# Patient Record
Sex: FEMALE | Race: WHITE | NOT HISPANIC OR LATINO | URBAN - METROPOLITAN AREA
[De-identification: names, ages, dates, MRNs, and addresses within clinical notes are randomized per-mention and may not be internally consistent; named-entity substitution may affect disease eponyms.]

---

## 2017-05-27 ENCOUNTER — INPATIENT (INPATIENT)
Facility: HOSPITAL | Age: 77
LOS: 5 days | Discharge: HOME | End: 2017-06-02

## 2017-05-27 DIAGNOSIS — K55.1 CHRONIC VASCULAR DISORDERS OF INTESTINE: ICD-10-CM

## 2017-05-27 DIAGNOSIS — R55 SYNCOPE AND COLLAPSE: ICD-10-CM

## 2017-06-06 ENCOUNTER — EMERGENCY (EMERGENCY)
Facility: HOSPITAL | Age: 77
LOS: 0 days | Discharge: HOME | End: 2017-06-06
Admitting: INTERNAL MEDICINE

## 2017-06-06 DIAGNOSIS — R55 SYNCOPE AND COLLAPSE: ICD-10-CM

## 2017-06-06 DIAGNOSIS — K55.1 CHRONIC VASCULAR DISORDERS OF INTESTINE: ICD-10-CM
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
## 2017-06-28 DIAGNOSIS — J44.9 CHRONIC OBSTRUCTIVE PULMONARY DISEASE, UNSPECIFIED: ICD-10-CM

## 2017-06-28 DIAGNOSIS — S09.22XA TRAUMATIC RUPTURE OF LEFT EAR DRUM, INITIAL ENCOUNTER: ICD-10-CM

## 2017-06-28 DIAGNOSIS — H92.02 OTALGIA, LEFT EAR: ICD-10-CM

## 2017-06-28 DIAGNOSIS — Z99.81 DEPENDENCE ON SUPPLEMENTAL OXYGEN: ICD-10-CM

## 2017-06-28 DIAGNOSIS — Z90.49 ACQUIRED ABSENCE OF OTHER SPECIFIED PARTS OF DIGESTIVE TRACT: ICD-10-CM

## 2017-06-28 DIAGNOSIS — Z95.1 PRESENCE OF AORTOCORONARY BYPASS GRAFT: ICD-10-CM

## 2017-06-28 DIAGNOSIS — Z90.710 ACQUIRED ABSENCE OF BOTH CERVIX AND UTERUS: ICD-10-CM

## 2017-06-28 DIAGNOSIS — Z79.82 LONG TERM (CURRENT) USE OF ASPIRIN: ICD-10-CM
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
## 2017-06-28 DIAGNOSIS — Z79.899 OTHER LONG TERM (CURRENT) DRUG THERAPY: ICD-10-CM
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
## 2017-07-11 DIAGNOSIS — K21.9 GASTRO-ESOPHAGEAL REFLUX DISEASE WITHOUT ESOPHAGITIS: ICD-10-CM

## 2017-07-11 DIAGNOSIS — E87.5 HYPERKALEMIA: ICD-10-CM

## 2017-07-11 DIAGNOSIS — E78.5 HYPERLIPIDEMIA, UNSPECIFIED: ICD-10-CM

## 2017-07-11 DIAGNOSIS — I25.2 OLD MYOCARDIAL INFARCTION: ICD-10-CM

## 2017-07-11 DIAGNOSIS — Z95.1 PRESENCE OF AORTOCORONARY BYPASS GRAFT: ICD-10-CM

## 2017-07-11 DIAGNOSIS — Z95.5 PRESENCE OF CORONARY ANGIOPLASTY IMPLANT AND GRAFT: ICD-10-CM

## 2017-07-11 DIAGNOSIS — K59.00 CONSTIPATION, UNSPECIFIED: ICD-10-CM

## 2017-07-11 DIAGNOSIS — I34.0 NONRHEUMATIC MITRAL (VALVE) INSUFFICIENCY: ICD-10-CM

## 2017-07-11 DIAGNOSIS — I36.1 NONRHEUMATIC TRICUSPID (VALVE) INSUFFICIENCY: ICD-10-CM

## 2017-07-11 DIAGNOSIS — R06.02 SHORTNESS OF BREATH: ICD-10-CM

## 2017-07-11 DIAGNOSIS — R10.9 UNSPECIFIED ABDOMINAL PAIN: ICD-10-CM

## 2017-07-11 DIAGNOSIS — J96.21 ACUTE AND CHRONIC RESPIRATORY FAILURE WITH HYPOXIA: ICD-10-CM

## 2017-07-11 DIAGNOSIS — R07.9 CHEST PAIN, UNSPECIFIED: ICD-10-CM

## 2017-07-11 DIAGNOSIS — I25.10 ATHEROSCLEROTIC HEART DISEASE OF NATIVE CORONARY ARTERY WITHOUT ANGINA PECTORIS: ICD-10-CM

## 2017-07-11 DIAGNOSIS — E05.90 THYROTOXICOSIS, UNSPECIFIED WITHOUT THYROTOXIC CRISIS OR STORM: ICD-10-CM

## 2017-07-11 DIAGNOSIS — J18.9 PNEUMONIA, UNSPECIFIED ORGANISM: ICD-10-CM

## 2017-07-11 DIAGNOSIS — J43.9 EMPHYSEMA, UNSPECIFIED: ICD-10-CM

## 2017-07-11 DIAGNOSIS — K58.9 IRRITABLE BOWEL SYNDROME WITHOUT DIARRHEA: ICD-10-CM

## 2017-07-11 DIAGNOSIS — T82.398A OTHER MECHANICAL COMPLICATION OF OTHER VASCULAR GRAFTS, INITIAL ENCOUNTER: ICD-10-CM

## 2017-07-11 DIAGNOSIS — I25.5 ISCHEMIC CARDIOMYOPATHY: ICD-10-CM

## 2017-07-11 DIAGNOSIS — I47.1 SUPRAVENTRICULAR TACHYCARDIA: ICD-10-CM

## 2017-07-11 DIAGNOSIS — F41.1 GENERALIZED ANXIETY DISORDER: ICD-10-CM

## 2017-07-11 DIAGNOSIS — I27.2 OTHER SECONDARY PULMONARY HYPERTENSION: ICD-10-CM

## 2017-07-11 DIAGNOSIS — R64 CACHEXIA: ICD-10-CM

## 2017-07-11 DIAGNOSIS — Z82.49 FAMILY HISTORY OF ISCHEMIC HEART DISEASE AND OTHER DISEASES OF THE CIRCULATORY SYSTEM: ICD-10-CM

## 2017-07-11 DIAGNOSIS — I73.9 PERIPHERAL VASCULAR DISEASE, UNSPECIFIED: ICD-10-CM
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
## 2017-09-22 ENCOUNTER — OUTPATIENT (OUTPATIENT)
Dept: OUTPATIENT SERVICES | Facility: HOSPITAL | Age: 77
LOS: 1 days | Discharge: HOME | End: 2017-09-22

## 2017-09-22 DIAGNOSIS — K55.1 CHRONIC VASCULAR DISORDERS OF INTESTINE: ICD-10-CM

## 2017-09-22 DIAGNOSIS — R55 SYNCOPE AND COLLAPSE: ICD-10-CM

## 2017-09-22 DIAGNOSIS — Z87.891 PERSONAL HISTORY OF NICOTINE DEPENDENCE: ICD-10-CM

## 2017-10-27 ENCOUNTER — OUTPATIENT (OUTPATIENT)
Dept: OUTPATIENT SERVICES | Facility: HOSPITAL | Age: 77
LOS: 1 days | Discharge: HOME | End: 2017-10-27

## 2017-10-27 DIAGNOSIS — R55 SYNCOPE AND COLLAPSE: ICD-10-CM

## 2017-10-27 DIAGNOSIS — K55.1 CHRONIC VASCULAR DISORDERS OF INTESTINE: ICD-10-CM

## 2017-10-27 DIAGNOSIS — Z87.891 PERSONAL HISTORY OF NICOTINE DEPENDENCE: ICD-10-CM

## 2018-01-01 ENCOUNTER — OUTPATIENT (OUTPATIENT)
Dept: OUTPATIENT SERVICES | Facility: HOSPITAL | Age: 78
LOS: 1 days | Discharge: HOME | End: 2018-01-01

## 2018-01-01 DIAGNOSIS — H26.40 UNSPECIFIED SECONDARY CATARACT: Chronic | ICD-10-CM

## 2018-01-01 DIAGNOSIS — Z95.1 PRESENCE OF AORTOCORONARY BYPASS GRAFT: Chronic | ICD-10-CM

## 2018-01-01 DIAGNOSIS — Z82.49 FAMILY HISTORY OF ISCHEMIC HEART DISEASE AND OTHER DISEASES OF THE CIRCULATORY SYSTEM: ICD-10-CM

## 2018-01-01 DIAGNOSIS — Z98.890 OTHER SPECIFIED POSTPROCEDURAL STATES: Chronic | ICD-10-CM

## 2018-01-01 DIAGNOSIS — Z98.62 PERIPHERAL VASCULAR ANGIOPLASTY STATUS: Chronic | ICD-10-CM

## 2018-01-01 DIAGNOSIS — J44.9 CHRONIC OBSTRUCTIVE PULMONARY DISEASE, UNSPECIFIED: ICD-10-CM

## 2018-04-21 ENCOUNTER — INPATIENT (INPATIENT)
Facility: HOSPITAL | Age: 78
LOS: 4 days | Discharge: HOME HEALTH | End: 2018-04-26
Attending: INTERNAL MEDICINE | Admitting: INTERNAL MEDICINE

## 2018-04-21 VITALS
SYSTOLIC BLOOD PRESSURE: 207 MMHG | OXYGEN SATURATION: 99 % | RESPIRATION RATE: 22 BRPM | DIASTOLIC BLOOD PRESSURE: 60 MMHG | HEART RATE: 63 BPM | TEMPERATURE: 97 F

## 2018-04-21 DIAGNOSIS — H26.40 UNSPECIFIED SECONDARY CATARACT: Chronic | ICD-10-CM

## 2018-04-21 DIAGNOSIS — Z95.1 PRESENCE OF AORTOCORONARY BYPASS GRAFT: Chronic | ICD-10-CM

## 2018-04-21 DIAGNOSIS — Z98.890 OTHER SPECIFIED POSTPROCEDURAL STATES: Chronic | ICD-10-CM

## 2018-04-21 DIAGNOSIS — Z98.62 PERIPHERAL VASCULAR ANGIOPLASTY STATUS: Chronic | ICD-10-CM

## 2018-04-21 LAB
ALBUMIN SERPL ELPH-MCNC: 4.2 G/DL — SIGNIFICANT CHANGE UP (ref 3.5–5.2)
ALP SERPL-CCNC: 72 U/L — SIGNIFICANT CHANGE UP (ref 30–115)
ALT FLD-CCNC: 12 U/L — SIGNIFICANT CHANGE UP (ref 0–41)
ANION GAP SERPL CALC-SCNC: 13 MMOL/L — SIGNIFICANT CHANGE UP (ref 7–14)
APTT BLD: 32.4 SEC — SIGNIFICANT CHANGE UP (ref 27–39.2)
AST SERPL-CCNC: 19 U/L — SIGNIFICANT CHANGE UP (ref 0–41)
BASE EXCESS BLDV CALC-SCNC: 5.9 MMOL/L — HIGH (ref -2–2)
BASOPHILS # BLD AUTO: 0.04 K/UL — SIGNIFICANT CHANGE UP (ref 0–0.2)
BASOPHILS NFR BLD AUTO: 0.4 % — SIGNIFICANT CHANGE UP (ref 0–1)
BILIRUB SERPL-MCNC: 0.4 MG/DL — SIGNIFICANT CHANGE UP (ref 0.2–1.2)
BUN SERPL-MCNC: 9 MG/DL — LOW (ref 10–20)
CA-I SERPL-SCNC: 1.18 MMOL/L — SIGNIFICANT CHANGE UP (ref 1.12–1.3)
CALCIUM SERPL-MCNC: 9.2 MG/DL — SIGNIFICANT CHANGE UP (ref 8.5–10.1)
CHLORIDE SERPL-SCNC: 95 MMOL/L — LOW (ref 98–110)
CK SERPL-CCNC: 55 U/L — SIGNIFICANT CHANGE UP (ref 0–225)
CO2 SERPL-SCNC: 27 MMOL/L — SIGNIFICANT CHANGE UP (ref 17–32)
CREAT SERPL-MCNC: 0.5 MG/DL — LOW (ref 0.7–1.5)
EOSINOPHIL # BLD AUTO: 0.34 K/UL — SIGNIFICANT CHANGE UP (ref 0–0.7)
EOSINOPHIL NFR BLD AUTO: 3.4 % — SIGNIFICANT CHANGE UP (ref 0–8)
GAS PNL BLDV: 139 MMOL/L — SIGNIFICANT CHANGE UP (ref 136–145)
GAS PNL BLDV: SIGNIFICANT CHANGE UP
GLUCOSE SERPL-MCNC: 91 MG/DL — SIGNIFICANT CHANGE UP (ref 70–99)
HCO3 BLDV-SCNC: 32 MMOL/L — HIGH (ref 22–29)
HCT VFR BLD CALC: 38.5 % — SIGNIFICANT CHANGE UP (ref 37–47)
HCT VFR BLDA CALC: 38.8 % — SIGNIFICANT CHANGE UP (ref 34–44)
HGB BLD CALC-MCNC: 12.7 G/DL — LOW (ref 14–18)
HGB BLD-MCNC: 12.7 G/DL — SIGNIFICANT CHANGE UP (ref 12–16)
IMM GRANULOCYTES NFR BLD AUTO: 0.3 % — SIGNIFICANT CHANGE UP (ref 0.1–0.3)
INR BLD: 0.99 RATIO — SIGNIFICANT CHANGE UP (ref 0.65–1.3)
LACTATE BLDV-MCNC: 0.9 MMOL/L — SIGNIFICANT CHANGE UP (ref 0.5–1.6)
LYMPHOCYTES # BLD AUTO: 3.15 K/UL — SIGNIFICANT CHANGE UP (ref 1.2–3.4)
LYMPHOCYTES # BLD AUTO: 31.8 % — SIGNIFICANT CHANGE UP (ref 20.5–51.1)
MAGNESIUM SERPL-MCNC: 2.2 MG/DL — SIGNIFICANT CHANGE UP (ref 1.8–2.4)
MCHC RBC-ENTMCNC: 32 PG — HIGH (ref 27–31)
MCHC RBC-ENTMCNC: 33 G/DL — SIGNIFICANT CHANGE UP (ref 32–37)
MCV RBC AUTO: 97 FL — SIGNIFICANT CHANGE UP (ref 81–99)
MONOCYTES # BLD AUTO: 0.9 K/UL — HIGH (ref 0.1–0.6)
MONOCYTES NFR BLD AUTO: 9.1 % — SIGNIFICANT CHANGE UP (ref 1.7–9.3)
NEUTROPHILS # BLD AUTO: 5.44 K/UL — SIGNIFICANT CHANGE UP (ref 1.4–6.5)
NEUTROPHILS NFR BLD AUTO: 55 % — SIGNIFICANT CHANGE UP (ref 42.2–75.2)
NRBC # BLD: 0 /100 WBCS — SIGNIFICANT CHANGE UP (ref 0–0)
NT-PROBNP SERPL-SCNC: 383 PG/ML — HIGH (ref 0–300)
PCO2 BLDV: 54 MMHG — HIGH (ref 41–51)
PH BLDV: 7.38 — SIGNIFICANT CHANGE UP (ref 7.26–7.43)
PLATELET # BLD AUTO: 296 K/UL — SIGNIFICANT CHANGE UP (ref 130–400)
PO2 BLDV: 27 MMHG — SIGNIFICANT CHANGE UP (ref 20–40)
POTASSIUM BLDV-SCNC: 4.3 MMOL/L — SIGNIFICANT CHANGE UP (ref 3.3–5.6)
POTASSIUM SERPL-MCNC: 4.4 MMOL/L — SIGNIFICANT CHANGE UP (ref 3.5–5)
POTASSIUM SERPL-SCNC: 4.4 MMOL/L — SIGNIFICANT CHANGE UP (ref 3.5–5)
PROT SERPL-MCNC: 6.8 G/DL — SIGNIFICANT CHANGE UP (ref 6–8)
PROTHROM AB SERPL-ACNC: 10.7 SEC — SIGNIFICANT CHANGE UP (ref 9.95–12.87)
RBC # BLD: 3.97 M/UL — LOW (ref 4.2–5.4)
RBC # FLD: 11.8 % — SIGNIFICANT CHANGE UP (ref 11.5–14.5)
SAO2 % BLDV: 46 % — SIGNIFICANT CHANGE UP
SODIUM SERPL-SCNC: 135 MMOL/L — SIGNIFICANT CHANGE UP (ref 135–146)
TROPONIN T SERPL-MCNC: <0.01 NG/ML — SIGNIFICANT CHANGE UP
WBC # BLD: 9.9 K/UL — SIGNIFICANT CHANGE UP (ref 4.8–10.8)
WBC # FLD AUTO: 9.9 K/UL — SIGNIFICANT CHANGE UP (ref 4.8–10.8)

## 2018-04-21 RX ORDER — IPRATROPIUM/ALBUTEROL SULFATE 18-103MCG
3 AEROSOL WITH ADAPTER (GRAM) INHALATION ONCE
Qty: 0 | Refills: 0 | Status: COMPLETED | OUTPATIENT
Start: 2018-04-21 | End: 2018-04-21

## 2018-04-21 RX ORDER — IPRATROPIUM/ALBUTEROL SULFATE 18-103MCG
3 AEROSOL WITH ADAPTER (GRAM) INHALATION EVERY 6 HOURS
Qty: 0 | Refills: 0 | Status: DISCONTINUED | OUTPATIENT
Start: 2018-04-21 | End: 2018-04-24

## 2018-04-21 RX ORDER — ALPRAZOLAM 0.25 MG
1 TABLET ORAL
Qty: 0 | Refills: 0 | COMMUNITY

## 2018-04-21 RX ORDER — BUDESONIDE AND FORMOTEROL FUMARATE DIHYDRATE 160; 4.5 UG/1; UG/1
2 AEROSOL RESPIRATORY (INHALATION)
Qty: 0 | Refills: 0 | Status: DISCONTINUED | OUTPATIENT
Start: 2018-04-21 | End: 2018-04-26

## 2018-04-21 RX ORDER — ALPRAZOLAM 0.25 MG
0.5 TABLET ORAL
Qty: 0 | Refills: 0 | Status: DISCONTINUED | OUTPATIENT
Start: 2018-04-21 | End: 2018-04-24

## 2018-04-21 RX ORDER — ALBUTEROL 90 UG/1
2 AEROSOL, METERED ORAL
Qty: 0 | Refills: 0 | COMMUNITY

## 2018-04-21 RX ORDER — LEVOTHYROXINE SODIUM 125 MCG
75 TABLET ORAL DAILY
Qty: 0 | Refills: 0 | Status: DISCONTINUED | OUTPATIENT
Start: 2018-04-21 | End: 2018-04-26

## 2018-04-21 RX ORDER — DILTIAZEM HCL 120 MG
180 CAPSULE, EXT RELEASE 24 HR ORAL DAILY
Qty: 0 | Refills: 0 | Status: DISCONTINUED | OUTPATIENT
Start: 2018-04-21 | End: 2018-04-26

## 2018-04-21 RX ORDER — ALBUTEROL 90 UG/1
2 AEROSOL, METERED ORAL EVERY 6 HOURS
Qty: 0 | Refills: 0 | Status: DISCONTINUED | OUTPATIENT
Start: 2018-04-21 | End: 2018-04-26

## 2018-04-21 RX ORDER — ALPRAZOLAM 0.25 MG
0.5 TABLET ORAL ONCE
Qty: 0 | Refills: 0 | Status: DISCONTINUED | OUTPATIENT
Start: 2018-04-21 | End: 2018-04-21

## 2018-04-21 RX ADMIN — Medication 3 MILLILITER(S): at 14:53

## 2018-04-21 RX ADMIN — Medication 3 MILLILITER(S): at 14:28

## 2018-04-21 RX ADMIN — Medication 3 MILLILITER(S): at 14:13

## 2018-04-21 RX ADMIN — Medication 125 MILLIGRAM(S): at 14:16

## 2018-04-21 NOTE — H&P ADULT - NSHPLABSRESULTS_GEN_ALL_CORE
CBC Full  -  ( 21 Apr 2018 13:50 )  WBC Count : 9.90 K/uL  Hemoglobin : 12.7 g/dL  Hematocrit : 38.5 %  Platelet Count - Automated : 296 K/uL  Mean Cell Volume : 97.0 fL  Mean Cell Hemoglobin : 32.0 pg  Mean Cell Hemoglobin Concentration : 33.0 g/dL  Auto Neutrophil % : 55.0 %  Auto Lymphocyte % : 31.8 %  Auto Monocyte % : 9.1 %  Auto Eosinophil % : 3.4 %  Auto Basophil % : 0.4 %    BMP: 04-21-18 @ 13:50  135 | 95 | 9    -----------------< 91  4.4  | 27 | 0.5  eGFR(AA): 107, eGFR (non-AA): 93  Ca 9.2, Mg 2.2, P --    LFTs: 04-21-18 @ 13:50  TP  6.8  | 4.2 Alb   ---------------  TB  0.4  | --  DB   ---------------  ALT 12  | 19  AST            ^          72  ALK    PT/INR/PTT: 04-21-18 @ 13:50  10.70 | 32.4          ^        0.99    Cardiac Enzymes: 04-21-18 @ 13:50  Trop T: <0.01  CKMB: 2.0   CK: 55    Serum Pro-Brain Natriuretic Peptide (04.21.18 @ 13:50)    Serum Pro-Brain Natriuretic Peptide: 383 pg/mL    CT Chest w/ IV Cont (04.21.18 @ 17:17):  No central or lobar pulmonary embolism.    X-ray Chest 1 View-PORTABLE IMMEDIATE (04.21.18 @ 14:56):  No focal consolidations. Persistent blunting of bilateral costophrenic angles. Hyperinflated lungs. CBC Full  -  ( 21 Apr 2018 13:50 )  WBC Count : 9.90 K/uL  Hemoglobin : 12.7 g/dL  Hematocrit : 38.5 %  Platelet Count - Automated : 296 K/uL  Mean Cell Volume : 97.0 fL  Mean Cell Hemoglobin : 32.0 pg  Mean Cell Hemoglobin Concentration : 33.0 g/dL  Auto Neutrophil % : 55.0 %  Auto Lymphocyte % : 31.8 %  Auto Monocyte % : 9.1 %  Auto Eosinophil % : 3.4 %  Auto Basophil % : 0.4 %    BMP: 04-21-18 @ 13:50  135 | 95 | 9    -----------------< 91  4.4  | 27 | 0.5  eGFR(AA): 107, eGFR (non-AA): 93  Ca 9.2, Mg 2.2, P --    LFTs: 04-21-18 @ 13:50  TP  6.8  | 4.2 Alb   ---------------  TB  0.4  | --  DB   ---------------  ALT 12  | 19  AST            ^          72  ALK    PT/INR/PTT: 04-21-18 @ 13:50  10.70 | 32.4          ^        0.99    Cardiac Enzymes: 04-21-18 @ 13:50  Trop T: <0.01  CKMB: 2.0   CK: 55    Serum Pro-Brain Natriuretic Peptide (04.21.18 @ 13:50)    Serum Pro-Brain Natriuretic Peptide: 383 pg/mL    CT Chest w/ IV Cont (04.21.18 @ 17:17):  No central or lobar pulmonary embolism.    X-ray Chest 1 View-PORTABLE IMMEDIATE (04.21.18 @ 14:56):  No focal consolidations. Persistent blunting of bilateral costophrenic angles. Hyperinflated lungs.    ECG: Normal sinus rhythm with sinus arrhythmia

## 2018-04-21 NOTE — ED ADULT NURSE NOTE - PMH
Graves disease    Hyperthyroidism    MI (myocardial infarction)    Shingles Diverticulitis    Graves disease    Hyperthyroidism    MI (myocardial infarction)    Shingles

## 2018-04-21 NOTE — H&P ADULT - HISTORY OF PRESENT ILLNESS
COPD on 1.5L O2, CAD, MI (1991), CABG, hypothyroidism 2/2 radioablation due to graves, AAA, HTN, HLD, PVD, GERD, Anxiety    DM II ?    CABG  fem-pop bypass  bladder surgery  AAA repair  mesenteric artery stent 78 y/o F with PMH of COPD on 1.5L O2 PRN (usually uses at night), CAD, MI (1991) s/p CABG, hypothyroidism 2/2 radioablation for hyperthyroidism, AAA s/p repair, HTN, HLD, PVD s/p fem-pop bypass, GERD, and anxiety presented for shortness of breath.  The patient states that for the past month, she has become progressively more SOB. Over the past several days it has worsened to the point of SOB with minimal exertion. She has had to use oxygen support continuously, whereas she used to only use it at night. Additionally, she is currently using her PRN nebulizers three times per day with minimal relief.  Due to symptoms not improving, she presented to an urgent care, who advised her to go to the ED for evaluation.  She denies any recent illnesses, fever, chills, headache, chest pain, abdominal pain, diarrhea, dysuria. She denies cough or sputum production.

## 2018-04-21 NOTE — ED PROVIDER NOTE - NS ED ROS FT
ROS: No fever/chills, No headache/photophobia/eye pain/changes in vision, No ear pain/sore throat/dysphagia,, no cough/wheeze/stridor, No abdominal pain, No N/V/D/melena, no dysuria/frequency/discharge, No neck/back pain, no rash, no changes in neurological status/function.    +SOB

## 2018-04-21 NOTE — H&P ADULT - ATTENDING COMMENTS
I was assigned to see pt on 4/24/18. I am signing this H&P for completion purposes.  Please see my note on 4/24/18.

## 2018-04-21 NOTE — ED ADULT TRIAGE NOTE - CHIEF COMPLAINT QUOTE
came in from urgent care for difficulty breathing came in from urgent care for difficulty breathing, hx cardiac bypass

## 2018-04-21 NOTE — H&P ADULT - ASSESSMENT
76 y/o F with PMH of COPD on 1.5L O2 PRN (usually uses at night), CAD, MI (1991) s/p CABG, hypothyroidism 2/2 radioablation for hyperthyroidism, AAA s/p repair, HTN, HLD, PVD s/p fem-pop bypass, GERD, and anxiety presented for shortness of breath.     1.) Acute exacerbation of chronic obstructive pulmonary disease:    - s/p 125mg solumedrol in the ED with improvement.    - Continue solumedrol Q12h.    - Continue nebulizers and symbicort.    - Wean oxygen support as tolerated.    - Consider pulmonary consult if patient does not improve with current therapy    - No symptoms or signs of infection. Will hold antibiotics at this time.    2.) Anxiety:    - Continue xanax.    3.) Hypothyroidism:    - Continue levothyroxine    4.) Hypertension:    - Continue dilitazem    5.) GI / DVT PPx:    6.) Disposition:    - Full Code.

## 2018-04-21 NOTE — H&P ADULT - PMH
Abdominal aortic aneurysm (AAA) without rupture    Anxiety    Chronic obstructive pulmonary disease with acute exacerbation    Coronary artery disease involving native coronary artery of native heart without angina pectoris    Essential hypertension    Gastroesophageal reflux disease without esophagitis    Graves disease    History of myocardial infarction    MI (myocardial infarction)    Peripheral vascular disease    Postablative hypothyroidism

## 2018-04-21 NOTE — ED PROVIDER NOTE - PHYSICAL EXAMINATION
VITAL SIGNS: I have reviewed nursing notes and confirm.  CONSTITUTIONAL: Well-developed; well-nourished; in no acute distress. pt on oxygen-NC  SKIN: skin exam is warm and dry, no acute rash.   HEAD: Normocephalic; atraumatic.  EYES:  EOM intact; conjunctiva and sclera clear.  ENT: No nasal discharge; airway clear. .    NECK: Supple; non tender.  CARD: S1, S2 normal; no murmurs, gallops, or rubs. Regular rate and rhythm. posterior tibial and radial pulses 2+  RESP: mild crackles b/l cta b/l. no use of accessory muscles. no retractions  ABD: Normal bowel sounds; soft; non-distended; mild tenderness to palpation to abdomen-- pt states it is baseline for her  EXT: Normal ROM. No  cyanosis or edema.  BACK: No cva tenderness  LYMPH: No acute cervical adenopathy.  NEURO: Alert, oriented, grossly unremarkable.    PSYCH: Cooperative, appropriate.

## 2018-04-21 NOTE — H&P ADULT - NSHPPHYSICALEXAM_GEN_ALL_CORE
T(F): 98 (04-21-18 @ 18:50), Max: 98 (04-21-18 @ 16:57)  HR: 77 (04-21-18 @ 18:50) (63 - 84)  BP: 152/75 (04-21-18 @ 18:50) (152/75 - 207/60)  RR: 20 (04-21-18 @ 18:50) (20 - 22)  SpO2: 98% (04-21-18 @ 18:50) (97% - 99%)    Physical Exam:  General: Not in distress.   HEENT: Moist mucus membranes. PERRLA.  Cardio: Regular rate and rhythm, S1, S2, no murmur, rub, or gallop.  Pulm: Clear to auscultation bilaterally. No wheezing, rales, or rhonchi.  Abdomen: Soft, non-tender, non-distended. Normoactive bowel sounds.  Extremities: No cyanosis or edema bilaterally. No calf tenderness to palpation.  Neuro: A&O x3. No focal deficits. CN II - XII grossly intact. T(F): 98 (04-21-18 @ 18:50), Max: 98 (04-21-18 @ 16:57)  HR: 77 (04-21-18 @ 18:50) (63 - 84)  BP: 152/75 (04-21-18 @ 18:50) (152/75 - 207/60)  RR: 20 (04-21-18 @ 18:50) (20 - 22)  SpO2: 98% (04-21-18 @ 18:50) (97% - 99%)    Physical Exam:  General: Not in distress.   HEENT: Moist mucus membranes. PERRLA.  Cardio: Regular rate and rhythm, S1, S2, (+) systolic murmur. No rub, or gallop.  Pulm: Decreased breath sounds bilaterally. No wheezing, rales, or rhonchi.  Abdomen: Soft, non-tender, non-distended. Normoactive bowel sounds.  Extremities: No cyanosis or edema bilaterally. No calf tenderness to palpation.  Neuro: A&O x3. No focal deficits. CN II - XII grossly intact.

## 2018-04-21 NOTE — ED PROVIDER NOTE - PROGRESS NOTE DETAILS
I personally evaluated the patient. I reviewed the Resident’s note (as assigned above), and agree with the findings and plan except as documented in my note.     78 y/o F PMHx COPD on 1.5 L home O2 at night only comes in with progressive SOB. Pt notes over last week she has had worsening SOB and has been using her oxygen during the day, as well as nebs with minimal relief.  Pt also with extensive medical HX including MI, stent, CABG x3, as well as aortic stents and mesenteric artery stents.  Pt notes dry cough and subjective fevers. Denies CP, leg pain, or leg swelling.  Pt notes she was recently with her sister who had pneumonia. [facility] and was sent in for further evaluation.  PE: VS noted. Slightly tachypneic and dyspneic, speaking in full sentences. Lungs decreased breath sounds to R side, S1S2. Abdomen soft NTND. Extremities no CCE.   A/P: EKG, O2, monitor, labs, will try to upload XR from [facility], nebs, Solumedrol, reassess, and admit.

## 2018-04-21 NOTE — ED PROVIDER NOTE - OBJECTIVE STATEMENT
76y/o F w/ hx of COPD (home O2 1.5L at night, never been intubated, pulmonologist Dr. Reeves), HTN, CAD (2MIs 1991, 1992; triple by pass), femora bypass to both legs, 2 stents in the aorta, angioplasty to r. femoral, 1 stent in mesenteric artery- chronic vascular insuffiencey of intestines, IBD, diverticulitis, hypothyroidism 2/2 to [facility] tx.   is sent by urgent for increase shortness of breath in the past 2 weeks, worsens when lying down, denies swelling of legs.  Pt feels tightness to chest as well.  Pt walks around--no trauma to extremities, no hx of cancer, no travel hx.  Pt denies cough, congestion, fevers, runny nose.  pt has tried nebulizer at home with no improvement

## 2018-04-21 NOTE — H&P ADULT - PSH
After cataract, bilateral  R - 10/13  L - 8/14  H/O angioplasty  R femoral  H/O: hysterectomy  partial  History of cholecystectomy    History of femoropopliteal bypass    S/P CABG (coronary artery bypass graft)

## 2018-04-22 LAB
ANION GAP SERPL CALC-SCNC: 10 MMOL/L — SIGNIFICANT CHANGE UP (ref 7–14)
BUN SERPL-MCNC: 11 MG/DL — SIGNIFICANT CHANGE UP (ref 10–20)
CALCIUM SERPL-MCNC: 9.4 MG/DL — SIGNIFICANT CHANGE UP (ref 8.5–10.1)
CHLORIDE SERPL-SCNC: 96 MMOL/L — LOW (ref 98–110)
CO2 SERPL-SCNC: 31 MMOL/L — SIGNIFICANT CHANGE UP (ref 17–32)
CREAT SERPL-MCNC: 0.6 MG/DL — LOW (ref 0.7–1.5)
GLUCOSE SERPL-MCNC: 135 MG/DL — HIGH (ref 70–99)
HCT VFR BLD CALC: 37.3 % — SIGNIFICANT CHANGE UP (ref 37–47)
HGB BLD-MCNC: 12.2 G/DL — SIGNIFICANT CHANGE UP (ref 12–16)
MAGNESIUM SERPL-MCNC: 2.1 MG/DL — SIGNIFICANT CHANGE UP (ref 1.8–2.4)
MCHC RBC-ENTMCNC: 31.7 PG — HIGH (ref 27–31)
MCHC RBC-ENTMCNC: 32.7 G/DL — SIGNIFICANT CHANGE UP (ref 32–37)
MCV RBC AUTO: 96.9 FL — SIGNIFICANT CHANGE UP (ref 81–99)
NRBC # BLD: 0 /100 WBCS — SIGNIFICANT CHANGE UP (ref 0–0)
PLATELET # BLD AUTO: 314 K/UL — SIGNIFICANT CHANGE UP (ref 130–400)
POTASSIUM SERPL-MCNC: 4.6 MMOL/L — SIGNIFICANT CHANGE UP (ref 3.5–5)
POTASSIUM SERPL-SCNC: 4.6 MMOL/L — SIGNIFICANT CHANGE UP (ref 3.5–5)
RBC # BLD: 3.85 M/UL — LOW (ref 4.2–5.4)
RBC # FLD: 11.6 % — SIGNIFICANT CHANGE UP (ref 11.5–14.5)
SODIUM SERPL-SCNC: 137 MMOL/L — SIGNIFICANT CHANGE UP (ref 135–146)
WBC # BLD: 5.56 K/UL — SIGNIFICANT CHANGE UP (ref 4.8–10.8)
WBC # FLD AUTO: 5.56 K/UL — SIGNIFICANT CHANGE UP (ref 4.8–10.8)

## 2018-04-22 RX ORDER — ENOXAPARIN SODIUM 100 MG/ML
40 INJECTION SUBCUTANEOUS DAILY
Qty: 0 | Refills: 0 | Status: DISCONTINUED | OUTPATIENT
Start: 2018-04-22 | End: 2018-04-26

## 2018-04-22 RX ORDER — ACETAMINOPHEN 500 MG
650 TABLET ORAL ONCE
Qty: 0 | Refills: 0 | Status: COMPLETED | OUTPATIENT
Start: 2018-04-22 | End: 2018-04-22

## 2018-04-22 RX ADMIN — Medication 650 MILLIGRAM(S): at 02:36

## 2018-04-22 RX ADMIN — Medication 3 MILLILITER(S): at 20:25

## 2018-04-22 RX ADMIN — BUDESONIDE AND FORMOTEROL FUMARATE DIHYDRATE 2 PUFF(S): 160; 4.5 AEROSOL RESPIRATORY (INHALATION) at 11:07

## 2018-04-22 RX ADMIN — Medication 0.5 MILLIGRAM(S): at 00:18

## 2018-04-22 RX ADMIN — Medication 0.5 MILLIGRAM(S): at 07:07

## 2018-04-22 RX ADMIN — ALBUTEROL 2 PUFF(S): 90 AEROSOL, METERED ORAL at 16:14

## 2018-04-22 RX ADMIN — Medication 3 MILLILITER(S): at 00:04

## 2018-04-22 RX ADMIN — Medication 180 MILLIGRAM(S): at 07:07

## 2018-04-22 RX ADMIN — BUDESONIDE AND FORMOTEROL FUMARATE DIHYDRATE 2 PUFF(S): 160; 4.5 AEROSOL RESPIRATORY (INHALATION) at 22:09

## 2018-04-22 RX ADMIN — Medication 60 MILLIGRAM(S): at 07:11

## 2018-04-22 RX ADMIN — Medication 75 MICROGRAM(S): at 07:07

## 2018-04-22 RX ADMIN — ENOXAPARIN SODIUM 40 MILLIGRAM(S): 100 INJECTION SUBCUTANEOUS at 11:08

## 2018-04-22 RX ADMIN — Medication 3 MILLILITER(S): at 08:05

## 2018-04-22 RX ADMIN — Medication 0.5 MILLIGRAM(S): at 22:08

## 2018-04-22 RX ADMIN — Medication 60 MILLIGRAM(S): at 18:11

## 2018-04-23 LAB — LACTATE SERPL-SCNC: 1.5 MMOL/L — SIGNIFICANT CHANGE UP (ref 0.5–2.2)

## 2018-04-23 RX ORDER — TIOTROPIUM BROMIDE 18 UG/1
1 CAPSULE ORAL; RESPIRATORY (INHALATION) DAILY
Qty: 0 | Refills: 0 | Status: DISCONTINUED | OUTPATIENT
Start: 2018-04-23 | End: 2018-04-26

## 2018-04-23 RX ORDER — TIOTROPIUM BROMIDE 18 UG/1
1 CAPSULE ORAL; RESPIRATORY (INHALATION) DAILY
Qty: 0 | Refills: 0 | Status: DISCONTINUED | OUTPATIENT
Start: 2018-04-23 | End: 2018-04-23

## 2018-04-23 RX ORDER — ALPRAZOLAM 0.25 MG
0.5 TABLET ORAL ONCE
Qty: 0 | Refills: 0 | Status: DISCONTINUED | OUTPATIENT
Start: 2018-04-23 | End: 2018-04-23

## 2018-04-23 RX ADMIN — Medication 75 MICROGRAM(S): at 06:43

## 2018-04-23 RX ADMIN — Medication 3 MILLILITER(S): at 19:44

## 2018-04-23 RX ADMIN — Medication 3 MILLILITER(S): at 08:54

## 2018-04-23 RX ADMIN — Medication 0.5 MILLIGRAM(S): at 18:08

## 2018-04-23 RX ADMIN — Medication 0.5 MILLIGRAM(S): at 09:35

## 2018-04-23 RX ADMIN — ALBUTEROL 2 PUFF(S): 90 AEROSOL, METERED ORAL at 14:54

## 2018-04-23 RX ADMIN — Medication 180 MILLIGRAM(S): at 06:43

## 2018-04-23 RX ADMIN — Medication 0.5 MILLIGRAM(S): at 23:23

## 2018-04-23 RX ADMIN — ENOXAPARIN SODIUM 40 MILLIGRAM(S): 100 INJECTION SUBCUTANEOUS at 11:29

## 2018-04-23 RX ADMIN — BUDESONIDE AND FORMOTEROL FUMARATE DIHYDRATE 2 PUFF(S): 160; 4.5 AEROSOL RESPIRATORY (INHALATION) at 22:38

## 2018-04-23 RX ADMIN — Medication 60 MILLIGRAM(S): at 06:43

## 2018-04-23 NOTE — PROGRESS NOTE ADULT - ASSESSMENT
76 y/o F with PMH of COPD on 1.5L O2 PRN (usually uses at night), CAD, MI (1991) s/p CABG, hypothyroidism 2/2 radioablation for hyperthyroidism (no on synthroid), AAA s/p repair, HTN, HLD, PVD s/p fem-pop bypass, GERD, and anxiety presented for shortness of breath.     #Acute exacerbation of chronic obstructive pulmonary disease (emphysema) 2/2 unknown trigger:    - s/p 125mg solumedrol in the ED with improvement.    - will switch solumedrol to PO prednisone starting tomorrow    - Continue nebulizers, symbicort, and add atrovent    - Pt on baseline O2 of 1.5 to 2.    - No symptoms or signs of infection, CXR shows no effusion or focal consolidation. Will hold antibiotics at this time.    - Patient complains of exertional dyspnea for past month and waking up at night dyspneic. Given her h/o MI, CABG, and vascular disease, consulted Cardio (Dr. Hoang).    #Stomach fullness, r/o mesenteric ischemia:  - Obtain Lactate level  - Obtain CTA A/P    #Anxiety/Depression:    - Patient depressed about losing her  three years ago and still adjusting to living without him. Her daughter lives in her basement apartment and takes care of her.    - Continue xanax for now, consulted Psych    #Hypothyroidism:    - Continue levothyroxine    #Hypertension:    - Continue diltiazem    #DVT ppx with Lovenox subQ    #Code Status: Full Code  #Dispo: from home, d/c pending cardiac and GI workup

## 2018-04-24 DIAGNOSIS — I25.10 ATHEROSCLEROTIC HEART DISEASE OF NATIVE CORONARY ARTERY WITHOUT ANGINA PECTORIS: ICD-10-CM

## 2018-04-24 DIAGNOSIS — I73.9 PERIPHERAL VASCULAR DISEASE, UNSPECIFIED: ICD-10-CM

## 2018-04-24 DIAGNOSIS — K58.1 IRRITABLE BOWEL SYNDROME WITH CONSTIPATION: ICD-10-CM

## 2018-04-24 DIAGNOSIS — F41.9 ANXIETY DISORDER, UNSPECIFIED: ICD-10-CM

## 2018-04-24 DIAGNOSIS — R14.1 GAS PAIN: ICD-10-CM

## 2018-04-24 DIAGNOSIS — R11.0 NAUSEA: ICD-10-CM

## 2018-04-24 DIAGNOSIS — E03.9 HYPOTHYROIDISM, UNSPECIFIED: ICD-10-CM

## 2018-04-24 DIAGNOSIS — I10 ESSENTIAL (PRIMARY) HYPERTENSION: ICD-10-CM

## 2018-04-24 DIAGNOSIS — J44.9 CHRONIC OBSTRUCTIVE PULMONARY DISEASE, UNSPECIFIED: ICD-10-CM

## 2018-04-24 LAB
ANION GAP SERPL CALC-SCNC: 10 MMOL/L — SIGNIFICANT CHANGE UP (ref 7–14)
BASOPHILS # BLD AUTO: 0.01 K/UL — SIGNIFICANT CHANGE UP (ref 0–0.2)
BASOPHILS NFR BLD AUTO: 0.1 % — SIGNIFICANT CHANGE UP (ref 0–1)
BUN SERPL-MCNC: 18 MG/DL — SIGNIFICANT CHANGE UP (ref 10–20)
CALCIUM SERPL-MCNC: 9.1 MG/DL — SIGNIFICANT CHANGE UP (ref 8.5–10.1)
CHLORIDE SERPL-SCNC: 98 MMOL/L — SIGNIFICANT CHANGE UP (ref 98–110)
CO2 SERPL-SCNC: 28 MMOL/L — SIGNIFICANT CHANGE UP (ref 17–32)
CREAT SERPL-MCNC: 0.6 MG/DL — LOW (ref 0.7–1.5)
EOSINOPHIL # BLD AUTO: 0.01 K/UL — SIGNIFICANT CHANGE UP (ref 0–0.7)
EOSINOPHIL NFR BLD AUTO: 0.1 % — SIGNIFICANT CHANGE UP (ref 0–8)
GLUCOSE SERPL-MCNC: 107 MG/DL — HIGH (ref 70–99)
HCT VFR BLD CALC: 35 % — LOW (ref 37–47)
HGB BLD-MCNC: 11.4 G/DL — LOW (ref 12–16)
IMM GRANULOCYTES NFR BLD AUTO: 0.5 % — HIGH (ref 0.1–0.3)
LYMPHOCYTES # BLD AUTO: 27.3 % — SIGNIFICANT CHANGE UP (ref 20.5–51.1)
LYMPHOCYTES # BLD AUTO: 3.25 K/UL — SIGNIFICANT CHANGE UP (ref 1.2–3.4)
MCHC RBC-ENTMCNC: 31.8 PG — HIGH (ref 27–31)
MCHC RBC-ENTMCNC: 32.6 G/DL — SIGNIFICANT CHANGE UP (ref 32–37)
MCV RBC AUTO: 97.5 FL — SIGNIFICANT CHANGE UP (ref 81–99)
MONOCYTES # BLD AUTO: 0.9 K/UL — HIGH (ref 0.1–0.6)
MONOCYTES NFR BLD AUTO: 7.6 % — SIGNIFICANT CHANGE UP (ref 1.7–9.3)
NEUTROPHILS # BLD AUTO: 7.69 K/UL — HIGH (ref 1.4–6.5)
NEUTROPHILS NFR BLD AUTO: 64.4 % — SIGNIFICANT CHANGE UP (ref 42.2–75.2)
NRBC # BLD: 0 /100 WBCS — SIGNIFICANT CHANGE UP (ref 0–0)
PLATELET # BLD AUTO: 271 K/UL — SIGNIFICANT CHANGE UP (ref 130–400)
POTASSIUM SERPL-MCNC: 4.6 MMOL/L — SIGNIFICANT CHANGE UP (ref 3.5–5)
POTASSIUM SERPL-SCNC: 4.6 MMOL/L — SIGNIFICANT CHANGE UP (ref 3.5–5)
RBC # BLD: 3.59 M/UL — LOW (ref 4.2–5.4)
RBC # FLD: 11.9 % — SIGNIFICANT CHANGE UP (ref 11.5–14.5)
SODIUM SERPL-SCNC: 136 MMOL/L — SIGNIFICANT CHANGE UP (ref 135–146)
WBC # BLD: 11.92 K/UL — HIGH (ref 4.8–10.8)
WBC # FLD AUTO: 11.92 K/UL — HIGH (ref 4.8–10.8)

## 2018-04-24 RX ORDER — SIMETHICONE 80 MG/1
80 TABLET, CHEWABLE ORAL
Qty: 0 | Refills: 0 | Status: DISCONTINUED | OUTPATIENT
Start: 2018-04-24 | End: 2018-04-26

## 2018-04-24 RX ORDER — ASPIRIN/CALCIUM CARB/MAGNESIUM 324 MG
300 TABLET ORAL DAILY
Qty: 0 | Refills: 0 | Status: DISCONTINUED | OUTPATIENT
Start: 2018-04-24 | End: 2018-04-26

## 2018-04-24 RX ORDER — ATORVASTATIN CALCIUM 80 MG/1
80 TABLET, FILM COATED ORAL AT BEDTIME
Qty: 0 | Refills: 0 | Status: DISCONTINUED | OUTPATIENT
Start: 2018-04-24 | End: 2018-04-24

## 2018-04-24 RX ORDER — SERTRALINE 25 MG/1
25 TABLET, FILM COATED ORAL DAILY
Qty: 0 | Refills: 0 | Status: DISCONTINUED | OUTPATIENT
Start: 2018-04-24 | End: 2018-04-26

## 2018-04-24 RX ORDER — METOCLOPRAMIDE HCL 10 MG
2.5 TABLET ORAL THREE TIMES A DAY
Qty: 0 | Refills: 0 | Status: DISCONTINUED | OUTPATIENT
Start: 2018-04-24 | End: 2018-04-24

## 2018-04-24 RX ORDER — DOCUSATE SODIUM 100 MG
100 CAPSULE ORAL THREE TIMES A DAY
Qty: 0 | Refills: 0 | Status: DISCONTINUED | OUTPATIENT
Start: 2018-04-24 | End: 2018-04-26

## 2018-04-24 RX ORDER — ALPRAZOLAM 0.25 MG
0.25 TABLET ORAL
Qty: 0 | Refills: 0 | Status: DISCONTINUED | OUTPATIENT
Start: 2018-04-24 | End: 2018-04-25

## 2018-04-24 RX ORDER — FAMOTIDINE 10 MG/ML
20 INJECTION INTRAVENOUS
Qty: 0 | Refills: 0 | Status: DISCONTINUED | OUTPATIENT
Start: 2018-04-24 | End: 2018-04-26

## 2018-04-24 RX ORDER — METOCLOPRAMIDE HCL 10 MG
2.5 TABLET ORAL THREE TIMES A DAY
Qty: 0 | Refills: 0 | Status: DISCONTINUED | OUTPATIENT
Start: 2018-04-24 | End: 2018-04-26

## 2018-04-24 RX ADMIN — FAMOTIDINE 20 MILLIGRAM(S): 10 INJECTION INTRAVENOUS at 19:53

## 2018-04-24 RX ADMIN — TIOTROPIUM BROMIDE 1 CAPSULE(S): 18 CAPSULE ORAL; RESPIRATORY (INHALATION) at 09:07

## 2018-04-24 RX ADMIN — BUDESONIDE AND FORMOTEROL FUMARATE DIHYDRATE 2 PUFF(S): 160; 4.5 AEROSOL RESPIRATORY (INHALATION) at 21:22

## 2018-04-24 RX ADMIN — Medication 2.5 MILLIGRAM(S): at 21:52

## 2018-04-24 RX ADMIN — Medication 0.25 MILLIGRAM(S): at 19:54

## 2018-04-24 RX ADMIN — ENOXAPARIN SODIUM 40 MILLIGRAM(S): 100 INJECTION SUBCUTANEOUS at 12:39

## 2018-04-24 RX ADMIN — Medication 180 MILLIGRAM(S): at 06:12

## 2018-04-24 RX ADMIN — Medication 20 MILLIGRAM(S): at 15:50

## 2018-04-24 RX ADMIN — BUDESONIDE AND FORMOTEROL FUMARATE DIHYDRATE 2 PUFF(S): 160; 4.5 AEROSOL RESPIRATORY (INHALATION) at 09:38

## 2018-04-24 RX ADMIN — Medication 60 MILLIGRAM(S): at 06:13

## 2018-04-24 RX ADMIN — SERTRALINE 25 MILLIGRAM(S): 25 TABLET, FILM COATED ORAL at 19:53

## 2018-04-24 RX ADMIN — Medication 3 MILLILITER(S): at 09:11

## 2018-04-24 RX ADMIN — Medication 75 MICROGRAM(S): at 06:12

## 2018-04-24 RX ADMIN — Medication 0.5 MILLIGRAM(S): at 06:14

## 2018-04-24 RX ADMIN — SIMETHICONE 80 MILLIGRAM(S): 80 TABLET, CHEWABLE ORAL at 19:54

## 2018-04-24 RX ADMIN — Medication 100 MILLIGRAM(S): at 21:22

## 2018-04-24 NOTE — BEHAVIORAL HEALTH ASSESSMENT NOTE - RISK ASSESSMENT
patient is at elevated risk due to chronic medical conditions and recent losses, risks are mitigated by her future orientation, strong support system, good distress tolerance, lack of SI, good insight

## 2018-04-24 NOTE — PROGRESS NOTE ADULT - PROBLEM SELECTOR PLAN 2
could discuss Linzess w/ GI as outpt  bentyl and librax did not work  add colace  can cont MOM 15-30cc qhs as needed

## 2018-04-24 NOTE — DISCHARGE NOTE ADULT - CARE PROVIDER_API CALL
Naveen Ibarra), Geriatric Medicine; Internal Medicine  [address] [place] [id number]  Phone: [phone]  Fax: [phone]    Karen Dean), Internal Medicine  [address] [place] [id number]  Phone: [phone]  Fax: [phone]

## 2018-04-24 NOTE — PROGRESS NOTE ADULT - ATTENDING COMMENTS
Anticipate d/c home tomorrow.    Above changes needed to be titrated as outpt and will take some time to fully work.    Pt advised that her sister should get some other asst for herself.  The interaction btw the pt and her sister is causing anxiety and much of the pt's stomach issues.  Perhaps an aide for the sister would help.

## 2018-04-24 NOTE — CONSULT NOTE ADULT - SUBJECTIVE AND OBJECTIVE BOX
Patient is a 77y old  Female who presents with a chief complaint of Shortness of Breath (21 Apr 2018 20:38)      HPI:  76 y/o F with PMH of COPD on 1.5L O2 PRN (usually uses at night), CAD, MI (1991) s/p CABG, hypothyroidism 2/2 radioablation for hyperthyroidism, AAA s/p repair, HTN, HLD, PVD s/p fem-pop bypass, GERD, and anxiety presented for shortness of breath.  The patient states that for the past month, she has become progressively more SOB. Over the past several days it has worsened to the point of SOB with minimal exertion. She has had to use oxygen support continuously, whereas she used to only use it at night. Additionally, she is currently using her PRN nebulizers three times per day with minimal relief.  Due to symptoms not improving, she presented to an urgent care, who advised her to go to the ED for evaluation.  She denies any recent illnesses, fever, chills, headache, chest pain, abdominal pain, diarrhea, dysuria. She denies cough or sputum production. (21 Apr 2018 20:38)      PAST MEDICAL & SURGICAL HISTORY:  History of myocardial infarction  Anxiety  Gastroesophageal reflux disease without esophagitis  Essential hypertension  Abdominal aortic aneurysm (AAA) without rupture  Chronic obstructive pulmonary disease with acute exacerbation  Peripheral vascular disease  Coronary artery disease involving native coronary artery of native heart without angina pectoris  Postablative hypothyroidism  Graves disease  MI (myocardial infarction)  S/P CABG (coronary artery bypass graft)  History of femoropopliteal bypass  After cataract, bilateral: R - 10/13  L - 8/14  H/O angioplasty: R femoral  H/O: hysterectomy: partial  History of cholecystectomy      PREVIOUS DIAGNOSTIC TESTING:      ECHO  FINDINGS:    STRESS  FINDINGS:    CATHETERIZATION  FINDINGS:    MEDICATIONS  (STANDING):  ALBUTerol/ipratropium for Nebulization 3 milliLiter(s) Nebulizer every 6 hours  ALPRAZolam 0.5 milliGRAM(s) Oral two times a day  buDESOnide 160 MICROgram(s)/formoterol 4.5 MICROgram(s) Inhaler 2 Puff(s) Inhalation two times a day  diltiazem    milliGRAM(s) Oral daily  enoxaparin Injectable 40 milliGRAM(s) SubCutaneous daily  levothyroxine 75 MICROGram(s) Oral daily  predniSONE   Tablet 60 milliGRAM(s) Oral daily  tiotropium 18 MICROgram(s) Capsule 1 Capsule(s) Inhalation daily    MEDICATIONS  (PRN):  ALBUTerol    90 MICROgram(s) HFA Inhaler 2 Puff(s) Inhalation every 6 hours PRN Shortness of Breath and/or Wheezing      FAMILY HISTORY:  No pertinent family history in first degree relatives      SOCIAL HISTORY:  CIGARETTES:    ALCOHOL:    REVIEW OF SYSTEMS:  CONSTITUTIONAL: No fever, weight loss, or fatigue  NECK: No pain or stiffness  RESPIRATORY: No cough, wheezing, chills or hemoptysis; No shortness of breath  CARDIOVASCULAR: No chest pain, palpitations, dizziness, or leg swelling  GASTROINTESTINAL: No abdominal or epigastric pain. No nausea, vomiting, or hematemesis; No diarrhea or constipation. No melena or hematochezia.  GENITOURINARY: No dysuria, frequency, hematuria, or incontinence  NEUROLOGICAL: No headaches, memory loss, loss of strength, numbness, or tremors  SKIN: No itching, burning, rashes, or lesions   ENDOCRINE: No heat or cold intolerance; No hair loss  MUSCULOSKELETAL: No joint pain or swelling; No muscle, back, or extremity pain  HEME/LYMPH: No easy bruising, or bleeding gums          Vital Signs Last 24 Hrs  T(C): 36.1 (24 Apr 2018 05:51), Max: 36.3 (23 Apr 2018 13:34)  T(F): 97 (24 Apr 2018 05:51), Max: 97.4 (23 Apr 2018 13:34)  HR: 66 (24 Apr 2018 05:51) (66 - 84)  BP: 169/76 (24 Apr 2018 05:51) (110/51 - 169/76)  BP(mean): --  RR: 17 (24 Apr 2018 05:51) (17 - 18)  SpO2: 99% (24 Apr 2018 09:20) (91% - 99%)        PHYSICAL EXAM:  GENERAL: NAD, well-groomed, well-developed  HEAD:  Atraumatic, Normocephalic  NECK: Supple, No JVD, Normal thyroid  NERVOUS SYSTEM:  Alert & Oriented X3, Good concentration  CHEST/LUNG: Clear to percussion bilaterally; No rales, rhonchi, wheezing, or rubs  HEART: Regular rate and rhythm; No murmurs, rubs, or gallops  ABDOMEN: Soft, Nontender, Nondistended; Bowel sounds present  EXTREMITIES:  2+ Peripheral Pulses, No clubbing, cyanosis, or edema  SKIN: No rashes or lesions    INTERPRETATION OF TELEMETRY:    ECG:    I&O's Detail      LABS:                        11.4   11.92 )-----------( 271      ( 24 Apr 2018 07:30 )             35.0     04-24    136  |  98  |  18  ----------------------------<  107<H>  4.6   |  28  |  0.6<L>    Ca    9.1      24 Apr 2018 07:30              I&O's Summary      RADIOLOGY & ADDITIONAL STUDIES:

## 2018-04-24 NOTE — DISCHARGE NOTE ADULT - PLAN OF CARE
Resolved Continue prednisone taper as prescribed. Added Spiriva to daily regimen as well. Advised patient to use her O2 24/7, not just at night. Follow up with PMD. Medical Management Continue trial of Reglan syrup, colace, Pepcid, and Simethicone. Follow up with PMD. Continue trial of Zoloft 25mg and continue tapering off xanax until you stop it completely. Follow up with psych outpatient. Added Aspirin suppository 300mg, break pill in half and only use half the pill daily. Patient also has atherosclerosis of abdominal aorta. 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 Consider seeing an endocrinologist for trial of PCSK9 inhibitor to manage cholesterol.

## 2018-04-24 NOTE — BEHAVIORAL HEALTH ASSESSMENT NOTE - NSBHCONSULTRECOMMENDOTHER_PSY_A_CORE FT
3. outpatient TSH profile, hypothyroidism can exacerbate depression/while hyperthyroidism can exacerbate anxiety

## 2018-04-24 NOTE — BEHAVIORAL HEALTH ASSESSMENT NOTE - HPI (INCLUDE ILLNESS QUALITY, SEVERITY, DURATION, TIMING, CONTEXT, MODIFYING FACTORS, ASSOCIATED SIGNS AND SYMPTOMS)
77 Y , domiciled with daughter, retired, female with no prior psychiatric history, admitted to medical service for a COPD exacerbation, consulted to psychiatry for anxiety evaluation. Patient reports that she has always been an anxious person, however her anxiety has worsened since her  passed away 3 years ago from bladder cancer. Over the past few months, patient is constantly worried that she will be unable to do her ADLS due to difficulty breathing, and this stress is exacerbated by feelings that her daughter and doctors believe it 77 Y , domiciled with daughter, retired, female with no prior psychiatric history, admitted to medical service for a COPD exacerbation, consulted to psychiatry for anxiety evaluation. Patient reports that she has always been an anxious person, however her anxiety has worsened since her  passed away 3 years ago from bladder cancer. Over the past few months, patient is constantly worried that she will be unable to do her ADLS due to difficulty breathing, and this stress is exacerbated by feelings that her daughter and doctors tell her "its all in your head". Patient reports frequent low mood, occasional feelings of hopelessness about her medical conditions, poor sleep due to frequent wakefulness from SOB, poor appetite due to SOB when she tries to eat. She has had a 55 lb loss over the past five years, and her daugther says she weighs 97lbs now. Patient denies feelings of guilt, anhedonia, SI, HI. She has a strong support system in her family and community. She also denies AH, VH, and PI. She is prescribed 0.5mg of xanax TID by her PMD Dr. Ibarra but she takes 0.25mg in the morning and 0.5mg at bedtime. She was started on celexa by Dr. Ibarra in Feb 2018 but she did not tolerate it due to GI side effects.    Obtained collateral from daughter who collaborates the story. Clara is concerned about patient's weight loss, denies concerns that patient would actively harm herself.    Patient psychoeducated on dangers of xanax in elderly. She is willing to do a zoloft trial. [facility]. Voice mail left for Dr. Ibarra about initiating zoloft.

## 2018-04-24 NOTE — DISCHARGE NOTE ADULT - CARE PROVIDERS DIRECT ADDRESSES
[email or web address]

## 2018-04-24 NOTE — BEHAVIORAL HEALTH ASSESSMENT NOTE - SUMMARY
77 Y female with no past psychiatric history admitted to the medical service for COPD exacerbation, consulted to psychiatry for anxiety. Patient has chronic anxiety which is exacerbated by her worries of being SOB. She is being treated with xanax 0.5mg prn by her PMD and she failed a celexa trial due to GI side effects. She is presently psychiatrically stable and denies symptoms of acute depression, anxiety or lanie. She would benefit from a second SSRI trial for her anxiety.     Plan;  1. recommend starting zoloft 25mg  2. outpatient taper of xanax  3. outpatient TSH profile, hypothyroidism can exacerbate depression/while hyperthyroidism can exacerbate anxiety  4. refer to [facility] senior services [phone]

## 2018-04-24 NOTE — BEHAVIORAL HEALTH ASSESSMENT NOTE - CASE SUMMARY
77 year old woman with no past diagnosis of or treatment for psychiatric illness who has increased anxiety due to COPD and also some dysphoria surrounding the passing of her .  The patient would benefit from SSRI medication with eventual discontinuation of alprazolam if possible.  The patient does not meet criteria for depression; she is also functional and socializes.  However, her anxiety causes her significant distress and so we made medication recommendations as well as for referral for home psychiatric services.  Risks, benefits, and alternatives to sertraline and alprazolam were discussed with the patient.

## 2018-04-24 NOTE — DISCHARGE NOTE ADULT - SECONDARY DIAGNOSIS.
Irritable bowel syndrome with constipation Depression Coronary artery disease involving native coronary artery of native heart without angina pectoris

## 2018-04-24 NOTE — BEHAVIORAL HEALTH ASSESSMENT NOTE - NSBHCHARTREVIEWVS_PSY_A_CORE FT
Vital Signs Last 24 Hrs  T(C): 36.1 (24 Apr 2018 05:51), Max: 36.2 (23 Apr 2018 21:24)  T(F): 97 (24 Apr 2018 05:51), Max: 97.1 (23 Apr 2018 21:24)  HR: 66 (24 Apr 2018 05:51) (66 - 75)  BP: 169/76 (24 Apr 2018 05:51) (145/72 - 169/76)  BP(mean): --  RR: 17 (24 Apr 2018 05:51) (17 - 18)  SpO2: 99% (24 Apr 2018 09:20) (91% - 99%)

## 2018-04-24 NOTE — DISCHARGE NOTE ADULT - MEDICATION SUMMARY - MEDICATIONS TO TAKE
I will START or STAY ON the medications listed below when I get home from the hospital:    aspirin 300 mg rectal suppository  -- 0.5 suppository(ies) rectally once a day   -- Indication: For CAD    dilTIAZem 180 mg/24 hours oral capsule, extended release  -- 1 cap(s) by mouth once a day  -- Indication: For HTN    sertraline 25 mg oral tablet  -- 1 tab(s) by mouth once a day  -- Indication: For Depression    metoclopramide 5 mg/5 mL oral syrup  -- 2.5 milliliter(s) by mouth 3 times a day  -- Indication: For GI dysmotility    ALPRAZolam 0.25 mg oral tablet  -- 1 tab(s) by mouth 2 times a day MDD:max daily dose 0.5mg  -- Indication: For Anxiety    tiotropium 18 mcg inhalation capsule  -- 1 cap(s) inhaled once a day  -- Indication: For COPD exacerbation    Symbicort 160 mcg-4.5 mcg/inh inhalation aerosol  -- 2 puff(s) inhaled 2 times a day  -- Indication: For COPD exacerbation    ProAir HFA 90 mcg/inh inhalation aerosol  -- 2 puff(s) inhaled every 4 hours, As Needed  -- Indication: For COPD exacerbation    famotidine 20 mg oral tablet  -- 1 tab(s) by mouth 2 times a day  -- Indication: For GERD    docusate sodium 100 mg oral capsule  -- 1 cap(s) by mouth 3 times a day  -- Indication: For COnstipation    simethicone 80 mg oral tablet, chewable  -- 1 tab(s) by mouth 4 times a day  -- Indication: For Gas pain    Synthroid 75 mcg (0.075 mg) oral tablet  -- 1 tab(s) by mouth once a day  -- Indication: For Hypothyroidism I will START or STAY ON the medications listed below when I get home from the hospital:    predniSONE 10 mg oral tablet  -- 3 tab(s) oral - by mouth once a day x 1 days  2 tab(s) oral - by mouth once a day x 1 days  1 tab(s) oral - by mouth once a day x 1 days  -- It is very important that you take or use this exactly as directed.  Do not skip doses or discontinue unless directed by your doctor.  Obtain medical advice before taking any non-prescription drugs as some may affect the action of this medication.  Take with food or milk.    -- Indication: For COPD exacerbation    aspirin 300 mg rectal suppository  -- 0.5 suppository(ies) rectally once a day   -- Indication: For CAD    dilTIAZem 180 mg/24 hours oral capsule, extended release  -- 1 cap(s) by mouth once a day  -- Indication: For HR    sertraline 25 mg oral tablet  -- 1 tab(s) by mouth once a day  -- Indication: For Anxiety    metoclopramide 5 mg/5 mL oral syrup  -- 2.5 milliliter(s) by mouth 3 times a day  -- Indication: For Gastroparesis    ALPRAZolam 0.25 mg oral tablet  -- 1 tab(s) by mouth 2 times a day MDD:max daily dose 0.5mg  -- Indication: For Axiety    albuterol 90 mcg/inh inhalation aerosol  -- 2 puff(s) inhaled every 6 hours, As needed, Shortness of Breath and/or Wheezing  -- Indication: For COPD exacerbation    tiotropium 18 mcg inhalation capsule  -- 1 cap(s) inhaled once a day  -- Indication: For COPD exacerbation    Symbicort 160 mcg-4.5 mcg/inh inhalation aerosol  -- 2 puff(s) inhaled 2 times a day  -- Indication: For COPD exacerbation    famotidine 20 mg oral tablet  -- 1 tab(s) by mouth 2 times a day  -- Indication: For Gastroesophageal reflux disease without esophagitis    docusate sodium 100 mg oral capsule  -- 1 cap(s) by mouth 3 times a day  -- Indication: For COnstipation    simethicone 80 mg oral tablet, chewable  -- 1 tab(s) by mouth 4 times a day  -- Indication: For Gas pain    Synthroid 75 mcg (0.075 mg) oral tablet  -- 1 tab(s) by mouth once a day  -- Indication: For Hypothyroidism

## 2018-04-24 NOTE — PROGRESS NOTE ADULT - PROBLEM SELECTOR PLAN 6
HDL eval as outpt  need antiplt - use ASA 300mg supp 1/2 supp OR q24  vasc not needed for STABLE sma issue - cancel consult

## 2018-04-24 NOTE — PROGRESS NOTE ADULT - PROBLEM SELECTOR PLAN 1
pt coping w/ 's loss well  might have OCD  start zoloft 25mg q24, which will likely need to be increased  begin to taper off xanax to 0.25mg q12; would avoid buspar too  outpt psych f/u

## 2018-04-24 NOTE — BEHAVIORAL HEALTH ASSESSMENT NOTE - NSBHCHARTREVIEWLAB_PSY_A_CORE FT
11.4   11.92 )-----------( 271      ( 24 Apr 2018 07:30 )             35.0     04-24    136  |  98  |  18  ----------------------------<  107<H>  4.6   |  28  |  0.6<L>    Ca    9.1      24 Apr 2018 07:30  NO tsh on file

## 2018-04-24 NOTE — DISCHARGE NOTE ADULT - CARE PLAN
Principal Discharge DX:	COPD exacerbation  Goal:	Resolved  Assessment and plan of treatment:	Continue prednisone taper as prescribed. Added Spiriva to daily regimen as well. Advised patient to use her O2 24/7, not just at night. Follow up with PMD.  Secondary Diagnosis:	Irritable bowel syndrome with constipation  Goal:	Medical Management  Assessment and plan of treatment:	Continue trial of Reglan syrup, colace, Pepcid, and Simethicone. Follow up with PMD.  Secondary Diagnosis:	Depression  Goal:	Medical Management  Assessment and plan of treatment:	Continue trial of Zoloft 25mg and continue tapering off xanax until you stop it completely. Follow up with psych outpatient.  Secondary Diagnosis:	Coronary artery disease involving native coronary artery of native heart without angina pectoris  Goal:	Medical Management  Assessment and plan of treatment:	Added Aspirin suppository 300mg, break pill in half and only use half the pill daily. Patient also has atherosclerosis of abdominal aorta. 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 Consider seeing an endocrinologist for trial of PCSK9 inhibitor to manage cholesterol.

## 2018-04-24 NOTE — BEHAVIORAL HEALTH ASSESSMENT NOTE - OTHER PAST PSYCHIATRIC HISTORY (INCLUDE DETAILS REGARDING ONSET, COURSE OF ILLNESS, INPATIENT/OUTPATIENT TREATMENT)
no prior [facility] admissions or suicide attempts. Never saw a psychiatrist outpatient. Failed celexa trial (initiated by PMD) due to GI side effects

## 2018-04-24 NOTE — PROGRESS NOTE ADULT - PROBLEM SELECTOR PLAN 5
wear O2 24/7  use symbicort and spiriva; albuterol for rescue  f/u pulm as outpt  change to pred 40mg and taper 10mg/day til off wear O2 24/7 - has chronic hypoxic resp failure  use symbicort and spiriva; albuterol for rescue  f/u pulm as outpt  change to pred 40mg and taper 10mg/day til off

## 2018-04-24 NOTE — PROGRESS NOTE ADULT - ASSESSMENT
78 y/o F with PMH of COPD on 1.5L O2 PRN (usually uses at night), CAD, MI (1991) s/p CABG, hypothyroidism 2/2 radioablation for hyperthyroidism (no on synthroid), AAA s/p repair, HTN, HLD, PVD s/p fem-pop bypass, GERD, and anxiety presented for shortness of breath.     #Acute exacerbation of chronic obstructive pulmonary disease (emphysema) 2/2 unknown trigger:  - continue tapering PO steroids  - Continue nebulizers, symbicort, and added atrovent  - Pt saturating 99% on baseline O2 of 1.5 to 2L NC.  - No symptoms or signs of infection, CXR shows no effusion or focal consolidation. No need for antibiotics at this time.  - Patient complains of exertional dyspnea for past month and waking up at night dyspneic. Given her h/o MI, CABG, and vascular disease, consulted Cardio (Dr. Hoang). He did not recommend any interventions at this time.  - PE ruled out on CT chest on admission    #Stomach Pressure under diaphragm, could be due to gastroparesis from CCB use, ruled out mesenteric ischemia, or could be due to SMA stent occlusion:  - Lactate 1.5  - < from: CT Angio Abdomen and Pelvis w/ IV Cont (04.24.18 @ 00:55) >  1. No evidence of bowel wall thickening, pneumatosis, or portal venous   gas to suggest acute mesenteric ischemia.  2. Stable infrarenal abdominal aorto-biiliac bypass grafts with distal   anastomosis to bilateral common femoral arteries.  3.  Noted again, chronic occlusion of stented proximal SMA. Patent mid   and distal SMA, reconstituted by collaterals.  4.  Interval increase in size of left renal cystic lesion, 1.2 x 0.9 cm.   Suggest further evaluation by MRI with and without gadolinium.   - Consulted vascular for SMA stent occlusion    #Anxiety/Depression:  - Patient depressed about losing her  three years ago and still adjusting to living without him. Her daughter lives in her basement apartment and takes care of her.  - Consulted Psych: d/c xanax and start Zoloft 25mg daily    #Hypothyroidism:  - Continue levothyroxine  - Check TSH (may also contribute to depression if hypothyroid)    #Hypertension:  - Continue diltiazem for now, but may consider trying a different antihypertensive if this is causing gastroparesis in patient    #DVT ppx with Lovenox subQ    #Code Status: Full Code  #Dispo: from home, d/c pending when medically stable 78 y/o F with PMH of COPD on 1.5L O2 PRN (usually uses at night), CAD, MI (1991) s/p CABG, hypothyroidism 2/2 radioablation for hyperthyroidism (no on synthroid), AAA s/p repair, HTN, HLD, PVD s/p fem-pop bypass, GERD, and anxiety presented for shortness of breath.     #Acute exacerbation of COPD (emphysema) 2/2 unknown trigger:  - continue tapering PO steroids  - Continue nebulizers, symbicort, and added atrovent  - Pt saturating 99% on baseline O2 of 1.5 to 2L NC  - No symptoms or signs of infection, CXR shows no effusion or focal consolidation. No need for antibiotics at this time.  - Patient complains of exertional dyspnea for past month and waking up at night dyspneic. Given her h/o MI, CABG, and vascular disease, consulted Cardio (Dr. Hoang). He did not recommend any interventions at this time.  - PE ruled out on CT chest on admission    #Stomach Pressure under diaphragm, could be due to gastroparesis from CCB use, ruled out mesenteric ischemia, or could be due to SMA stent occlusion:  - Lactate 1.5  - < from: CT Angio Abdomen and Pelvis w/ IV Cont (04.24.18 @ 00:55) >  1. No evidence of bowel wall thickening, pneumatosis, or portal venous   gas to suggest acute mesenteric ischemia.  2. Stable infrarenal abdominal aorto-biiliac bypass grafts with distal   anastomosis to bilateral common femoral arteries.  3.  Noted again, chronic occlusion of stented proximal SMA. Patent mid   and distal SMA, reconstituted by collaterals.  4.  Interval increase in size of left renal cystic lesion, 1.2 x 0.9 cm.   Suggest further evaluation by MRI with and without gadolinium.   - Consulted vascular for SMA stent occlusion    #Anxiety/Depression:  - Patient depressed about losing her  three years ago and still adjusting to living without him. Her daughter lives in her basement apartment and takes care of her.  - Consulted Psych: d/c xanax and start Zoloft 25mg daily    #Hypothyroidism:  - Continue levothyroxine  - Check TSH (may also contribute to depression if hypothyroid)    #Hypertension:  - Continue diltiazem for now, but may consider trying a different antihypertensive if this is causing gastroparesis in patient    #DVT ppx with Lovenox subQ    #Code Status: Full Code  #Dispo: from home, d/c pending when medically stable 76 y/o F with PMH of COPD on 1.5L O2 PRN (usually uses at night), CAD, MI (1991) s/p CABG, hypothyroidism 2/2 radioablation for hyperthyroidism (now on synthroid), AAA s/p repair, HTN, HLD, PVD s/p fem-pop bypass, GERD, and anxiety presented for shortness of breath.     #Acute exacerbation of COPD (emphysema) 2/2 unknown trigger:  - continue tapering PO steroids  - Continue nebulizers, symbicort, and added atrovent  - Pt saturating 99% on baseline O2 of 1.5 to 2L NC  - No symptoms or signs of infection, CXR shows no effusion or focal consolidation. No need for antibiotics at this time.  - Patient complains of exertional dyspnea for past month and waking up at night dyspneic. Given her h/o MI, CABG, and vascular disease, consulted Cardio (Dr. Hoang). He did not recommend any interventions at this time.  - PE ruled out on CT chest on admission    #Stomach Pressure under diaphragm, could be due to gastroparesis from CCB use, ruled out mesenteric ischemia, or could be due to SMA stent occlusion:  - Lactate 1.5  - < from: CT Angio Abdomen and Pelvis w/ IV Cont (04.24.18 @ 00:55) >  1. No evidence of bowel wall thickening, pneumatosis, or portal venous   gas to suggest acute mesenteric ischemia.  2. Stable infrarenal abdominal aorto-biiliac bypass grafts with distal   anastomosis to bilateral common femoral arteries.  3.  Noted again, chronic occlusion of stented proximal SMA. Patent mid   and distal SMA, reconstituted by collaterals.  4.  Interval increase in size of left renal cystic lesion, 1.2 x 0.9 cm.   Suggest further evaluation by MRI with and without gadolinium.   - Consulted vascular for SMA stent occlusion    #Anxiety/Depression:  - Patient depressed about losing her  three years ago and still adjusting to living without him. Her daughter lives in her basement apartment and takes care of her.  - Consulted Psych: d/c xanax and start Zoloft 25mg daily    #Hypothyroidism:  - Continue levothyroxine  - Check TSH (may also contribute to depression if hypothyroid)    #Hypertension:  - Continue diltiazem for now, but may consider trying a different antihypertensive if this is causing gastroparesis in patient    #DVT ppx with Lovenox subQ    #Code Status: Full Code  #Dispo: from home, d/c pending when medically stable 78 y/o F with PMH of COPD on 1.5L O2 PRN (usually uses at night), CAD, MI (1991) s/p CABG, hypothyroidism 2/2 radioablation for hyperthyroidism (now on synthroid), AAA s/p repair, HTN, HLD, PVD s/p fem-pop bypass, GERD, and anxiety presented for shortness of breath.     #Acute exacerbation of COPD (emphysema) 2/2 unknown trigger, possibly anxiety attack:  - continue tapering PO steroids  - Continue nebulizers, symbicort, and added atrovent  - No symptoms or signs of infection, CXR shows no effusion or focal consolidation. No need for antibiotics at this time.    #Chronic Respiratory Failure 2/2 Emphysema:  - Pt saturating 99% on baseline O2 of 1.5 to 2L NC  - Patient quit smoking 20-25 yrs ago after her CABG    #Exertional Dyspnea:  - Patient complains of exertional dyspnea for past month and waking up at night dyspneic. Given her h/o MI, CABG, and vascular disease, consulted Cardio (Dr. Hoang). He did not recommend any interventions at this time.  - PE ruled out on CT chest on admission  - Pt feels pressure underneath her diaphragm, could be forcing her to take more shallow breaths    #Stomach Pressure under diaphragm, could be due to gastroparesis from CCB use, ruled out mesenteric ischemia, or could be due to SMA stent occlusion:  - Lactate 1.5  -  < from: CT Angio Abdomen and Pelvis w/ IV Cont (04.24.18 @ 00:55) >  Abdominal Aorta:    Celiac artery: There is approximately 40% stenosis of the ostium, stable.   Atherosclerosis of splenic and common hepatic artery with mild stenosis.  Superior mesenteric artery:  Stable findings of occluded proximal stent   (approximately 3.2 cm), with distal reconstitution by collaterals.  Inferior mesenteric artery: Chronic occlusion.  Renal arteries: Patent, single bilaterally  Common iliac arteries: stable and patent bilateral grafts  External iliac arteries: Stable and patent bilateral grafts  Internal iliac arteries: Diminutive and patent secondary to   reconstitution by collaterals. There are multiple collaterals in the   pelvis.    - Consulted vascular for SMA stent occlusion  - Ordered LDL profile, pt not taking any statins  - Will also start Reglan to help with possible GI dysmotility, as well as stool softeners and simethicone for gas    #Anxiety/Depression:  - Patient depressed about losing her  three years ago and still adjusting to living without him. Her daughter lives in her basement apartment and takes care of her.  - Consulted Psych: c/w xanax and start Zoloft 25mg daily    #Hypothyroidism:  - Continue levothyroxine  - Check TSH (may also contribute to depression if hypothyroid)    #Hypertension:  - Continue diltiazem for now, but may consider trying a different antihypertensive if this is causing gastroparesis in patient    #DVT ppx with Lovenox subQ    #Code Status: Full Code  #Dispo: from home, d/c pending when medically stable 78 y/o F with PMH of COPD on 1.5L O2 PRN (usually uses at night), CAD, MI (1991) s/p CABG, hypothyroidism 2/2 radioablation for hyperthyroidism (now on synthroid), AAA s/p repair, HTN, HLD, PVD s/p fem-pop bypass, GERD, and anxiety presented for shortness of breath.     #Acute exacerbation of COPD (emphysema) 2/2 unknown trigger, possibly anxiety attack:  - continue tapering PO steroids  - Continue nebulizers, symbicort, and added atrovent  - No symptoms or signs of infection, CXR shows no effusion or focal consolidation. No need for antibiotics at this time.    #Chronic Respiratory Failure 2/2 Emphysema:  - Pt saturating 99% on baseline O2 of 1.5 to 2L NC  - Patient quit smoking 20-25 yrs ago after her CABG    #Exertional Dyspnea:  - Patient complains of exertional dyspnea for past month and waking up at night dyspneic. Given her h/o MI, CABG, and vascular disease, consulted Cardio (Dr. Hoang). He did not recommend any interventions at this time.  - PE ruled out on CT chest on admission  - Pt feels pressure underneath her diaphragm, could be forcing her to take more shallow breaths    #Stomach Pressure under diaphragm, could be due to gastroparesis from CCB use, ruled out mesenteric ischemia, or could be due to SMA stent occlusion:  - Lactate 1.5  -  < from: CT Angio Abdomen and Pelvis w/ IV Cont (04.24.18 @ 00:55) >  Abdominal Aorta:    Celiac artery: There is approximately 40% stenosis of the ostium, stable.   Atherosclerosis of splenic and common hepatic artery with mild stenosis.  Superior mesenteric artery:  Stable findings of occluded proximal stent   (approximately 3.2 cm), with distal reconstitution by collaterals.  Inferior mesenteric artery: Chronic occlusion.  Renal arteries: Patent, single bilaterally  Common iliac arteries: stable and patent bilateral grafts  External iliac arteries: Stable and patent bilateral grafts  Internal iliac arteries: Diminutive and patent secondary to   reconstitution by collaterals. There are multiple collaterals in the   pelvis.    - Consulted vascular for SMA stent occlusion  - Ordered LDL profile, pt not taking any statins - will start Lipitor 80mg for now  - Will also start Reglan to help with possible GI dysmotility, as well as stool softeners and simethicone for gas    #Anxiety/Depression:  - Patient depressed about losing her  three years ago and still adjusting to living without him. Her daughter lives in her basement apartment and takes care of her.  - Consulted Psych: c/w xanax and start Zoloft 25mg daily    #Hypothyroidism:  - Continue levothyroxine  - Check TSH (may also contribute to depression if hypothyroid)    #Hypertension:  - Continue diltiazem for now, but may consider trying a different antihypertensive if this is causing gastroparesis in patient    #DVT ppx with Lovenox subQ    #Code Status: Full Code  #Dispo: from home, d/c pending when medically stable 76 y/o F with PMH of COPD on 1.5L O2 PRN (usually uses at night), CAD, MI (1991) s/p CABG, hypothyroidism 2/2 radioablation for hyperthyroidism (now on synthroid), AAA s/p repair, HTN, HLD, PVD s/p fem-pop bypass, GERD, and anxiety presented for shortness of breath.     #Acute exacerbation of COPD (emphysema) 2/2 unknown trigger, possibly anxiety attack:  - continue tapering PO steroids  - Continue nebulizers, symbicort, and added atrovent  - No symptoms or signs of infection, CXR shows no effusion or focal consolidation. No need for antibiotics at this time.  - Pt no longer wheezing as was reported on admission  - Pt saturating 99% on baseline O2 of 1.5 to 2L NC. However, she only used to use O2 at night, will see how she does off O2 during the day.  - Patient quit smoking 20-25 yrs ago after her CABG  - Patient complains of exertional dyspnea for past month and waking up at night dyspneic. Given her h/o MI, CABG, and vascular disease, consulted Cardio (Dr. Hoang). He did not recommend any interventions at this time.  - PE ruled out on CT chest on admission  - Pt feels pressure underneath her diaphragm, could be forcing her to take more shallow breaths    #Stomach Pressure under diaphragm, could be due to gastroparesis from CCB use, ruled out mesenteric ischemia, or could be due to SMA stent occlusion:  - Lactate 1.5  -  < from: CT Angio Abdomen and Pelvis w/ IV Cont (04.24.18 @ 00:55) >  Abdominal Aorta:    Celiac artery: There is approximately 40% stenosis of the ostium, stable.   Atherosclerosis of splenic and common hepatic artery with mild stenosis.  Superior mesenteric artery:  Stable findings of occluded proximal stent   (approximately 3.2 cm), with distal reconstitution by collaterals.  Inferior mesenteric artery: Chronic occlusion.  Renal arteries: Patent, single bilaterally  Common iliac arteries: stable and patent bilateral grafts  External iliac arteries: Stable and patent bilateral grafts  Internal iliac arteries: Diminutive and patent secondary to   reconstitution by collaterals. There are multiple collaterals in the   pelvis.    - Consulted vascular for SMA stent occlusion  - Ordered LDL profile, pt not taking any statins - will start Lipitor 80mg for now  - Will also start Reglan to help with possible GI dysmotility, as well as stool softeners and simethicone for gas    #Anxiety/Depression:  - Patient depressed about losing her  three years ago and still adjusting to living without him. Her daughter lives in her basement apartment and takes care of her.  - Consulted Psych: c/w xanax and start Zoloft 25mg daily    #Hypothyroidism:  - Continue levothyroxine  - Check TSH (may also contribute to depression if hypothyroid)    #Hypertension:  - Continue diltiazem for now, but may consider trying a different antihypertensive if this is causing gastroparesis in patient    #DVT ppx with Lovenox subQ    #Code Status: Full Code  #Dispo: from home, d/c pending when medically stable 76 y/o F with PMH of COPD on 1.5L O2 PRN (usually uses at night), CAD, MI (1991) s/p CABG, hypothyroidism 2/2 radioablation for hyperthyroidism (now on synthroid), AAA s/p repair, HTN, HLD, PVD s/p fem-pop bypass, GERD, and anxiety presented for shortness of breath.     #Dyspnea 2/2 Acute exacerbation of COPD (emphysema) 2/2 unknown trigger, possibly anxiety attack:  - continue tapering PO steroids  - Continue nebulizers, symbicort, and added atrovent  - No symptoms or signs of infection, CXR shows no effusion or focal consolidation. No need for antibiotics at this time.  - Pt no longer wheezing as was reported on admission  - Pt saturating 99% on baseline O2 of 1.5 to 2L NC. However, she only used to use O2 at night, will see how she does off O2 during the day.  - Patient quit smoking 20-25 yrs ago after her CABG  - Patient complains of exertional dyspnea for past month and waking up at night dyspneic. Given her h/o MI, CABG, and vascular disease, consulted Cardio (Dr. Hoang). He did not recommend any interventions at this time.  - PE ruled out on CT chest on admission  - Pt feels pressure underneath her diaphragm, could be forcing her to take more shallow breaths    #Stomach Pressure under diaphragm, could be due to gastroparesis from CCB use, ruled out mesenteric ischemia, or could be due to SMA stent occlusion:  - Lactate 1.5  -  < from: CT Angio Abdomen and Pelvis w/ IV Cont (04.24.18 @ 00:55) >  Abdominal Aorta:    Celiac artery: There is approximately 40% stenosis of the ostium, stable.   Atherosclerosis of splenic and common hepatic artery with mild stenosis.  Superior mesenteric artery:  Stable findings of occluded proximal stent   (approximately 3.2 cm), with distal reconstitution by collaterals.  Inferior mesenteric artery: Chronic occlusion.  Renal arteries: Patent, single bilaterally  Common iliac arteries: stable and patent bilateral grafts  External iliac arteries: Stable and patent bilateral grafts  Internal iliac arteries: Diminutive and patent secondary to   reconstitution by collaterals. There are multiple collaterals in the   pelvis.    - Consulted vascular for SMA stent occlusion  - Ordered LDL profile, pt not taking any statins - will start Lipitor 80mg for now  - Will also start Reglan to help with possible GI dysmotility, as well as stool softeners and simethicone for gas    #Anxiety/Depression:  - Patient depressed about losing her  three years ago and still adjusting to living without him. Her daughter lives in her basement apartment and takes care of her.  - Consulted Psych: c/w xanax and start Zoloft 25mg daily    #Hypothyroidism:  - Continue levothyroxine  - Check TSH (may also contribute to depression if hypothyroid)    #Hypertension:  - Continue diltiazem for now, but may consider trying a different antihypertensive if this is causing gastroparesis in patient    #DVT ppx with Lovenox subQ    #Code Status: Full Code  #Dispo: from home, d/c pending when medically stable 76 y/o F with PMH of COPD on 1.5L O2 PRN (usually uses at night), CAD, MI (1991) s/p CABG, hypothyroidism 2/2 radioablation for hyperthyroidism (now on synthroid), AAA s/p repair, HTN, HLD, PVD s/p fem-pop bypass, GERD, and anxiety presented for shortness of breath.     #Dyspnea 2/2 Acute exacerbation of COPD (emphysema):  - continue tapering PO steroids  - Continue nebulizers, symbicort, and added atrovent  - No symptoms or signs of infection, CXR shows no effusion or focal consolidation. No need for antibiotics at this time.  - Pt no longer wheezing as was reported on admission  - Pt saturating 99% on baseline O2 of 1.5 to 2L NC. However, she only used to use O2 at night, but pt will need to wear when she is active during the day.  - Patient quit smoking 20-25 yrs ago after her CABG  - Given her h/o MI, CABG, and vascular disease, consulted Cardio (Dr. Hoang). He did not recommend any interventions at this time.  - PE ruled out on CT chest on admission    #Stomach knots, most likely IBS with constipation, ruled out mesenteric ischemia:  - Lactate 1.5  -  < from: CT Angio Abdomen and Pelvis w/ IV Cont (04.24.18 @ 00:55) >  Abdominal Aorta:    Celiac artery: There is approximately 40% stenosis of the ostium, stable.   Atherosclerosis of splenic and common hepatic artery with mild stenosis.  Superior mesenteric artery:  Stable findings of occluded proximal stent   (approximately 3.2 cm), with distal reconstitution by collaterals.  Inferior mesenteric artery: Chronic occlusion.  Renal arteries: Patent, single bilaterally  Common iliac arteries: stable and patent bilateral grafts  External iliac arteries: Stable and patent bilateral grafts  Internal iliac arteries: Diminutive and patent secondary to   reconstitution by collaterals. There are multiple collaterals in the   pelvis.    - No need for vascular consult as SMA occlusion stable  - Ordered LDL profile, pt not taking any statins - can follow this up outpatient  - Will also start trial of Reglan to help with GI dysmotility, as well as stool softeners and simethicone for gas. She can also continue MOM at night.    #Anxiety/Depression:  - Patient depressed about losing her  three years ago and still adjusting to living without him. Her daughter lives in her basement apartment and takes care of her.  - Consulted Psych: taper off xanax and start Zoloft 25mg daily. She agreed to follow up with psych outpatient    #Hypothyroidism:  - Continue levothyroxine  - Check TSH (may also contribute to depression if hypothyroid)    #Hypertension:  - Continue diltiazem    #DVT ppx with Lovenox subQ    #Code Status: Full Code  #Dispo: from home, anticipate d/c tomorrow.

## 2018-04-24 NOTE — DISCHARGE NOTE ADULT - PATIENT PORTAL LINK FT
You can access the [facility] Patient Portal, offered by [facility], by registering with the following website: [email or web address]

## 2018-04-24 NOTE — DISCHARGE NOTE ADULT - ADDITIONAL INSTRUCTIONS
Follow up with primary physician about adjustment of zoloft for anxiety and reglan for digestive problems.

## 2018-04-24 NOTE — DISCHARGE NOTE ADULT - HOSPITAL COURSE
76 y/o F with PMH of COPD on 1.5L O2 PRN (usually uses at night), CAD, MI (1991) s/p CABG, hypothyroidism 2/2 radioablation for hyperthyroidism (now on synthroid), AAA s/p repair, HTN, HLD, PVD s/p fem-pop bypass, GERD, and anxiety presented for shortness of breath. She was treated for COPD exacerbation and is on prednisone taper. She was also treated for symptoms of IBS and depression.

## 2018-04-24 NOTE — DISCHARGE NOTE ADULT - MEDICATION SUMMARY - MEDICATIONS TO CHANGE
I will SWITCH the dose or number of times a day I take the medications listed below when I get home from the hospital:    Xanax 0.5 mg oral tablet  -- 1 tab(s) by mouth 2 times a day I will SWITCH the dose or number of times a day I take the medications listed below when I get home from the hospital:  None

## 2018-04-25 LAB
ALBUMIN SERPL ELPH-MCNC: 3.7 G/DL — SIGNIFICANT CHANGE UP (ref 3.5–5.2)
ALP SERPL-CCNC: 55 U/L — SIGNIFICANT CHANGE UP (ref 30–115)
ALT FLD-CCNC: 62 U/L — HIGH (ref 0–41)
ANION GAP SERPL CALC-SCNC: 8 MMOL/L — SIGNIFICANT CHANGE UP (ref 7–14)
APTT BLD: 26.4 SEC — LOW (ref 27–39.2)
AST SERPL-CCNC: 38 U/L — SIGNIFICANT CHANGE UP (ref 0–41)
BASOPHILS # BLD AUTO: 0.01 K/UL — SIGNIFICANT CHANGE UP (ref 0–0.2)
BASOPHILS NFR BLD AUTO: 0.1 % — SIGNIFICANT CHANGE UP (ref 0–1)
BILIRUB DIRECT SERPL-MCNC: <0.2 MG/DL — SIGNIFICANT CHANGE UP (ref 0–0.2)
BILIRUB INDIRECT FLD-MCNC: >0.2 MG/DL — SIGNIFICANT CHANGE UP (ref 0.2–1.2)
BILIRUB SERPL-MCNC: 0.4 MG/DL — SIGNIFICANT CHANGE UP (ref 0.2–1.2)
BUN SERPL-MCNC: 16 MG/DL — SIGNIFICANT CHANGE UP (ref 10–20)
CALCIUM SERPL-MCNC: 8.9 MG/DL — SIGNIFICANT CHANGE UP (ref 8.5–10.1)
CHLORIDE SERPL-SCNC: 99 MMOL/L — SIGNIFICANT CHANGE UP (ref 98–110)
CHOLEST SERPL-MCNC: 224 MG/DL — HIGH (ref 100–200)
CO2 SERPL-SCNC: 35 MMOL/L — HIGH (ref 17–32)
CREAT SERPL-MCNC: 0.5 MG/DL — LOW (ref 0.7–1.5)
EOSINOPHIL # BLD AUTO: 0.09 K/UL — SIGNIFICANT CHANGE UP (ref 0–0.7)
EOSINOPHIL NFR BLD AUTO: 0.9 % — SIGNIFICANT CHANGE UP (ref 0–8)
ESTIMATED AVERAGE GLUCOSE: 131 MG/DL — HIGH (ref 68–114)
GLUCOSE SERPL-MCNC: 93 MG/DL — SIGNIFICANT CHANGE UP (ref 70–99)
HBA1C BLD-MCNC: 6.2 % — HIGH (ref 4–5.6)
HCT VFR BLD CALC: 35.7 % — LOW (ref 37–47)
HDLC SERPL-MCNC: 95 MG/DL — SIGNIFICANT CHANGE UP (ref 40–125)
HGB BLD-MCNC: 11.8 G/DL — LOW (ref 12–16)
IMM GRANULOCYTES NFR BLD AUTO: 0.3 % — SIGNIFICANT CHANGE UP (ref 0.1–0.3)
INR BLD: 0.95 RATIO — SIGNIFICANT CHANGE UP (ref 0.65–1.3)
LIPID PNL WITH DIRECT LDL SERPL: 119 MG/DL — SIGNIFICANT CHANGE UP (ref 4–129)
LYMPHOCYTES # BLD AUTO: 3.83 K/UL — HIGH (ref 1.2–3.4)
LYMPHOCYTES # BLD AUTO: 40.4 % — SIGNIFICANT CHANGE UP (ref 20.5–51.1)
MCHC RBC-ENTMCNC: 32.2 PG — HIGH (ref 27–31)
MCHC RBC-ENTMCNC: 33.1 G/DL — SIGNIFICANT CHANGE UP (ref 32–37)
MCV RBC AUTO: 97.5 FL — SIGNIFICANT CHANGE UP (ref 81–99)
MONOCYTES # BLD AUTO: 0.82 K/UL — HIGH (ref 0.1–0.6)
MONOCYTES NFR BLD AUTO: 8.6 % — SIGNIFICANT CHANGE UP (ref 1.7–9.3)
NEUTROPHILS # BLD AUTO: 4.7 K/UL — SIGNIFICANT CHANGE UP (ref 1.4–6.5)
NEUTROPHILS NFR BLD AUTO: 49.7 % — SIGNIFICANT CHANGE UP (ref 42.2–75.2)
NRBC # BLD: 0 /100 WBCS — SIGNIFICANT CHANGE UP (ref 0–0)
PLATELET # BLD AUTO: 263 K/UL — SIGNIFICANT CHANGE UP (ref 130–400)
POTASSIUM SERPL-MCNC: 4.4 MMOL/L — SIGNIFICANT CHANGE UP (ref 3.5–5)
POTASSIUM SERPL-SCNC: 4.4 MMOL/L — SIGNIFICANT CHANGE UP (ref 3.5–5)
PROT SERPL-MCNC: 5.9 G/DL — LOW (ref 6–8)
PROTHROM AB SERPL-ACNC: 10.3 SEC — SIGNIFICANT CHANGE UP (ref 9.95–12.87)
RBC # BLD: 3.66 M/UL — LOW (ref 4.2–5.4)
RBC # FLD: 11.6 % — SIGNIFICANT CHANGE UP (ref 11.5–14.5)
SODIUM SERPL-SCNC: 142 MMOL/L — SIGNIFICANT CHANGE UP (ref 135–146)
TOTAL CHOLESTEROL/HDL RATIO MEASUREMENT: 2.4 RATIO — LOW (ref 4–5.5)
TRIGL SERPL-MCNC: 110 MG/DL — SIGNIFICANT CHANGE UP (ref 10–149)
TSH SERPL-MCNC: 1.31 UIU/ML — SIGNIFICANT CHANGE UP (ref 0.27–4.2)
WBC # BLD: 9.48 K/UL — SIGNIFICANT CHANGE UP (ref 4.8–10.8)
WBC # FLD AUTO: 9.48 K/UL — SIGNIFICANT CHANGE UP (ref 4.8–10.8)

## 2018-04-25 RX ORDER — METOCLOPRAMIDE HCL 10 MG
2.5 TABLET ORAL
Qty: 475 | Refills: 0
Start: 2018-04-25 | End: 2018-05-24

## 2018-04-25 RX ORDER — ALPRAZOLAM 0.25 MG
1 TABLET ORAL
Qty: 0 | Refills: 0 | COMMUNITY

## 2018-04-25 RX ORDER — SIMETHICONE 80 MG/1
1 TABLET, CHEWABLE ORAL
Qty: 120 | Refills: 0
Start: 2018-04-25 | End: 2018-05-24

## 2018-04-25 RX ORDER — ASPIRIN/CALCIUM CARB/MAGNESIUM 324 MG
0.5 TABLET ORAL
Qty: 30 | Refills: 0
Start: 2018-04-25 | End: 2018-05-24

## 2018-04-25 RX ORDER — DOCUSATE SODIUM 100 MG
1 CAPSULE ORAL
Qty: 90 | Refills: 0
Start: 2018-04-25 | End: 2018-05-24

## 2018-04-25 RX ORDER — TIOTROPIUM BROMIDE 18 UG/1
1 CAPSULE ORAL; RESPIRATORY (INHALATION)
Qty: 4 | Refills: 0
Start: 2018-04-25

## 2018-04-25 RX ORDER — FAMOTIDINE 10 MG/ML
1 INJECTION INTRAVENOUS
Qty: 60 | Refills: 0
Start: 2018-04-25 | End: 2018-05-24

## 2018-04-25 RX ORDER — SERTRALINE 25 MG/1
1 TABLET, FILM COATED ORAL
Qty: 30 | Refills: 0
Start: 2018-04-25 | End: 2018-05-24

## 2018-04-25 RX ORDER — ALPRAZOLAM 0.25 MG
0.25 TABLET ORAL
Qty: 0 | Refills: 0 | Status: DISCONTINUED | OUTPATIENT
Start: 2018-04-25 | End: 2018-04-26

## 2018-04-25 RX ORDER — ALPRAZOLAM 0.25 MG
1 TABLET ORAL
Qty: 60 | Refills: 0
Start: 2018-04-25 | End: 2018-05-24

## 2018-04-25 RX ORDER — ATORVASTATIN CALCIUM 80 MG/1
40 TABLET, FILM COATED ORAL AT BEDTIME
Qty: 0 | Refills: 0 | Status: DISCONTINUED | OUTPATIENT
Start: 2018-04-25 | End: 2018-04-25

## 2018-04-25 RX ADMIN — Medication 75 MICROGRAM(S): at 05:55

## 2018-04-25 RX ADMIN — SIMETHICONE 80 MILLIGRAM(S): 80 TABLET, CHEWABLE ORAL at 11:07

## 2018-04-25 RX ADMIN — Medication 100 MILLIGRAM(S): at 13:45

## 2018-04-25 RX ADMIN — ENOXAPARIN SODIUM 40 MILLIGRAM(S): 100 INJECTION SUBCUTANEOUS at 11:07

## 2018-04-25 RX ADMIN — BUDESONIDE AND FORMOTEROL FUMARATE DIHYDRATE 2 PUFF(S): 160; 4.5 AEROSOL RESPIRATORY (INHALATION) at 08:25

## 2018-04-25 RX ADMIN — SIMETHICONE 80 MILLIGRAM(S): 80 TABLET, CHEWABLE ORAL at 17:15

## 2018-04-25 RX ADMIN — Medication 0.25 MILLIGRAM(S): at 05:54

## 2018-04-25 RX ADMIN — Medication 2.5 MILLIGRAM(S): at 21:34

## 2018-04-25 RX ADMIN — BUDESONIDE AND FORMOTEROL FUMARATE DIHYDRATE 2 PUFF(S): 160; 4.5 AEROSOL RESPIRATORY (INHALATION) at 21:34

## 2018-04-25 RX ADMIN — Medication 40 MILLIGRAM(S): at 05:55

## 2018-04-25 RX ADMIN — Medication 2.5 MILLIGRAM(S): at 05:55

## 2018-04-25 RX ADMIN — SERTRALINE 25 MILLIGRAM(S): 25 TABLET, FILM COATED ORAL at 11:07

## 2018-04-25 RX ADMIN — Medication 180 MILLIGRAM(S): at 05:55

## 2018-04-25 RX ADMIN — Medication 0.25 MILLIGRAM(S): at 17:15

## 2018-04-25 RX ADMIN — TIOTROPIUM BROMIDE 1 CAPSULE(S): 18 CAPSULE ORAL; RESPIRATORY (INHALATION) at 08:25

## 2018-04-25 RX ADMIN — SIMETHICONE 80 MILLIGRAM(S): 80 TABLET, CHEWABLE ORAL at 06:00

## 2018-04-25 RX ADMIN — FAMOTIDINE 20 MILLIGRAM(S): 10 INJECTION INTRAVENOUS at 05:55

## 2018-04-25 RX ADMIN — SIMETHICONE 80 MILLIGRAM(S): 80 TABLET, CHEWABLE ORAL at 23:12

## 2018-04-25 RX ADMIN — Medication 2.5 MILLIGRAM(S): at 13:45

## 2018-04-25 RX ADMIN — Medication 0.25 MILLIGRAM(S): at 23:12

## 2018-04-25 RX ADMIN — Medication 100 MILLIGRAM(S): at 21:34

## 2018-04-25 RX ADMIN — Medication 100 MILLIGRAM(S): at 05:55

## 2018-04-25 RX ADMIN — FAMOTIDINE 20 MILLIGRAM(S): 10 INJECTION INTRAVENOUS at 17:15

## 2018-04-25 NOTE — PROGRESS NOTE ADULT - ASSESSMENT
76 y/o F with PMH of COPD on 1.5L O2 PRN (usually uses at night), CAD, MI (1991) s/p CABG, hypothyroidism 2/2 radioablation for hyperthyroidism (now on synthroid), AAA s/p repair, HTN, HLD, PVD s/p fem-pop bypass, GERD, and anxiety presented for shortness of breath.     #Dyspnea 2/2 Acute exacerbation of COPD (emphysema):  - continue tapering PO steroids  - Continue nebulizers, symbicort, and added atrovent  - No symptoms or signs of infection, CXR shows no effusion or focal consolidation. No need for antibiotics at this time.  - Pt no longer wheezing as was reported on admission  - Pt saturating 99% on baseline O2 of 1.5 to 2L NC. However, she only used to use O2 at night, but pt will need to wear when she is active during the day.  - Patient quit smoking 20-25 yrs ago after her CABG  - Given her h/o MI, CABG, and vascular disease, consulted Cardio (Dr. Hoang). He did not recommend any interventions at this time.  - PE ruled out on CT chest on admission    #Stomach knots, most likely IBS with constipation, ruled out mesenteric ischemia:  - Lactate 1.5  -  < from: CT Angio Abdomen and Pelvis w/ IV Cont (04.24.18 @ 00:55) >  Abdominal Aorta:    Celiac artery: There is approximately 40% stenosis of the ostium, stable.   Atherosclerosis of splenic and common hepatic artery with mild stenosis.  Superior mesenteric artery:  Stable findings of occluded proximal stent   (approximately 3.2 cm), with distal reconstitution by collaterals.  Inferior mesenteric artery: Chronic occlusion.  Renal arteries: Patent, single bilaterally  Common iliac arteries: stable and patent bilateral grafts  External iliac arteries: Stable and patent bilateral grafts  Internal iliac arteries: Diminutive and patent secondary to   reconstitution by collaterals. There are multiple collaterals in the   pelvis.    - No need for vascular consult as SMA occlusion stable  - Ordered LDL profile, pt not taking any statins - can follow this up outpatient  - Will also start trial of Reglan to help with GI dysmotility, as well as stool softeners and simethicone for gas. She can also continue MOM at night.    #Anxiety/Depression:  - Patient depressed about losing her  three years ago and still adjusting to living without him. Her daughter lives in her basement apartment and takes care of her.  - Consulted Psych: taper off xanax and start Zoloft 25mg daily. She agreed to follow up with psych outpatient    #Hypothyroidism:  - Continue levothyroxine  - Check TSH (may also contribute to depression if hypothyroid)    #Hypertension:  - Continue diltiazem    #DVT ppx with Lovenox subQ    #Code Status: Full Code  #Dispo: from home, she was medically stable today but pt would feel better going home tomorrow since she started new meds yesterday, anticipate d/c tomorrow.

## 2018-04-25 NOTE — PROGRESS NOTE ADULT - ASSESSMENT
Assessment and Plan:   Problem/Plan - 1:  ·  Problem: Anxiety.  Plan: start zoloft 25mg q24, explained again to pt that effects will not been seen until 4 weeks  xanax to 0.25mg q12;  outpt psych f/u. is needed    Problem/Plan - 2:  ·  Problem: Irritable bowel syndrome with constipation.  colace and reglan added  can cont MOM 15-30cc qhs as needed.     Problem/Plan - 3:  ·  Problem: Gas pain.  Plan: simethicone 80 q6 - not prn.     Problem/Plan - 4:  ·  Problem: Nausea.  Plan: trial of reglan 2.5 mg qac, might need to increase  use pepcid 20mg q12 (zantac at home).     Problem/Plan - 5:  ·  Problem: Chronic obstructive pulmonary disease, unspecified COPD type.  Plan: wear O2 24/7 - has chronic hypoxic resp failure  use symbicort and spiriva; albuterol for rescue  f/u pulm as outpt  change to pred 40mg and taper 10mg/day til off.    Problem/Plan - 6:  Problem: Peripheral vascular disease. Plan: HDL eval as outpt  need antiplt - use ASA 300mg supp 1/2 supp SC q24  vasc not needed for STABLE sma issue - cancel consult.    Problem/Plan - 7:  ·  Problem: Coronary artery disease involving native coronary artery of native heart without angina pectoris.  Plan: need ASA as above  no further w/u  outpt f/u w/ cardio  cont cardizem.     Problem/Plan - 8:  ·  Problem: Essential hypertension.  Plan: cardizem as ordered  BP OK.     Problem/Plan - 9:  ·  Problem: Acquired hypothyroidism.  Plan: cont synth  check TSH w/ PMD. Assessment and Plan:   Problem/Plan - 1:  ·  Problem: Anxiety.  Plan: start zoloft 25mg q24, explained again to pt that effects will not been seen until 4 weeks  xanax to 0.25mg q12;  outpt psych f/u. is needed    Problem/Plan - 2:  ·  Problem: Irritable bowel syndrome with constipation.  colace and reglan added  can cont MOM 15-30cc qhs as needed.     Problem/Plan - 3:  ·  Problem: Gas pain.  Plan: simethicone 80 q6 - not prn.     Problem/Plan - 4:  ·  Problem: Nausea.  Plan: trial of reglan 2.5 mg qac, might need to increase  use pepcid 20mg q12 (zantac at home).     Problem/Plan - 5:  ·  Problem: Chronic obstructive pulmonary disease, unspecified COPD type.  Plan: wear O2 24/7 - has chronic hypoxic resp failure  use symbicort and spiriva; albuterol for rescue  f/u pulm as outpt  change to pred 40mg and taper 10mg/day til off.    Problem/Plan - 6:  Problem: Peripheral vascular disease. Plan: HDL eval as outpt  need antiplt - use ASA 300mg supp 1/2 supp NV q24  vasc not needed for STABLE sma issue - cancel consult.    Problem/Plan - 7:  ·  Problem: Coronary artery disease involving native coronary artery of native heart without angina pectoris.  Plan: need ASA as above  no further w/u  outpt f/u w/ cardio  cont cardizem.     Problem/Plan - 8:  ·  Problem: Essential hypertension.  Plan: cardizem as ordered  BP OK.     Problem/Plan - 9:  ·  Problem: Acquired hypothyroidism.  Plan: cont synth  check TSH w/ PMD.       pt is medically stable for dc home with O2 and prednisone taper - to follow up with pulm, pmd,pysch  however pt refusing to leave today - discussed with HS to anticipate dc tomorrow   pt counselled - time spent 35 mins

## 2018-04-26 VITALS
HEART RATE: 69 BPM | RESPIRATION RATE: 19 BRPM | DIASTOLIC BLOOD PRESSURE: 67 MMHG | TEMPERATURE: 98 F | SYSTOLIC BLOOD PRESSURE: 160 MMHG

## 2018-04-26 LAB
ANION GAP SERPL CALC-SCNC: 8 MMOL/L — SIGNIFICANT CHANGE UP (ref 7–14)
BASOPHILS # BLD AUTO: 0.01 K/UL — SIGNIFICANT CHANGE UP (ref 0–0.2)
BASOPHILS NFR BLD AUTO: 0.1 % — SIGNIFICANT CHANGE UP (ref 0–1)
BUN SERPL-MCNC: 20 MG/DL — SIGNIFICANT CHANGE UP (ref 10–20)
CALCIUM SERPL-MCNC: 8.6 MG/DL — SIGNIFICANT CHANGE UP (ref 8.5–10.1)
CHLORIDE SERPL-SCNC: 98 MMOL/L — SIGNIFICANT CHANGE UP (ref 98–110)
CO2 SERPL-SCNC: 35 MMOL/L — HIGH (ref 17–32)
CREAT SERPL-MCNC: 0.5 MG/DL — LOW (ref 0.7–1.5)
EOSINOPHIL # BLD AUTO: 0.18 K/UL — SIGNIFICANT CHANGE UP (ref 0–0.7)
EOSINOPHIL NFR BLD AUTO: 2 % — SIGNIFICANT CHANGE UP (ref 0–8)
GLUCOSE SERPL-MCNC: 94 MG/DL — SIGNIFICANT CHANGE UP (ref 70–99)
HCT VFR BLD CALC: 35.7 % — LOW (ref 37–47)
HGB BLD-MCNC: 11.5 G/DL — LOW (ref 12–16)
IMM GRANULOCYTES NFR BLD AUTO: 0.3 % — SIGNIFICANT CHANGE UP (ref 0.1–0.3)
LYMPHOCYTES # BLD AUTO: 4.05 K/UL — HIGH (ref 1.2–3.4)
LYMPHOCYTES # BLD AUTO: 44.7 % — SIGNIFICANT CHANGE UP (ref 20.5–51.1)
MCHC RBC-ENTMCNC: 31.3 PG — HIGH (ref 27–31)
MCHC RBC-ENTMCNC: 32.2 G/DL — SIGNIFICANT CHANGE UP (ref 32–37)
MCV RBC AUTO: 97.3 FL — SIGNIFICANT CHANGE UP (ref 81–99)
MONOCYTES # BLD AUTO: 0.84 K/UL — HIGH (ref 0.1–0.6)
MONOCYTES NFR BLD AUTO: 9.3 % — SIGNIFICANT CHANGE UP (ref 1.7–9.3)
NEUTROPHILS # BLD AUTO: 3.96 K/UL — SIGNIFICANT CHANGE UP (ref 1.4–6.5)
NEUTROPHILS NFR BLD AUTO: 43.6 % — SIGNIFICANT CHANGE UP (ref 42.2–75.2)
NRBC # BLD: 0 /100 WBCS — SIGNIFICANT CHANGE UP (ref 0–0)
PLATELET # BLD AUTO: 236 K/UL — SIGNIFICANT CHANGE UP (ref 130–400)
POTASSIUM SERPL-MCNC: 4.5 MMOL/L — SIGNIFICANT CHANGE UP (ref 3.5–5)
POTASSIUM SERPL-SCNC: 4.5 MMOL/L — SIGNIFICANT CHANGE UP (ref 3.5–5)
RBC # BLD: 3.67 M/UL — LOW (ref 4.2–5.4)
RBC # FLD: 11.6 % — SIGNIFICANT CHANGE UP (ref 11.5–14.5)
SODIUM SERPL-SCNC: 141 MMOL/L — SIGNIFICANT CHANGE UP (ref 135–146)
WBC # BLD: 9.07 K/UL — SIGNIFICANT CHANGE UP (ref 4.8–10.8)
WBC # FLD AUTO: 9.07 K/UL — SIGNIFICANT CHANGE UP (ref 4.8–10.8)

## 2018-04-26 RX ORDER — ALBUTEROL 90 UG/1
2 AEROSOL, METERED ORAL
Qty: 0 | Refills: 0 | DISCHARGE
Start: 2018-04-26

## 2018-04-26 RX ORDER — ALBUTEROL 90 UG/1
2 AEROSOL, METERED ORAL
Qty: 0 | Refills: 0 | COMMUNITY

## 2018-04-26 RX ADMIN — Medication 0.25 MILLIGRAM(S): at 05:30

## 2018-04-26 RX ADMIN — SERTRALINE 25 MILLIGRAM(S): 25 TABLET, FILM COATED ORAL at 12:48

## 2018-04-26 RX ADMIN — SIMETHICONE 80 MILLIGRAM(S): 80 TABLET, CHEWABLE ORAL at 12:47

## 2018-04-26 RX ADMIN — Medication 2.5 MILLIGRAM(S): at 16:57

## 2018-04-26 RX ADMIN — Medication 180 MILLIGRAM(S): at 05:30

## 2018-04-26 RX ADMIN — FAMOTIDINE 20 MILLIGRAM(S): 10 INJECTION INTRAVENOUS at 16:57

## 2018-04-26 RX ADMIN — BUDESONIDE AND FORMOTEROL FUMARATE DIHYDRATE 2 PUFF(S): 160; 4.5 AEROSOL RESPIRATORY (INHALATION) at 07:42

## 2018-04-26 RX ADMIN — Medication 300 MILLIGRAM(S): at 16:57

## 2018-04-26 RX ADMIN — FAMOTIDINE 20 MILLIGRAM(S): 10 INJECTION INTRAVENOUS at 05:30

## 2018-04-26 RX ADMIN — Medication 2.5 MILLIGRAM(S): at 09:05

## 2018-04-26 RX ADMIN — SIMETHICONE 80 MILLIGRAM(S): 80 TABLET, CHEWABLE ORAL at 16:58

## 2018-04-26 RX ADMIN — ENOXAPARIN SODIUM 40 MILLIGRAM(S): 100 INJECTION SUBCUTANEOUS at 12:47

## 2018-04-26 RX ADMIN — Medication 40 MILLIGRAM(S): at 05:30

## 2018-04-26 RX ADMIN — Medication 75 MICROGRAM(S): at 05:30

## 2018-04-26 RX ADMIN — SIMETHICONE 80 MILLIGRAM(S): 80 TABLET, CHEWABLE ORAL at 05:30

## 2018-04-26 RX ADMIN — BUDESONIDE AND FORMOTEROL FUMARATE DIHYDRATE 2 PUFF(S): 160; 4.5 AEROSOL RESPIRATORY (INHALATION) at 09:07

## 2018-04-26 RX ADMIN — Medication 0.25 MILLIGRAM(S): at 16:57

## 2018-04-26 RX ADMIN — TIOTROPIUM BROMIDE 1 CAPSULE(S): 18 CAPSULE ORAL; RESPIRATORY (INHALATION) at 09:09

## 2018-04-26 RX ADMIN — Medication 100 MILLIGRAM(S): at 05:30

## 2018-04-26 RX ADMIN — Medication 100 MILLIGRAM(S): at 16:57

## 2018-04-26 NOTE — PROGRESS NOTE ADULT - PROBLEM SELECTOR PLAN 5
wear O2 24/7 - has chronic hypoxic resp failure  use symbicort 1 puff q12 and spiriva 1 puff q24; albuterol for rescue  f/u pulm as outpt  change to pred 40mg and taper 10mg/day til off - does not seem steroid dependent at this point

## 2018-04-26 NOTE — PROGRESS NOTE ADULT - PROBLEM SELECTOR PLAN 6
HDL eval as outpt  need antiplt - use ASA 300mg supp 1/2 supp OK q24  vasc not needed for STABLE sma issue - cancel consult

## 2018-04-26 NOTE — PROGRESS NOTE ADULT - ASSESSMENT
78 y/o F with PMH of COPD on 1.5L O2 PRN (usually uses at night), CAD, MI (1991) s/p CABG, hypothyroidism 2/2 radioablation for hyperthyroidism (now on synthroid), AAA s/p repair, HTN, HLD, PVD s/p fem-pop bypass, GERD, and anxiety presented for shortness of breath.     ASSESSMENT:    #Dyspnea 2/2 Acute exacerbation of COPD (emphysema). No signs of infection, CXR shows no effusion or focal consolidation. No need for antibiotics at this time.  #Stomach knots, most likely IBS with constipation, unlikely mesenteric ischemia SMA occlusion stable  #Anxiety/Depression  #Hypothyroidism:  #Hypertension:    PLAN:    - continue tapering PO steroids  - Continue nebulizers, symbicort, and added atrovent  - Ordered LDL profile, pt not taking any statins - can follow this up outpatient  - Will also start trial of Reglan to help with GI dysmotility, as well as stool softeners and simethicone for gas. She can also continue MOM at night.  - Consulted Psych: taper off xanax and start Zoloft 25mg daily. She agreed to follow up with psych outpatient  - Continue levothyroxine  - Check TSH (may also contribute to depression if hypothyroid)  - Continue diltiazem    #DVT ppx with Lovenox subQ  #Code Status: Full Code  #Dispo: from home, she was medically stable today but pt would feel better going home tomorrow since she started new meds yesterday, anticipate d/c tomorrow.

## 2018-04-26 NOTE — PROGRESS NOTE ADULT - PROBLEM SELECTOR PLAN 4
trial of reglan 2.5 mg qac, might need to increase to 5mg in a week  use pepcid 20mg q12 (zantac at home) - might need to increase zantac to 150mg q12

## 2018-04-26 NOTE — PROGRESS NOTE ADULT - SUBJECTIVE AND OBJECTIVE BOX
HOSPITALIST ATTENDING NOTE    KISHOR, JOAN  77y Female  [id number]    INTERVAL HPI/OVERNIGHT EVENTS:anxious -and does not want to go home balwinder    T(C): 36.1 (04-25-18 @ 05:16), Max: 36.7 (04-24-18 @ 21:07)  HR: 84 (04-25-18 @ 05:16) (75 - 84)  BP: 152/67 (04-25-18 @ 05:16) (124/58 - 152/67)  RR: 18 (04-25-18 @ 05:16) (18 - 18)  SpO2: 96% (04-24-18 @ 19:47) (96% - 96%)  Wt(kg): --      PHYSICAL EXAM:  GENERAL: NAD  HEAD:  Atraumatic, Normocephalic  EYES: EOMI, PERRLA, conjunctiva and sclera clear  ENMT: No tonsillar erythema, exudates, or enlargement  NECK: Supple, No JVD, Normal thyroid  NERVOUS SYSTEM:  Alert & Oriented X3, Good concentration; Non-focal- Motor Strength 5/5 B/L upper and lower extremities;  CHEST/LUNG: Clear to ascultation bilaterally; No rales, rhonchi, wheezing,   HEART: Regular rate and rhythm; No murmurs, rubs, or gallops  ABDOMEN: Soft, Nontender, Nondistended; Bowel sounds present  EXTREMITIES: No clubbing, cyanosis, or edema  LYMPH: No lymphadenopathy noted  SKIN: No rashes or lesions  PSYCH: No suicidal/homicial ideation/hallucinations    Consultant(s) Notes Reviewed:  [x ] YES  [ ] NO  Care Discussed with Consultants/Other Providers/ Housestaff [ x] YES  [ ] NO    LABS:                        11.8   9.48  )-----------( 263      ( 25 Apr 2018 07:30 )             35.7     04-25    142  |  99  |  16  ----------------------------<  93  4.4   |  35<H>  |  0.5<L>    Ca    8.9      25 Apr 2018 07:30    TPro  5.9<L>  /  Alb  3.7  /  TBili  0.4  /  DBili  <0.2  /  AST  38  /  ALT  62<H>  /  AlkPhos  55  04-25          RADIOLOGY & ADDITIONAL TESTS:    Imaging or report Personally Reviewed:  [ ] YES  [ ] NO    Case discussed with resident    Care discussed with pt/family      HEALTH ISSUES - PROBLEM Dx:  Acquired hypothyroidism: Acquired hypothyroidism  Essential hypertension: Essential hypertension  Coronary artery disease involving native coronary artery of native heart without angina pectoris: Coronary artery disease involving native coronary artery of native heart without angina pectoris  Peripheral vascular disease: Peripheral vascular disease  Chronic obstructive pulmonary disease, unspecified COPD type: Chronic obstructive pulmonary disease, unspecified COPD type  Nausea: Nausea  Gas pain: Gas pain  Irritable bowel syndrome with constipation: Irritable bowel syndrome with constipation  Anxiety: Anxiety
Patient is a 77y old  Female who presents with a chief complaint of Shortness of Breath (24 Apr 2018 09:54)   Patient seen and examined at bedside. She still complains of abdominal discomfort, especially after eating.    PAST MEDICAL & SURGICAL HISTORY:  History of myocardial infarction  Anxiety  Gastroesophageal reflux disease without esophagitis  Essential hypertension  Abdominal aortic aneurysm (AAA) without rupture  Chronic obstructive pulmonary disease with acute exacerbation  Peripheral vascular disease  Coronary artery disease involving native coronary artery of native heart without angina pectoris  Postablative hypothyroidism  Graves disease  MI (myocardial infarction)  S/P CABG (coronary artery bypass graft)  History of femoropopliteal bypass  After cataract, bilateral: R - 10/13  L - 8/14  H/O angioplasty: R femoral  H/O: hysterectomy: partial  History of cholecystectomy      MEDICATIONS  (STANDING):  ALBUTerol/ipratropium for Nebulization 3 milliLiter(s) Nebulizer every 6 hours  ALPRAZolam 0.5 milliGRAM(s) Oral two times a day  buDESOnide 160 MICROgram(s)/formoterol 4.5 MICROgram(s) Inhaler 2 Puff(s) Inhalation two times a day  dicyclomine 20 milliGRAM(s) Oral four times a day before meals  diltiazem    milliGRAM(s) Oral daily  enoxaparin Injectable 40 milliGRAM(s) SubCutaneous daily  levothyroxine 75 MICROGram(s) Oral daily  predniSONE   Tablet 60 milliGRAM(s) Oral daily  sertraline 25 milliGRAM(s) Oral daily  tiotropium 18 MICROgram(s) Capsule 1 Capsule(s) Inhalation daily    MEDICATIONS  (PRN):  ALBUTerol    90 MICROgram(s) HFA Inhaler 2 Puff(s) Inhalation every 6 hours PRN Shortness of Breath and/or Wheezing      Overnight events:    Vital Signs Last 24 Hrs  T(C): 36.6 (24 Apr 2018 14:22), Max: 36.6 (24 Apr 2018 14:22)  T(F): 97.9 (24 Apr 2018 14:22), Max: 97.9 (24 Apr 2018 14:22)  HR: 76 (24 Apr 2018 14:22) (66 - 76)  BP: 124/58 (24 Apr 2018 14:22) (124/58 - 169/76)  BP(mean): --  RR: 18 (24 Apr 2018 14:22) (17 - 18)  SpO2: 99% (24 Apr 2018 09:20) (96% - 99%)  CAPILLARY BLOOD GLUCOSE        I&O's Summary      Physical Exam:    GENERAL APPEARANCE:  alert and cooperative, and appears to be in no acute distress.  HEENT: Head Normocephalic/Atraumatic; PERRL  NECK: Neck supple, non-tender without lymphadenopathy  CARDIAC: RRR, Normal S1 and S2. No S3, S4 or murmurs  LUNGS: Clear to auscultation without rales, rhonchi or wheezing.  ABDOMEN: Positive bowel sounds. Soft, nondistended, nontender. No guarding or rebound. No HSM.  MUSCULOSKELETAL: No joint erythema or tenderness. Normal gait.  EXTREMITIES: No edema. Peripheral pulses intact.  NEUROLOGICAL: CN II-XII intact. Strength and sensation symmetric and intact throughout.      Labs:                        11.4   11.92 )-----------( 271      ( 24 Apr 2018 07:30 )             35.0             04-24    136  |  98  |  18  ----------------------------<  107<H>  4.6   |  28  |  0.6<L>    Ca    9.1      24 Apr 2018 07:30
CALROS IVERSON  77y  Female  ***My note supercedes ALL resident notes that I sign.  My corrections for their notes are in my note.***    PMD: Dr Ibarra  Note: This pt was admitted , but I was not assigned to see pt by my office until , thus no attending physician has seen pt until today.  Pt was incorrectly assigned to Dr. Ibarra at the [facility] by the ED.  This has now been corrected.  Nonetheless, the pt suffered no ill consequence from the above.    INTERVAL EVENTS: The pt has 4 issues:  1) Biggest problem by far is this knot-like feeling across her upper abdomen.  It never really goes away.  Stress makes it worse, particularly caring for her sister.  Other family stressors make it worse.  It is made better by relaxing.  Xanax also improves it as does cold food, like ice cream.  Pt has had this issue for over 10 years.  She has lost almost 60 lbs, but over the course of 5 years or more.  He   3 years ago and although she misses him, she seems to be coping well with this issue.  The pt has seen numerous doctors, including GI.  No particular dx made.  A problem that is fairly constant for 10 years w/out an organic dx after w/u is highly likely to be pyschological.  Furthermore, the wearing off time of the xanax also seems to drive the knot-like feeling (i.e., w/drawal symptom).  Pt agrees that she likely has a generalized anxiety disorder.  She also probably has OCD.  She does not appear to abnl bereavement or major depression.  She would like some help w/ anxiety and agrees to come off xanax.  Food tends to bother her, but she can eat an entire meal while she is out, but might feel abd cramps 20 or more minutes after eating.  Pt has had stenting of her SMA and she has good collateral flow.  I suspect her IBS is the cause along w/ anxiety. She is willing to f/u w/ psych as outpt.    2) IBS: pt has had this dx for many decades.  She is usually constipated and needs MOM every night to help her.  She has paul scoped.  Bentyl and librax do not help. She is willing to try reglan, because she also reports some nausea and the reglan might stimulate he intestines to move her bowels. Linzess is another possibility, but she can d/w GI.  She should be on colace to soften her stool. she also has a lot of gas and will take simethicone.    3) COPD - pt not using oxygen 24 hrs a day, which does not make sense.  As she drives her sister around she is not using a mobile unit of O2.  This is probably when she needs it most.  Nonetheless, she can speak quite well and in very full sentences at rest.  She does get winded when walking, but this is expected w/ an O2-dependent COPD pt w/ cardiac dz    4) CAD - CABG was in , last stenting was over 5 years ago, so revasc are old.  Last stress test about 1 year ago and recent echo were favorable.  Cardio leif says to cont same therapy.  My only concern is that asa or plavix PO cause her stomach upset.  Additionally, she has a b/l aorto/fem bypass and an SMA stent.  She needs some antiplatelet tx and is agreeable to aspirin supp.  Will cut the 300mg supp in half and use that amount.    Pt agrees that she does not really need the hospital for the above plan.  If stable, she'll likely go home tomorrow.    T(F): 97.9 (18 @ 14:22), Max: 97.9 (18 @ 14:22)  HR: 76 (-18 @ 14:22) (66 - 76)  BP: 124/58 (24-18 @ 14:22) (124/58 - 169/76)  RR: 18 (24-18 @ 14:22) (17 - 18)  SpO2: 99% (18 @ 09:20) (96% - 99%)    Physical Exam:  GENERAL APPEARANCE:  alert and cooperative, and appears to be in no acute distress.  HEENT:  PERRL; EOMI, throat clear  NECK: Neck supple, non-tender without lymphadenopathy; thyroid nl  CARDIAC: RRR, Normal S1 and S2. No murmurs  LUNGS: Clear to auscultation just decreased BS, no wheeze  ABDOMEN: Positive bowel sounds. Soft, nondistended, + tender in epigastric area - mild; No guarding or rebound. No HSM.  EXTREMITIES: No edema. no c/c  NEUROLOGICAL: CN II-XII intact. Strength and sensation symmetric and intact throughout.    LABS:                        11.4    (    97.5   11.92 )-----------( ---------      271      ( 2018 07:30 )             35.0    (    11.9   on steroids    136   (   98   (   107      18 @ 07:30  ----------------------               4.6   (   28   (   18                             -----                        0.6  Ca  9.1   Mg  --    P   --     LFT  6.8  (  0.4  (  19       18 @ 13:50  -------------------------  4.2  (  72  (  12    Lactate, Blood (18 @ 17:06)    Lactate, Blood: 1.5 mmol/L    Magnesium, Serum in AM (18 @ 07:22)    Magnesium, Serum: 2.1 mg/dL    CE neg    RADIOLOGY & ADDITIONAL TESTS:  < from: CT Angio Abdomen and Pelvis w/ IV Cont (18 @ 00:55) >  IMPRESSION:     1. No evidence of bowel wall thickening, pneumatosis, or portal venous   gas to suggest acute mesenteric ischemia.    2. Stable infrarenal abdominal aorto-biiliac bypass grafts with distal   anastomosis to bilateral common femoral arteries.    3.  Noted again, chronic occlusion of stented proximal SMA. Patent mid   and distal SMA, reconstituted by collaterals.    4.  Interval increase in size of left renal cystic lesion, 1.2 x 0.9 cm.   Suggest further evaluation by MRI with and without gadolinium.     < end of copied text >      < from: CT Chest w/ IV Cont (18 @ 17:17) >  IMPRESSION:     No central or lobar pulmonary embolism.    < end of copied text >      ALBUTerol    90 MICROgram(s) HFA Inhaler 2 Puff(s) Inhalation every 6 hours PRN  ALPRAZolam 0.25 milliGRAM(s) Oral two times a day  aspirin Suppository 300 milliGRAM(s) Rectal daily  buDESOnide 160 MICROgram(s)/formoterol 4.5 MICROgram(s) Inhaler 2 Puff(s) Inhalation two times a day  diltiazem    milliGRAM(s) Oral daily  enoxaparin Injectable 40 milliGRAM(s) SubCutaneous daily  famotidine    Tablet 20 milliGRAM(s) Oral two times a day  levothyroxine 75 MICROGram(s) Oral daily  metoclopramide 2.5 milliGRAM(s) Oral three times a day  predniSONE   Tablet 40 milliGRAM(s) Oral daily  sertraline 25 milliGRAM(s) Oral daily  tiotropium 18 MICROgram(s) Capsule 1 Capsule(s) Inhalation daily
KISHOR, JOAN  77y  Female  ***My note supercedes ALL resident notes that I sign.  My corrections for their notes are in my note.***    INTERVAL EVENTS: Pt doing about the same, perhaps better w/ breathing.  Pt walks fine. She is using O2. She feels more relaxed. Pt is able to eat. Pt feels well enough to go home and wants to go home.    T(F): 98.2 (04-26-18 @ 14:09), Max: 98.2 (04-26-18 @ 14:09)  HR: 69 (04-26-18 @ 14:09) (60 - 71)  BP: 160/67 (04-26-18 @ 14:09) (160/67 - 181/68)  RR: 19 (04-26-18 @ 14:09) (18 - 19)  SpO2: 96% (04-25-18 @ 19:55) (96% - 96%)    Physical Exam:  GENERAL APPEARANCE:  alert and cooperative, and appears to be in no acute distress.  HEENT:  PERRL; EOMI, throat clear  CARDIAC: RRR, Normal S1 and S2. No murmurs  LUNGS: Clear to auscultation just decreased BS, no wheeze  ABDOMEN: Positive bowel sounds. Soft, nondistended, + tender in epigastric area - mild; No guarding or rebound. No HSM.  EXTREMITIES: No edema. no c/c  NEUROLOGICAL: CN II-XII intact. Strength and sensation symmetric and intact throughout.    LABS:                        11.5    (    97.3   9.07  )-----------( ---------      236      ( 26 Apr 2018 06:44 )             35.7    (    11.6     141   (   98   (   94      04-26-18 @ 06:44  ----------------------               4.5   (   35   (   20                             -----                        0.5  Ca  8.6   Mg  --    P   --       PT/INR - ( 25 Apr 2018 07:30 )   PT: 10.30 sec;   INR: 0.95 ratio    PTT - ( 25 Apr 2018 07:30 )  PTT:26.4 sec    RADIOLOGY & ADDITIONAL TESTS:      ALBUTerol    90 MICROgram(s) HFA Inhaler 2 Puff(s) Inhalation every 6 hours PRN  ALPRAZolam 0.25 milliGRAM(s) Oral two times a day  aspirin Suppository 300 milliGRAM(s) Rectal daily  buDESOnide 160 MICROgram(s)/formoterol 4.5 MICROgram(s) Inhaler 2 Puff(s) Inhalation two times a day  diltiazem    milliGRAM(s) Oral daily  docusate sodium 100 milliGRAM(s) Oral three times a day  enoxaparin Injectable 40 milliGRAM(s) SubCutaneous daily  famotidine    Tablet 20 milliGRAM(s) Oral two times a day  levothyroxine 75 MICROGram(s) Oral daily  metoclopramide   Syrup 2.5 milliGRAM(s) Oral three times a day  predniSONE   Tablet 40 milliGRAM(s) Oral daily  sertraline 25 milliGRAM(s) Oral daily  simethicone 80 milliGRAM(s) Chew four times a day  tiotropium 18 MICROgram(s) Capsule 1 Capsule(s) Inhalation daily
Patient is a 77y old  Female who presents with a chief complaint of Shortness of Breath (21 Apr 2018 20:38)   Patient seen and examined at bedside. She complains of some anxiety/depression and dyspnea on exrtion.    PAST MEDICAL & SURGICAL HISTORY:  History of myocardial infarction  Anxiety  Gastroesophageal reflux disease without esophagitis  Essential hypertension  Abdominal aortic aneurysm (AAA) without rupture  Chronic obstructive pulmonary disease with acute exacerbation  Peripheral vascular disease  Coronary artery disease involving native coronary artery of native heart without angina pectoris  Postablative hypothyroidism  Graves disease  MI (myocardial infarction)  S/P CABG (coronary artery bypass graft)  History of femoropopliteal bypass  After cataract, bilateral: R - 10/13  L - 8/14  H/O angioplasty: R femoral  H/O: hysterectomy: partial  History of cholecystectomy      MEDICATIONS  (STANDING):  ALBUTerol/ipratropium for Nebulization 3 milliLiter(s) Nebulizer every 6 hours  ALPRAZolam 0.5 milliGRAM(s) Oral two times a day  buDESOnide 160 MICROgram(s)/formoterol 4.5 MICROgram(s) Inhaler 2 Puff(s) Inhalation two times a day  diltiazem    milliGRAM(s) Oral daily  enoxaparin Injectable 40 milliGRAM(s) SubCutaneous daily  levothyroxine 75 MICROGram(s) Oral daily  methylPREDNISolone sodium succinate Injectable 60 milliGRAM(s) IV Push every 12 hours  tiotropium 18 MICROgram(s) Capsule 1 Capsule(s) Inhalation daily    MEDICATIONS  (PRN):  ALBUTerol    90 MICROgram(s) HFA Inhaler 2 Puff(s) Inhalation every 6 hours PRN Shortness of Breath and/or Wheezing      Overnight events:    Vital Signs Last 24 Hrs  T(C): 36.3 (23 Apr 2018 13:34), Max: 36.3 (22 Apr 2018 21:28)  T(F): 97.4 (23 Apr 2018 13:34), Max: 97.4 (22 Apr 2018 21:28)  HR: 84 (23 Apr 2018 13:34) (68 - 84)  BP: 110/51 (23 Apr 2018 13:34) (110/51 - 165/72)  BP(mean): --  RR: 18 (23 Apr 2018 13:34) (18 - 20)  SpO2: 96% (23 Apr 2018 09:41) (96% - 96%)  CAPILLARY BLOOD GLUCOSE        I&O's Summary      Physical Exam:    GENERAL APPEARANCE:  alert and cooperative, and appears to be in no acute distress.  HEENT: Head Normocephalic/Atraumatic; PERRL  NECK: Neck supple, non-tender without lymphadenopathy, masses or thyromegaly.  CARDIAC: RRR, Normal S1 and S2. No S3, S4 or murmurs  LUNGS: Clear to auscultation without rales, rhonchi or wheezing.  ABDOMEN: Positive bowel sounds. Soft, nondistended, nontender. No guarding or rebound. No HSM.  MUSCULOSKELETAL: No joint erythema or tenderness. Normal gait.  EXTREMITIES: No edema. Peripheral pulses intact.   NEUROLOGICAL: CN II-XII intact. Strength and sensation symmetric and intact throughout.       Labs:                        12.2   5.56  )-----------( 314      ( 22 Apr 2018 07:22 )             37.3             04-22    137  |  96<L>  |  11  ----------------------------<  135<H>  4.6   |  31  |  0.6<L>    Ca    9.4      22 Apr 2018 07:22  Mg     2.1     04-22
Patient is a 77y old  Female who presents with a chief complaint of Shortness of Breath (24 Apr 2018 09:54)   Patient seen and examined at bedside. She has no acute complaints.    PAST MEDICAL & SURGICAL HISTORY:  History of myocardial infarction  Anxiety  Gastroesophageal reflux disease without esophagitis  Essential hypertension  Abdominal aortic aneurysm (AAA) without rupture  Chronic obstructive pulmonary disease with acute exacerbation  Peripheral vascular disease  Coronary artery disease involving native coronary artery of native heart without angina pectoris  Postablative hypothyroidism  Graves disease  MI (myocardial infarction)  S/P CABG (coronary artery bypass graft)  History of femoropopliteal bypass  After cataract, bilateral: R - 10/13  L - 8/14  H/O angioplasty: R femoral  H/O: hysterectomy: partial  History of cholecystectomy      MEDICATIONS  (STANDING):  ALPRAZolam 0.25 milliGRAM(s) Oral two times a day  aspirin Suppository 300 milliGRAM(s) Rectal daily  buDESOnide 160 MICROgram(s)/formoterol 4.5 MICROgram(s) Inhaler 2 Puff(s) Inhalation two times a day  diltiazem    milliGRAM(s) Oral daily  docusate sodium 100 milliGRAM(s) Oral three times a day  enoxaparin Injectable 40 milliGRAM(s) SubCutaneous daily  famotidine    Tablet 20 milliGRAM(s) Oral two times a day  levothyroxine 75 MICROGram(s) Oral daily  metoclopramide   Syrup 2.5 milliGRAM(s) Oral three times a day  predniSONE   Tablet 40 milliGRAM(s) Oral daily  sertraline 25 milliGRAM(s) Oral daily  simethicone 80 milliGRAM(s) Chew four times a day  tiotropium 18 MICROgram(s) Capsule 1 Capsule(s) Inhalation daily    MEDICATIONS  (PRN):  ALBUTerol    90 MICROgram(s) HFA Inhaler 2 Puff(s) Inhalation every 6 hours PRN Shortness of Breath and/or Wheezing      Overnight events:    Vital Signs Last 24 Hrs  T(C): 36.1 (25 Apr 2018 05:16), Max: 36.7 (24 Apr 2018 21:07)  T(F): 97 (25 Apr 2018 05:16), Max: 98.1 (24 Apr 2018 21:07)  HR: 84 (25 Apr 2018 05:16) (75 - 84)  BP: 152/67 (25 Apr 2018 05:16) (124/58 - 152/67)  BP(mean): --  RR: 18 (25 Apr 2018 05:16) (18 - 18)  SpO2: 96% (24 Apr 2018 19:47) (96% - 96%)  CAPILLARY BLOOD GLUCOSE        I&O's Summary      Physical Exam:    GENERAL APPEARANCE:  alert and cooperative, and appears to be in no acute distress.  HEENT: Head Normocephalic/Atraumatic; PERRL  NECK: Neck supple, non-tender without lymphadenopathy, masses or thyromegaly.  CARDIAC: RRR, Normal S1 and S2. No S3, S4 or murmurs  LUNGS: Clear to auscultation without rales, rhonchi or wheezing.  ABDOMEN: Positive bowel sounds. Soft, nondistended, nontender. No guarding or rebound. No HSM.  MUSCULOSKELETAL: No joint erythema or tenderness. Normal gait.  EXTREMITIES: No edema. Peripheral pulses intact.   NEUROLOGICAL: CN II-XII intact. Strength and sensation symmetric and intact throughout.    Labs:                        11.8   9.48  )-----------( 263      ( 25 Apr 2018 07:30 )             35.7             04-25    142  |  99  |  16  ----------------------------<  93  4.4   |  35<H>  |  0.5<L>    Ca    8.9      25 Apr 2018 07:30    TPro  5.9<L>  /  Alb  3.7  /  TBili  0.4  /  DBili  <0.2  /  AST  38  /  ALT  62<H>  /  AlkPhos  55  04-25    LIVER FUNCTIONS - ( 25 Apr 2018 07:30 )  Alb: 3.7 g/dL / Pro: 5.9 g/dL / ALK PHOS: 55 U/L / ALT: 62 U/L / AST: 38 U/L / GGT: x                 PT/INR - ( 25 Apr 2018 07:30 )   PT: 10.30 sec;   INR: 0.95 ratio         PTT - ( 25 Apr 2018 07:30 )  PTT:26.4 sec
SUBJECTIVE:    Patient is a 77y old Female who presents with a chief complaint of Shortness of Breath (24 Apr 2018 09:54)    Currently admitted to medicine with the primary diagnosis of COPD exacerbation.     Today is hospital day 5d. This morning she is resting comfortably in bed and reports no new issues or overnight events.     PAST MEDICAL & SURGICAL HISTORY  History of myocardial infarction  Anxiety  Gastroesophageal reflux disease without esophagitis  Essential hypertension  Abdominal aortic aneurysm (AAA) without rupture  Chronic obstructive pulmonary disease with acute exacerbation  Peripheral vascular disease  Coronary artery disease involving native coronary artery of native heart without angina pectoris  Postablative hypothyroidism  Graves disease  MI (myocardial infarction)  S/P CABG (coronary artery bypass graft)  History of femoropopliteal bypass  After cataract, bilateral: R - 10/13  L - 8/14  H/O angioplasty: R femoral  H/O: hysterectomy: partial  History of cholecystectomy    SOCIAL HISTORY:  Negative for smoking/alcohol/drug use.     ALLERGIES:  No Known Allergies    MEDICATIONS:  STANDING MEDICATIONS  ALPRAZolam 0.25 milliGRAM(s) Oral two times a day  aspirin Suppository 300 milliGRAM(s) Rectal daily  buDESOnide 160 MICROgram(s)/formoterol 4.5 MICROgram(s) Inhaler 2 Puff(s) Inhalation two times a day  diltiazem    milliGRAM(s) Oral daily  docusate sodium 100 milliGRAM(s) Oral three times a day  enoxaparin Injectable 40 milliGRAM(s) SubCutaneous daily  famotidine    Tablet 20 milliGRAM(s) Oral two times a day  levothyroxine 75 MICROGram(s) Oral daily  metoclopramide   Syrup 2.5 milliGRAM(s) Oral three times a day  predniSONE   Tablet 40 milliGRAM(s) Oral daily  sertraline 25 milliGRAM(s) Oral daily  simethicone 80 milliGRAM(s) Chew four times a day  tiotropium 18 MICROgram(s) Capsule 1 Capsule(s) Inhalation daily    PRN MEDICATIONS  ALBUTerol    90 MICROgram(s) HFA Inhaler 2 Puff(s) Inhalation every 6 hours PRN    VITALS:   T(F): 97.1  HR: 60  BP: 181/68  RR: 19  SpO2: 96%    LABS:                        11.5   9.07  )-----------( 236      ( 26 Apr 2018 06:44 )             35.7     04-26    141  |  98  |  20  ----------------------------<  94  4.5   |  35<H>  |  0.5<L>    Ca    8.6      26 Apr 2018 06:44    TPro  5.9<L>  /  Alb  3.7  /  TBili  0.4  /  DBili  <0.2  /  AST  38  /  ALT  62<H>  /  AlkPhos  55  04-25    PT/INR - ( 25 Apr 2018 07:30 )   PT: 10.30 sec;   INR: 0.95 ratio      PTT - ( 25 Apr 2018 07:30 )  PTT:26.4 sec    PHYSICAL EXAM:  GEN: No acute distress  LUNGS: Clear to auscultation bilaterally   HEART: S1/S2 present. RRR.   ABD: Soft, non-tender, non-distended. Bowel sounds present  EXT: NC/NC/NE/2+PP/GILL  NEURO: [id number]

## 2018-04-26 NOTE — PROGRESS NOTE ADULT - PROVIDER SPECIALTY LIST ADULT
Internal Medicine
Hospitalist
Internal Medicine

## 2018-04-26 NOTE — PROGRESS NOTE ADULT - PROBLEM SELECTOR PLAN 1
pt coping w/ 's loss well  might have OCD  start zoloft 25mg q24, which will likely need to be increased in 1 wk  begin to taper off xanax to 0.25mg q12; would avoid buspar too  outpt psych f/u

## 2018-05-08 DIAGNOSIS — Z90.49 ACQUIRED ABSENCE OF OTHER SPECIFIED PARTS OF DIGESTIVE TRACT: ICD-10-CM

## 2018-05-08 DIAGNOSIS — I10 ESSENTIAL (PRIMARY) HYPERTENSION: ICD-10-CM

## 2018-05-08 DIAGNOSIS — E78.5 HYPERLIPIDEMIA, UNSPECIFIED: ICD-10-CM

## 2018-05-08 DIAGNOSIS — F42.9 OBSESSIVE-COMPULSIVE DISORDER, UNSPECIFIED: ICD-10-CM

## 2018-05-08 DIAGNOSIS — K58.1 IRRITABLE BOWEL SYNDROME WITH CONSTIPATION: ICD-10-CM

## 2018-05-08 DIAGNOSIS — Z96.1 PRESENCE OF INTRAOCULAR LENS: ICD-10-CM

## 2018-05-08 DIAGNOSIS — J44.1 CHRONIC OBSTRUCTIVE PULMONARY DISEASE WITH (ACUTE) EXACERBATION: ICD-10-CM

## 2018-05-08 DIAGNOSIS — I73.9 PERIPHERAL VASCULAR DISEASE, UNSPECIFIED: ICD-10-CM

## 2018-05-08 DIAGNOSIS — E89.0 POSTPROCEDURAL HYPOTHYROIDISM: ICD-10-CM

## 2018-05-08 DIAGNOSIS — F41.1 GENERALIZED ANXIETY DISORDER: ICD-10-CM

## 2018-05-08 DIAGNOSIS — I25.2 OLD MYOCARDIAL INFARCTION: ICD-10-CM

## 2018-05-08 DIAGNOSIS — J96.11 CHRONIC RESPIRATORY FAILURE WITH HYPOXIA: ICD-10-CM

## 2018-05-08 DIAGNOSIS — Z79.52 LONG TERM (CURRENT) USE OF SYSTEMIC STEROIDS: ICD-10-CM

## 2018-05-08 DIAGNOSIS — Z87.891 PERSONAL HISTORY OF NICOTINE DEPENDENCE: ICD-10-CM

## 2018-05-08 DIAGNOSIS — I25.10 ATHEROSCLEROTIC HEART DISEASE OF NATIVE CORONARY ARTERY WITHOUT ANGINA PECTORIS: ICD-10-CM

## 2018-05-08 DIAGNOSIS — Z98.41 CATARACT EXTRACTION STATUS, RIGHT EYE: ICD-10-CM

## 2018-05-08 DIAGNOSIS — Z98.42 CATARACT EXTRACTION STATUS, LEFT EYE: ICD-10-CM

## 2018-05-08 DIAGNOSIS — Z95.1 PRESENCE OF AORTOCORONARY BYPASS GRAFT: ICD-10-CM

## 2018-05-08 DIAGNOSIS — Z90.710 ACQUIRED ABSENCE OF BOTH CERVIX AND UTERUS: ICD-10-CM

## 2018-05-08 DIAGNOSIS — K21.9 GASTRO-ESOPHAGEAL REFLUX DISEASE WITHOUT ESOPHAGITIS: ICD-10-CM

## 2018-05-08 DIAGNOSIS — F32.9 MAJOR DEPRESSIVE DISORDER, SINGLE EPISODE, UNSPECIFIED: ICD-10-CM

## 2018-08-13 PROBLEM — I71.4 ABDOMINAL AORTIC ANEURYSM, WITHOUT RUPTURE: Chronic | Status: ACTIVE | Noted: 2018-04-21

## 2018-08-13 PROBLEM — K21.9 GASTRO-ESOPHAGEAL REFLUX DISEASE WITHOUT ESOPHAGITIS: Chronic | Status: ACTIVE | Noted: 2018-04-21

## 2018-08-13 PROBLEM — E89.0 POSTPROCEDURAL HYPOTHYROIDISM: Chronic | Status: ACTIVE | Noted: 2018-04-21

## 2018-08-13 PROBLEM — J44.1 CHRONIC OBSTRUCTIVE PULMONARY DISEASE WITH (ACUTE) EXACERBATION: Chronic | Status: ACTIVE | Noted: 2018-04-21

## 2018-08-13 PROBLEM — I21.9 ACUTE MYOCARDIAL INFARCTION, UNSPECIFIED: Chronic | Status: ACTIVE | Noted: 2018-04-21

## 2018-08-13 PROBLEM — E05.00 THYROTOXICOSIS WITH DIFFUSE GOITER WITHOUT THYROTOXIC CRISIS OR STORM: Chronic | Status: ACTIVE | Noted: 2018-04-21

## 2018-08-13 PROBLEM — I25.10 ATHEROSCLEROTIC HEART DISEASE OF NATIVE CORONARY ARTERY WITHOUT ANGINA PECTORIS: Chronic | Status: ACTIVE | Noted: 2018-04-21

## 2018-08-13 PROBLEM — I10 ESSENTIAL (PRIMARY) HYPERTENSION: Chronic | Status: ACTIVE | Noted: 2018-04-21

## 2018-08-13 PROBLEM — I73.9 PERIPHERAL VASCULAR DISEASE, UNSPECIFIED: Chronic | Status: ACTIVE | Noted: 2018-04-21

## 2018-08-13 PROBLEM — I25.2 OLD MYOCARDIAL INFARCTION: Chronic | Status: ACTIVE | Noted: 2018-04-21

## 2018-08-13 PROBLEM — F41.9 ANXIETY DISORDER, UNSPECIFIED: Chronic | Status: ACTIVE | Noted: 2018-04-21

## 2019-01-01 ENCOUNTER — OUTPATIENT (OUTPATIENT)
Dept: OUTPATIENT SERVICES | Facility: HOSPITAL | Age: 79
LOS: 1 days | Discharge: HOME | End: 2019-01-01

## 2019-01-01 ENCOUNTER — OUTPATIENT (OUTPATIENT)
Dept: OUTPATIENT SERVICES | Facility: HOSPITAL | Age: 79
LOS: 1 days | Discharge: HOME | End: 2019-01-01
Payer: MEDICARE

## 2019-01-01 ENCOUNTER — INPATIENT (INPATIENT)
Facility: HOSPITAL | Age: 79
LOS: 1 days | End: 2019-09-21
Attending: INTERNAL MEDICINE | Admitting: INTERNAL MEDICINE
Payer: MEDICARE

## 2019-01-01 ENCOUNTER — INPATIENT (INPATIENT)
Facility: HOSPITAL | Age: 79
LOS: 7 days | Discharge: HOME | End: 2019-09-06
Attending: INTERNAL MEDICINE | Admitting: INTERNAL MEDICINE
Payer: MEDICARE

## 2019-01-01 VITALS
RESPIRATION RATE: 22 BRPM | OXYGEN SATURATION: 98 % | HEART RATE: 97 BPM | DIASTOLIC BLOOD PRESSURE: 68 MMHG | SYSTOLIC BLOOD PRESSURE: 140 MMHG

## 2019-01-01 VITALS — HEART RATE: 74 BPM | SYSTOLIC BLOOD PRESSURE: 140 MMHG | RESPIRATION RATE: 18 BRPM | DIASTOLIC BLOOD PRESSURE: 60 MMHG

## 2019-01-01 VITALS — HEART RATE: 96 BPM | OXYGEN SATURATION: 95 %

## 2019-01-01 VITALS — OXYGEN SATURATION: 99 %

## 2019-01-01 DIAGNOSIS — Z95.1 PRESENCE OF AORTOCORONARY BYPASS GRAFT: Chronic | ICD-10-CM

## 2019-01-01 DIAGNOSIS — Z95.5 PRESENCE OF CORONARY ANGIOPLASTY IMPLANT AND GRAFT: ICD-10-CM

## 2019-01-01 DIAGNOSIS — Z98.890 OTHER SPECIFIED POSTPROCEDURAL STATES: Chronic | ICD-10-CM

## 2019-01-01 DIAGNOSIS — H26.40 UNSPECIFIED SECONDARY CATARACT: Chronic | ICD-10-CM

## 2019-01-01 DIAGNOSIS — I10 ESSENTIAL (PRIMARY) HYPERTENSION: ICD-10-CM

## 2019-01-01 DIAGNOSIS — R09.89 OTHER SPECIFIED SYMPTOMS AND SIGNS INVOLVING THE CIRCULATORY AND RESPIRATORY SYSTEMS: ICD-10-CM

## 2019-01-01 DIAGNOSIS — E78.5 HYPERLIPIDEMIA, UNSPECIFIED: ICD-10-CM

## 2019-01-01 DIAGNOSIS — K71.9 TOXIC LIVER DISEASE, UNSPECIFIED: ICD-10-CM

## 2019-01-01 DIAGNOSIS — Z95.810 PRESENCE OF AUTOMATIC (IMPLANTABLE) CARDIAC DEFIBRILLATOR: ICD-10-CM

## 2019-01-01 DIAGNOSIS — E55.9 VITAMIN D DEFICIENCY, UNSPECIFIED: ICD-10-CM

## 2019-01-01 DIAGNOSIS — I47.1 SUPRAVENTRICULAR TACHYCARDIA: ICD-10-CM

## 2019-01-01 DIAGNOSIS — Z95.820 PERIPHERAL VASCULAR ANGIOPLASTY STATUS WITH IMPLANTS AND GRAFTS: ICD-10-CM

## 2019-01-01 DIAGNOSIS — Z90.49 ACQUIRED ABSENCE OF OTHER SPECIFIED PARTS OF DIGESTIVE TRACT: ICD-10-CM

## 2019-01-01 DIAGNOSIS — I50.9 HEART FAILURE, UNSPECIFIED: ICD-10-CM

## 2019-01-01 DIAGNOSIS — E87.5 HYPERKALEMIA: ICD-10-CM

## 2019-01-01 DIAGNOSIS — Z98.62 PERIPHERAL VASCULAR ANGIOPLASTY STATUS: Chronic | ICD-10-CM

## 2019-01-01 DIAGNOSIS — Z99.81 DEPENDENCE ON SUPPLEMENTAL OXYGEN: ICD-10-CM

## 2019-01-01 DIAGNOSIS — I49.9 CARDIAC ARRHYTHMIA, UNSPECIFIED: ICD-10-CM

## 2019-01-01 DIAGNOSIS — Z87.891 PERSONAL HISTORY OF NICOTINE DEPENDENCE: ICD-10-CM

## 2019-01-01 DIAGNOSIS — R06.00 DYSPNEA, UNSPECIFIED: ICD-10-CM

## 2019-01-01 DIAGNOSIS — R91.1 SOLITARY PULMONARY NODULE: ICD-10-CM

## 2019-01-01 DIAGNOSIS — K55.1 CHRONIC VASCULAR DISORDERS OF INTESTINE: ICD-10-CM

## 2019-01-01 DIAGNOSIS — Z90.711 ACQUIRED ABSENCE OF UTERUS WITH REMAINING CERVICAL STUMP: ICD-10-CM

## 2019-01-01 DIAGNOSIS — K21.9 GASTRO-ESOPHAGEAL REFLUX DISEASE WITHOUT ESOPHAGITIS: ICD-10-CM

## 2019-01-01 DIAGNOSIS — J96.22 ACUTE AND CHRONIC RESPIRATORY FAILURE WITH HYPERCAPNIA: ICD-10-CM

## 2019-01-01 DIAGNOSIS — D75.89 OTHER SPECIFIED DISEASES OF BLOOD AND BLOOD-FORMING ORGANS: ICD-10-CM

## 2019-01-01 DIAGNOSIS — J44.1 CHRONIC OBSTRUCTIVE PULMONARY DISEASE WITH (ACUTE) EXACERBATION: ICD-10-CM

## 2019-01-01 DIAGNOSIS — E03.9 HYPOTHYROIDISM, UNSPECIFIED: ICD-10-CM

## 2019-01-01 DIAGNOSIS — I73.9 PERIPHERAL VASCULAR DISEASE, UNSPECIFIED: ICD-10-CM

## 2019-01-01 DIAGNOSIS — K59.00 CONSTIPATION, UNSPECIFIED: ICD-10-CM

## 2019-01-01 DIAGNOSIS — R74.0 NONSPECIFIC ELEVATION OF LEVELS OF TRANSAMINASE AND LACTIC ACID DEHYDROGENASE [LDH]: ICD-10-CM

## 2019-01-01 DIAGNOSIS — I21.4 NON-ST ELEVATION (NSTEMI) MYOCARDIAL INFARCTION: ICD-10-CM

## 2019-01-01 DIAGNOSIS — F41.9 ANXIETY DISORDER, UNSPECIFIED: ICD-10-CM

## 2019-01-01 DIAGNOSIS — I25.10 ATHEROSCLEROTIC HEART DISEASE OF NATIVE CORONARY ARTERY WITHOUT ANGINA PECTORIS: ICD-10-CM

## 2019-01-01 DIAGNOSIS — I11.0 HYPERTENSIVE HEART DISEASE WITH HEART FAILURE: ICD-10-CM

## 2019-01-01 LAB
ALBUMIN SERPL ELPH-MCNC: 3.3 G/DL — LOW (ref 3.5–5.2)
ALBUMIN SERPL ELPH-MCNC: 3.4 G/DL — LOW (ref 3.5–5.2)
ALBUMIN SERPL ELPH-MCNC: 3.6 G/DL — SIGNIFICANT CHANGE UP (ref 3.5–5.2)
ALBUMIN SERPL ELPH-MCNC: 3.9 G/DL — SIGNIFICANT CHANGE UP (ref 3.5–5.2)
ALBUMIN SERPL ELPH-MCNC: 4.2 G/DL — SIGNIFICANT CHANGE UP (ref 3.5–5.2)
ALBUMIN SERPL ELPH-MCNC: 4.5 G/DL — SIGNIFICANT CHANGE UP (ref 3.5–5.2)
ALBUMIN SERPL ELPH-MCNC: 4.8 G/DL — SIGNIFICANT CHANGE UP (ref 3.5–5.2)
ALBUMIN SERPL ELPH-MCNC: 4.9 G/DL — SIGNIFICANT CHANGE UP (ref 3.5–5.2)
ALBUMIN SERPL ELPH-MCNC: 5.1 G/DL — SIGNIFICANT CHANGE UP (ref 3.5–5.2)
ALBUMIN SERPL ELPH-MCNC: 5.2 G/DL — SIGNIFICANT CHANGE UP (ref 3.5–5.2)
ALP SERPL-CCNC: 48 U/L — SIGNIFICANT CHANGE UP (ref 30–115)
ALP SERPL-CCNC: 51 U/L — SIGNIFICANT CHANGE UP (ref 30–115)
ALP SERPL-CCNC: 52 U/L — SIGNIFICANT CHANGE UP (ref 30–115)
ALP SERPL-CCNC: 53 U/L — SIGNIFICANT CHANGE UP (ref 30–115)
ALP SERPL-CCNC: 55 U/L — SIGNIFICANT CHANGE UP (ref 30–115)
ALP SERPL-CCNC: 58 U/L — SIGNIFICANT CHANGE UP (ref 30–115)
ALP SERPL-CCNC: 63 U/L — SIGNIFICANT CHANGE UP (ref 30–115)
ALP SERPL-CCNC: 64 U/L — SIGNIFICANT CHANGE UP (ref 30–115)
ALP SERPL-CCNC: 70 U/L — SIGNIFICANT CHANGE UP (ref 30–115)
ALP SERPL-CCNC: 81 U/L — SIGNIFICANT CHANGE UP (ref 30–115)
ALP SERPL-CCNC: 88 U/L — SIGNIFICANT CHANGE UP (ref 30–115)
ALP SERPL-CCNC: 96 U/L — SIGNIFICANT CHANGE UP (ref 30–115)
ALT FLD-CCNC: 115 U/L — HIGH (ref 0–41)
ALT FLD-CCNC: 13 U/L — SIGNIFICANT CHANGE UP (ref 0–41)
ALT FLD-CCNC: 140 U/L — HIGH (ref 0–41)
ALT FLD-CCNC: 159 U/L — HIGH (ref 0–41)
ALT FLD-CCNC: 20 U/L — SIGNIFICANT CHANGE UP (ref 0–41)
ALT FLD-CCNC: 21 U/L — SIGNIFICANT CHANGE UP (ref 0–41)
ALT FLD-CCNC: 46 U/L — HIGH (ref 0–41)
ALT FLD-CCNC: 60 U/L — HIGH (ref 0–41)
ALT FLD-CCNC: 78 U/L — HIGH (ref 0–41)
ALT FLD-CCNC: 83 U/L — HIGH (ref 0–41)
ALT FLD-CCNC: 85 U/L — HIGH (ref 0–41)
ALT FLD-CCNC: 94 U/L — HIGH (ref 0–41)
ANION GAP SERPL CALC-SCNC: 10 MMOL/L — SIGNIFICANT CHANGE UP (ref 7–14)
ANION GAP SERPL CALC-SCNC: 10 MMOL/L — SIGNIFICANT CHANGE UP (ref 7–14)
ANION GAP SERPL CALC-SCNC: 12 MMOL/L — SIGNIFICANT CHANGE UP (ref 7–14)
ANION GAP SERPL CALC-SCNC: 13 MMOL/L — SIGNIFICANT CHANGE UP (ref 7–14)
ANION GAP SERPL CALC-SCNC: 15 MMOL/L — HIGH (ref 7–14)
ANION GAP SERPL CALC-SCNC: 16 MMOL/L — HIGH (ref 7–14)
ANION GAP SERPL CALC-SCNC: 16 MMOL/L — HIGH (ref 7–14)
ANION GAP SERPL CALC-SCNC: 8 MMOL/L — SIGNIFICANT CHANGE UP (ref 7–14)
ANION GAP SERPL CALC-SCNC: 9 MMOL/L — SIGNIFICANT CHANGE UP (ref 7–14)
APPEARANCE UR: CLEAR — SIGNIFICANT CHANGE UP
APTT BLD: 37.9 SEC — SIGNIFICANT CHANGE UP (ref 27–39.2)
AST SERPL-CCNC: 185 U/L — HIGH (ref 0–41)
AST SERPL-CCNC: 20 U/L — SIGNIFICANT CHANGE UP (ref 0–41)
AST SERPL-CCNC: 28 U/L — SIGNIFICANT CHANGE UP (ref 0–41)
AST SERPL-CCNC: 31 U/L — SIGNIFICANT CHANGE UP (ref 0–41)
AST SERPL-CCNC: 40 U/L — SIGNIFICANT CHANGE UP (ref 0–41)
AST SERPL-CCNC: 42 U/L — HIGH (ref 0–41)
AST SERPL-CCNC: 43 U/L — HIGH (ref 0–41)
AST SERPL-CCNC: 46 U/L — HIGH (ref 0–41)
AST SERPL-CCNC: 66 U/L — HIGH (ref 0–41)
AST SERPL-CCNC: 75 U/L — HIGH (ref 0–41)
AST SERPL-CCNC: 82 U/L — HIGH (ref 0–41)
AST SERPL-CCNC: 88 U/L — HIGH (ref 0–41)
BASE EXCESS BLDA CALC-SCNC: 3.7 MMOL/L — HIGH (ref -2–2)
BASE EXCESS BLDA CALC-SCNC: 8.3 MMOL/L — HIGH (ref -2–2)
BASE EXCESS BLDV CALC-SCNC: -0.8 MMOL/L — SIGNIFICANT CHANGE UP (ref -2–2)
BASE EXCESS BLDV CALC-SCNC: 7.6 MMOL/L — HIGH (ref -2–2)
BASOPHILS # BLD AUTO: 0.01 K/UL — SIGNIFICANT CHANGE UP (ref 0–0.2)
BASOPHILS # BLD AUTO: 0.01 K/UL — SIGNIFICANT CHANGE UP (ref 0–0.2)
BASOPHILS # BLD AUTO: 0.02 K/UL — SIGNIFICANT CHANGE UP (ref 0–0.2)
BASOPHILS # BLD AUTO: 0.02 K/UL — SIGNIFICANT CHANGE UP (ref 0–0.2)
BASOPHILS # BLD AUTO: 0.03 K/UL — SIGNIFICANT CHANGE UP (ref 0–0.2)
BASOPHILS # BLD AUTO: 0.04 K/UL — SIGNIFICANT CHANGE UP (ref 0–0.2)
BASOPHILS # BLD AUTO: 0.05 K/UL — SIGNIFICANT CHANGE UP (ref 0–0.2)
BASOPHILS # BLD AUTO: 0.09 K/UL — SIGNIFICANT CHANGE UP (ref 0–0.2)
BASOPHILS NFR BLD AUTO: 0.1 % — SIGNIFICANT CHANGE UP (ref 0–1)
BASOPHILS NFR BLD AUTO: 0.2 % — SIGNIFICANT CHANGE UP (ref 0–1)
BASOPHILS NFR BLD AUTO: 0.5 % — SIGNIFICANT CHANGE UP (ref 0–1)
BILIRUB SERPL-MCNC: 0.4 MG/DL — SIGNIFICANT CHANGE UP (ref 0.2–1.2)
BILIRUB SERPL-MCNC: 0.5 MG/DL — SIGNIFICANT CHANGE UP (ref 0.2–1.2)
BILIRUB SERPL-MCNC: 0.6 MG/DL — SIGNIFICANT CHANGE UP (ref 0.2–1.2)
BILIRUB SERPL-MCNC: 0.7 MG/DL — SIGNIFICANT CHANGE UP (ref 0.2–1.2)
BILIRUB SERPL-MCNC: 0.8 MG/DL — SIGNIFICANT CHANGE UP (ref 0.2–1.2)
BILIRUB SERPL-MCNC: 0.8 MG/DL — SIGNIFICANT CHANGE UP (ref 0.2–1.2)
BILIRUB SERPL-MCNC: 0.9 MG/DL — SIGNIFICANT CHANGE UP (ref 0.2–1.2)
BILIRUB UR-MCNC: NEGATIVE — SIGNIFICANT CHANGE UP
BUN SERPL-MCNC: 15 MG/DL — SIGNIFICANT CHANGE UP (ref 10–20)
BUN SERPL-MCNC: 15 MG/DL — SIGNIFICANT CHANGE UP (ref 10–20)
BUN SERPL-MCNC: 16 MG/DL — SIGNIFICANT CHANGE UP (ref 10–20)
BUN SERPL-MCNC: 17 MG/DL — SIGNIFICANT CHANGE UP (ref 10–20)
BUN SERPL-MCNC: 20 MG/DL — SIGNIFICANT CHANGE UP (ref 10–20)
BUN SERPL-MCNC: 23 MG/DL — HIGH (ref 10–20)
BUN SERPL-MCNC: 24 MG/DL — HIGH (ref 10–20)
BUN SERPL-MCNC: 25 MG/DL — HIGH (ref 10–20)
BUN SERPL-MCNC: 25 MG/DL — HIGH (ref 10–20)
BUN SERPL-MCNC: 27 MG/DL — HIGH (ref 10–20)
BUN SERPL-MCNC: 29 MG/DL — HIGH (ref 10–20)
BUN SERPL-MCNC: 30 MG/DL — HIGH (ref 10–20)
BUN SERPL-MCNC: 34 MG/DL — HIGH (ref 10–20)
BURR CELLS BLD QL SMEAR: SIGNIFICANT CHANGE UP
CA-I SERPL-SCNC: 1.24 MMOL/L — SIGNIFICANT CHANGE UP (ref 1.12–1.3)
CA-I SERPL-SCNC: 1.32 MMOL/L — HIGH (ref 1.12–1.3)
CALCIUM SERPL-MCNC: 10 MG/DL — SIGNIFICANT CHANGE UP (ref 8.5–10.1)
CALCIUM SERPL-MCNC: 10.4 MG/DL — HIGH (ref 8.5–10.1)
CALCIUM SERPL-MCNC: 8.1 MG/DL — LOW (ref 8.5–10.1)
CALCIUM SERPL-MCNC: 8.2 MG/DL — LOW (ref 8.5–10.1)
CALCIUM SERPL-MCNC: 8.2 MG/DL — LOW (ref 8.5–10.1)
CALCIUM SERPL-MCNC: 8.6 MG/DL — SIGNIFICANT CHANGE UP (ref 8.5–10.1)
CALCIUM SERPL-MCNC: 9.1 MG/DL — SIGNIFICANT CHANGE UP (ref 8.5–10.1)
CALCIUM SERPL-MCNC: 9.2 MG/DL — SIGNIFICANT CHANGE UP (ref 8.5–10.1)
CALCIUM SERPL-MCNC: 9.3 MG/DL — SIGNIFICANT CHANGE UP (ref 8.5–10.1)
CALCIUM SERPL-MCNC: 9.4 MG/DL — SIGNIFICANT CHANGE UP (ref 8.5–10.1)
CALCIUM SERPL-MCNC: 9.6 MG/DL — SIGNIFICANT CHANGE UP (ref 8.5–10.1)
CALCIUM SERPL-MCNC: 9.6 MG/DL — SIGNIFICANT CHANGE UP (ref 8.5–10.1)
CALCIUM SERPL-MCNC: 9.7 MG/DL — SIGNIFICANT CHANGE UP (ref 8.5–10.1)
CALCIUM SERPL-MCNC: 9.8 MG/DL — SIGNIFICANT CHANGE UP (ref 8.5–10.1)
CALCIUM SERPL-MCNC: 9.9 MG/DL — SIGNIFICANT CHANGE UP (ref 8.5–10.1)
CHLORIDE SERPL-SCNC: 102 MMOL/L — SIGNIFICANT CHANGE UP (ref 98–110)
CHLORIDE SERPL-SCNC: 103 MMOL/L — SIGNIFICANT CHANGE UP (ref 98–110)
CHLORIDE SERPL-SCNC: 88 MMOL/L — LOW (ref 98–110)
CHLORIDE SERPL-SCNC: 90 MMOL/L — LOW (ref 98–110)
CHLORIDE SERPL-SCNC: 92 MMOL/L — LOW (ref 98–110)
CHLORIDE SERPL-SCNC: 93 MMOL/L — LOW (ref 98–110)
CHLORIDE SERPL-SCNC: 96 MMOL/L — LOW (ref 98–110)
CHLORIDE SERPL-SCNC: 96 MMOL/L — LOW (ref 98–110)
CHLORIDE SERPL-SCNC: 97 MMOL/L — LOW (ref 98–110)
CHLORIDE SERPL-SCNC: 98 MMOL/L — SIGNIFICANT CHANGE UP (ref 98–110)
CHLORIDE SERPL-SCNC: 99 MMOL/L — SIGNIFICANT CHANGE UP (ref 98–110)
CHLORIDE SERPL-SCNC: 99 MMOL/L — SIGNIFICANT CHANGE UP (ref 98–110)
CK MB CFR SERPL CALC: 11.1 NG/ML — HIGH (ref 0.6–6.3)
CK MB CFR SERPL CALC: 15.9 NG/ML — HIGH (ref 0.6–6.3)
CK MB CFR SERPL CALC: 18.8 NG/ML — HIGH (ref 0.6–6.3)
CK MB CFR SERPL CALC: 21.4 NG/ML — HIGH (ref 0.6–6.3)
CK SERPL-CCNC: 145 U/L — SIGNIFICANT CHANGE UP (ref 0–225)
CK SERPL-CCNC: 160 U/L — SIGNIFICANT CHANGE UP (ref 0–225)
CK SERPL-CCNC: 184 U/L — SIGNIFICANT CHANGE UP (ref 0–225)
CK SERPL-CCNC: 430 U/L — HIGH (ref 0–225)
CO2 SERPL-SCNC: 23 MMOL/L — SIGNIFICANT CHANGE UP (ref 17–32)
CO2 SERPL-SCNC: 23 MMOL/L — SIGNIFICANT CHANGE UP (ref 17–32)
CO2 SERPL-SCNC: 24 MMOL/L — SIGNIFICANT CHANGE UP (ref 17–32)
CO2 SERPL-SCNC: 25 MMOL/L — SIGNIFICANT CHANGE UP (ref 17–32)
CO2 SERPL-SCNC: 25 MMOL/L — SIGNIFICANT CHANGE UP (ref 17–32)
CO2 SERPL-SCNC: 28 MMOL/L — SIGNIFICANT CHANGE UP (ref 17–32)
CO2 SERPL-SCNC: 31 MMOL/L — SIGNIFICANT CHANGE UP (ref 17–32)
CO2 SERPL-SCNC: 32 MMOL/L — SIGNIFICANT CHANGE UP (ref 17–32)
CO2 SERPL-SCNC: 33 MMOL/L — HIGH (ref 17–32)
CO2 SERPL-SCNC: 35 MMOL/L — HIGH (ref 17–32)
CO2 SERPL-SCNC: 36 MMOL/L — HIGH (ref 17–32)
CO2 SERPL-SCNC: 38 MMOL/L — HIGH (ref 17–32)
CO2 SERPL-SCNC: 39 MMOL/L — HIGH (ref 17–32)
COLOR SPEC: YELLOW — SIGNIFICANT CHANGE UP
CREAT SERPL-MCNC: 0.6 MG/DL — LOW (ref 0.7–1.5)
CREAT SERPL-MCNC: 0.7 MG/DL — SIGNIFICANT CHANGE UP (ref 0.7–1.5)
CREAT SERPL-MCNC: 0.8 MG/DL — SIGNIFICANT CHANGE UP (ref 0.7–1.5)
CREAT SERPL-MCNC: 0.8 MG/DL — SIGNIFICANT CHANGE UP (ref 0.7–1.5)
CREAT SERPL-MCNC: 0.9 MG/DL — SIGNIFICANT CHANGE UP (ref 0.7–1.5)
CULTURE RESULTS: NO GROWTH — SIGNIFICANT CHANGE UP
CULTURE RESULTS: SIGNIFICANT CHANGE UP
CULTURE RESULTS: SIGNIFICANT CHANGE UP
D DIMER BLD IA.RAPID-MCNC: 833 NG/ML DDU — HIGH (ref 0–230)
DIFF PNL FLD: NEGATIVE — SIGNIFICANT CHANGE UP
EOSINOPHIL # BLD AUTO: 0 K/UL — SIGNIFICANT CHANGE UP (ref 0–0.7)
EOSINOPHIL # BLD AUTO: 0.01 K/UL — SIGNIFICANT CHANGE UP (ref 0–0.7)
EOSINOPHIL # BLD AUTO: 0.02 K/UL — SIGNIFICANT CHANGE UP (ref 0–0.7)
EOSINOPHIL # BLD AUTO: 0.15 K/UL — SIGNIFICANT CHANGE UP (ref 0–0.7)
EOSINOPHIL # BLD AUTO: 0.43 K/UL — SIGNIFICANT CHANGE UP (ref 0–0.7)
EOSINOPHIL NFR BLD AUTO: 0 % — SIGNIFICANT CHANGE UP (ref 0–8)
EOSINOPHIL NFR BLD AUTO: 0.1 % — SIGNIFICANT CHANGE UP (ref 0–8)
EOSINOPHIL NFR BLD AUTO: 1 % — SIGNIFICANT CHANGE UP (ref 0–8)
EOSINOPHIL NFR BLD AUTO: 2.3 % — SIGNIFICANT CHANGE UP (ref 0–8)
ESTIMATED AVERAGE GLUCOSE: 108 MG/DL — SIGNIFICANT CHANGE UP (ref 68–114)
GAS PNL BLDV: 133 MMOL/L — LOW (ref 136–145)
GAS PNL BLDV: 143 MMOL/L — SIGNIFICANT CHANGE UP (ref 136–145)
GAS PNL BLDV: SIGNIFICANT CHANGE UP
GAS PNL BLDV: SIGNIFICANT CHANGE UP
GLUCOSE BLDC GLUCOMTR-MCNC: 137 MG/DL — HIGH (ref 70–99)
GLUCOSE BLDC GLUCOMTR-MCNC: 180 MG/DL — HIGH (ref 70–99)
GLUCOSE BLDC GLUCOMTR-MCNC: 202 MG/DL — HIGH (ref 70–99)
GLUCOSE SERPL-MCNC: 102 MG/DL — HIGH (ref 70–99)
GLUCOSE SERPL-MCNC: 103 MG/DL — HIGH (ref 70–99)
GLUCOSE SERPL-MCNC: 103 MG/DL — HIGH (ref 70–99)
GLUCOSE SERPL-MCNC: 105 MG/DL — HIGH (ref 70–99)
GLUCOSE SERPL-MCNC: 116 MG/DL — HIGH (ref 70–99)
GLUCOSE SERPL-MCNC: 117 MG/DL — HIGH (ref 70–99)
GLUCOSE SERPL-MCNC: 139 MG/DL — HIGH (ref 70–99)
GLUCOSE SERPL-MCNC: 151 MG/DL — HIGH (ref 70–99)
GLUCOSE SERPL-MCNC: 161 MG/DL — HIGH (ref 70–99)
GLUCOSE SERPL-MCNC: 163 MG/DL — HIGH (ref 70–99)
GLUCOSE SERPL-MCNC: 167 MG/DL — HIGH (ref 70–99)
GLUCOSE SERPL-MCNC: 202 MG/DL — HIGH (ref 70–99)
GLUCOSE SERPL-MCNC: 205 MG/DL — HIGH (ref 70–99)
GLUCOSE SERPL-MCNC: 225 MG/DL — HIGH (ref 70–99)
GLUCOSE SERPL-MCNC: 304 MG/DL — HIGH (ref 70–99)
GLUCOSE UR QL: 100 MG/DL
HBA1C BLD-MCNC: 5.4 % — SIGNIFICANT CHANGE UP (ref 4–5.6)
HCO3 BLDA-SCNC: 33 MMOL/L — HIGH (ref 23–27)
HCO3 BLDA-SCNC: 35 MMOL/L — HIGH (ref 23–27)
HCO3 BLDV-SCNC: 32 MMOL/L — HIGH (ref 22–29)
HCO3 BLDV-SCNC: 37 MMOL/L — HIGH (ref 22–29)
HCT VFR BLD CALC: 30.1 % — LOW (ref 37–47)
HCT VFR BLD CALC: 31.9 % — LOW (ref 37–47)
HCT VFR BLD CALC: 32.7 % — LOW (ref 37–47)
HCT VFR BLD CALC: 35.6 % — LOW (ref 37–47)
HCT VFR BLD CALC: 36.1 % — LOW (ref 37–47)
HCT VFR BLD CALC: 36.4 % — LOW (ref 37–47)
HCT VFR BLD CALC: 36.9 % — LOW (ref 37–47)
HCT VFR BLD CALC: 37.5 % — SIGNIFICANT CHANGE UP (ref 37–47)
HCT VFR BLD CALC: 37.6 % — SIGNIFICANT CHANGE UP (ref 37–47)
HCT VFR BLD CALC: 37.9 % — SIGNIFICANT CHANGE UP (ref 37–47)
HCT VFR BLD CALC: 38 % — SIGNIFICANT CHANGE UP (ref 37–47)
HCT VFR BLD CALC: 38.4 % — SIGNIFICANT CHANGE UP (ref 37–47)
HCT VFR BLD CALC: 38.4 % — SIGNIFICANT CHANGE UP (ref 37–47)
HCT VFR BLD CALC: 39.1 % — SIGNIFICANT CHANGE UP (ref 37–47)
HCT VFR BLD CALC: 39.5 % — SIGNIFICANT CHANGE UP (ref 37–47)
HCT VFR BLD CALC: 40.3 % — SIGNIFICANT CHANGE UP (ref 37–47)
HCT VFR BLD CALC: 42.6 % — SIGNIFICANT CHANGE UP (ref 37–47)
HCT VFR BLDA CALC: 38.9 % — SIGNIFICANT CHANGE UP (ref 34–44)
HCT VFR BLDA CALC: 41.6 % — SIGNIFICANT CHANGE UP (ref 34–44)
HGB BLD CALC-MCNC: 12.7 G/DL — LOW (ref 14–18)
HGB BLD CALC-MCNC: 13.6 G/DL — LOW (ref 14–18)
HGB BLD-MCNC: 10.4 G/DL — LOW (ref 12–16)
HGB BLD-MCNC: 10.5 G/DL — LOW (ref 12–16)
HGB BLD-MCNC: 11.3 G/DL — LOW (ref 12–16)
HGB BLD-MCNC: 11.4 G/DL — LOW (ref 12–16)
HGB BLD-MCNC: 11.6 G/DL — LOW (ref 12–16)
HGB BLD-MCNC: 11.7 G/DL — LOW (ref 12–16)
HGB BLD-MCNC: 11.8 G/DL — LOW (ref 12–16)
HGB BLD-MCNC: 11.9 G/DL — LOW (ref 12–16)
HGB BLD-MCNC: 12 G/DL — SIGNIFICANT CHANGE UP (ref 12–16)
HGB BLD-MCNC: 12.3 G/DL — SIGNIFICANT CHANGE UP (ref 12–16)
HGB BLD-MCNC: 12.4 G/DL — SIGNIFICANT CHANGE UP (ref 12–16)
HGB BLD-MCNC: 12.4 G/DL — SIGNIFICANT CHANGE UP (ref 12–16)
HGB BLD-MCNC: 12.5 G/DL — SIGNIFICANT CHANGE UP (ref 12–16)
HGB BLD-MCNC: 12.6 G/DL — SIGNIFICANT CHANGE UP (ref 12–16)
HGB BLD-MCNC: 12.7 G/DL — SIGNIFICANT CHANGE UP (ref 12–16)
HGB BLD-MCNC: 13.6 G/DL — SIGNIFICANT CHANGE UP (ref 12–16)
HGB BLD-MCNC: 9.8 G/DL — LOW (ref 12–16)
HOROWITZ INDEX BLDA+IHG-RTO: 60 — SIGNIFICANT CHANGE UP
HYPOCHROMIA BLD QL: SLIGHT — SIGNIFICANT CHANGE UP
IMM GRANULOCYTES NFR BLD AUTO: 0.3 % — SIGNIFICANT CHANGE UP (ref 0.1–0.3)
IMM GRANULOCYTES NFR BLD AUTO: 0.4 % — HIGH (ref 0.1–0.3)
IMM GRANULOCYTES NFR BLD AUTO: 0.4 % — HIGH (ref 0.1–0.3)
IMM GRANULOCYTES NFR BLD AUTO: 0.5 % — HIGH (ref 0.1–0.3)
IMM GRANULOCYTES NFR BLD AUTO: 0.7 % — HIGH (ref 0.1–0.3)
IMM GRANULOCYTES NFR BLD AUTO: 0.8 % — HIGH (ref 0.1–0.3)
INR BLD: 0.97 RATIO — SIGNIFICANT CHANGE UP (ref 0.65–1.3)
KETONES UR-MCNC: NEGATIVE — SIGNIFICANT CHANGE UP
LACTATE BLDV-MCNC: 1.6 MMOL/L — SIGNIFICANT CHANGE UP (ref 0.5–1.6)
LACTATE BLDV-MCNC: 3.9 MMOL/L — HIGH (ref 0.5–1.6)
LEUKOCYTE ESTERASE UR-ACNC: NEGATIVE — SIGNIFICANT CHANGE UP
LYMPHOCYTES # BLD AUTO: 10.33 K/UL — HIGH (ref 1.2–3.4)
LYMPHOCYTES # BLD AUTO: 17.9 % — LOW (ref 20.5–51.1)
LYMPHOCYTES # BLD AUTO: 24.1 % — SIGNIFICANT CHANGE UP (ref 20.5–51.1)
LYMPHOCYTES # BLD AUTO: 27 % — SIGNIFICANT CHANGE UP (ref 20.5–51.1)
LYMPHOCYTES # BLD AUTO: 27.4 % — SIGNIFICANT CHANGE UP (ref 20.5–51.1)
LYMPHOCYTES # BLD AUTO: 27.6 % — SIGNIFICANT CHANGE UP (ref 20.5–51.1)
LYMPHOCYTES # BLD AUTO: 3.12 K/UL — SIGNIFICANT CHANGE UP (ref 1.2–3.4)
LYMPHOCYTES # BLD AUTO: 3.2 K/UL — SIGNIFICANT CHANGE UP (ref 1.2–3.4)
LYMPHOCYTES # BLD AUTO: 3.64 K/UL — HIGH (ref 1.2–3.4)
LYMPHOCYTES # BLD AUTO: 30.4 % — SIGNIFICANT CHANGE UP (ref 20.5–51.1)
LYMPHOCYTES # BLD AUTO: 30.7 % — SIGNIFICANT CHANGE UP (ref 20.5–51.1)
LYMPHOCYTES # BLD AUTO: 34.3 % — SIGNIFICANT CHANGE UP (ref 20.5–51.1)
LYMPHOCYTES # BLD AUTO: 37.2 % — SIGNIFICANT CHANGE UP (ref 20.5–51.1)
LYMPHOCYTES # BLD AUTO: 4.09 K/UL — HIGH (ref 1.2–3.4)
LYMPHOCYTES # BLD AUTO: 45.3 % — SIGNIFICANT CHANGE UP (ref 20.5–51.1)
LYMPHOCYTES # BLD AUTO: 5.14 K/UL — HIGH (ref 1.2–3.4)
LYMPHOCYTES # BLD AUTO: 5.22 K/UL — HIGH (ref 1.2–3.4)
LYMPHOCYTES # BLD AUTO: 5.33 K/UL — HIGH (ref 1.2–3.4)
LYMPHOCYTES # BLD AUTO: 5.49 K/UL — HIGH (ref 1.2–3.4)
LYMPHOCYTES # BLD AUTO: 50.2 % — SIGNIFICANT CHANGE UP (ref 20.5–51.1)
LYMPHOCYTES # BLD AUTO: 54.3 % — HIGH (ref 20.5–51.1)
LYMPHOCYTES # BLD AUTO: 6.62 K/UL — HIGH (ref 1.2–3.4)
LYMPHOCYTES # BLD AUTO: 6.74 K/UL — HIGH (ref 1.2–3.4)
LYMPHOCYTES # BLD AUTO: 9.19 K/UL — HIGH (ref 1.2–3.4)
MACROCYTES BLD QL: SIGNIFICANT CHANGE UP
MAGNESIUM SERPL-MCNC: 2.1 MG/DL — SIGNIFICANT CHANGE UP (ref 1.8–2.4)
MAGNESIUM SERPL-MCNC: 2.2 MG/DL — SIGNIFICANT CHANGE UP (ref 1.8–2.4)
MAGNESIUM SERPL-MCNC: 2.2 MG/DL — SIGNIFICANT CHANGE UP (ref 1.8–2.4)
MAGNESIUM SERPL-MCNC: 2.3 MG/DL — SIGNIFICANT CHANGE UP (ref 1.8–2.4)
MAGNESIUM SERPL-MCNC: 2.5 MG/DL — HIGH (ref 1.8–2.4)
MAGNESIUM SERPL-MCNC: 2.5 MG/DL — HIGH (ref 1.8–2.4)
MAGNESIUM SERPL-MCNC: 2.7 MG/DL — HIGH (ref 1.8–2.4)
MCHC RBC-ENTMCNC: 30.7 G/DL — LOW (ref 32–37)
MCHC RBC-ENTMCNC: 31.5 G/DL — LOW (ref 32–37)
MCHC RBC-ENTMCNC: 31.6 G/DL — LOW (ref 32–37)
MCHC RBC-ENTMCNC: 31.6 PG — HIGH (ref 27–31)
MCHC RBC-ENTMCNC: 31.7 G/DL — LOW (ref 32–37)
MCHC RBC-ENTMCNC: 31.7 PG — HIGH (ref 27–31)
MCHC RBC-ENTMCNC: 31.9 G/DL — LOW (ref 32–37)
MCHC RBC-ENTMCNC: 31.9 PG — HIGH (ref 27–31)
MCHC RBC-ENTMCNC: 31.9 PG — HIGH (ref 27–31)
MCHC RBC-ENTMCNC: 32 G/DL — SIGNIFICANT CHANGE UP (ref 32–37)
MCHC RBC-ENTMCNC: 32.1 G/DL — SIGNIFICANT CHANGE UP (ref 32–37)
MCHC RBC-ENTMCNC: 32.1 PG — HIGH (ref 27–31)
MCHC RBC-ENTMCNC: 32.1 PG — HIGH (ref 27–31)
MCHC RBC-ENTMCNC: 32.2 PG — HIGH (ref 27–31)
MCHC RBC-ENTMCNC: 32.2 PG — HIGH (ref 27–31)
MCHC RBC-ENTMCNC: 32.3 PG — HIGH (ref 27–31)
MCHC RBC-ENTMCNC: 32.5 PG — HIGH (ref 27–31)
MCHC RBC-ENTMCNC: 32.6 G/DL — SIGNIFICANT CHANGE UP (ref 32–37)
MCHC RBC-ENTMCNC: 32.6 G/DL — SIGNIFICANT CHANGE UP (ref 32–37)
MCHC RBC-ENTMCNC: 32.6 PG — HIGH (ref 27–31)
MCHC RBC-ENTMCNC: 32.7 G/DL — SIGNIFICANT CHANGE UP (ref 32–37)
MCHC RBC-ENTMCNC: 32.7 PG — HIGH (ref 27–31)
MCHC RBC-ENTMCNC: 32.8 PG — HIGH (ref 27–31)
MCHC RBC-ENTMCNC: 32.9 G/DL — SIGNIFICANT CHANGE UP (ref 32–37)
MCV RBC AUTO: 100 FL — HIGH (ref 81–99)
MCV RBC AUTO: 100.5 FL — HIGH (ref 81–99)
MCV RBC AUTO: 100.5 FL — HIGH (ref 81–99)
MCV RBC AUTO: 100.6 FL — HIGH (ref 81–99)
MCV RBC AUTO: 101.4 FL — HIGH (ref 81–99)
MCV RBC AUTO: 101.6 FL — HIGH (ref 81–99)
MCV RBC AUTO: 101.7 FL — HIGH (ref 81–99)
MCV RBC AUTO: 101.8 FL — HIGH (ref 81–99)
MCV RBC AUTO: 101.8 FL — HIGH (ref 81–99)
MCV RBC AUTO: 102 FL — HIGH (ref 81–99)
MCV RBC AUTO: 102.5 FL — HIGH (ref 81–99)
MCV RBC AUTO: 105.2 FL — HIGH (ref 81–99)
MCV RBC AUTO: 98.7 FL — SIGNIFICANT CHANGE UP (ref 81–99)
MCV RBC AUTO: 99 FL — SIGNIFICANT CHANGE UP (ref 81–99)
MCV RBC AUTO: 99.5 FL — HIGH (ref 81–99)
MCV RBC AUTO: 99.5 FL — HIGH (ref 81–99)
MCV RBC AUTO: 99.7 FL — HIGH (ref 81–99)
MONOCYTES # BLD AUTO: 0.17 K/UL — SIGNIFICANT CHANGE UP (ref 0.1–0.6)
MONOCYTES # BLD AUTO: 0.49 K/UL — SIGNIFICANT CHANGE UP (ref 0.1–0.6)
MONOCYTES # BLD AUTO: 0.71 K/UL — HIGH (ref 0.1–0.6)
MONOCYTES # BLD AUTO: 0.84 K/UL — HIGH (ref 0.1–0.6)
MONOCYTES # BLD AUTO: 0.92 K/UL — HIGH (ref 0.1–0.6)
MONOCYTES # BLD AUTO: 1.01 K/UL — HIGH (ref 0.1–0.6)
MONOCYTES # BLD AUTO: 1.08 K/UL — HIGH (ref 0.1–0.6)
MONOCYTES # BLD AUTO: 1.12 K/UL — HIGH (ref 0.1–0.6)
MONOCYTES # BLD AUTO: 1.15 K/UL — HIGH (ref 0.1–0.6)
MONOCYTES # BLD AUTO: 1.3 K/UL — HIGH (ref 0.1–0.6)
MONOCYTES # BLD AUTO: 1.34 K/UL — HIGH (ref 0.1–0.6)
MONOCYTES # BLD AUTO: 1.42 K/UL — HIGH (ref 0.1–0.6)
MONOCYTES NFR BLD AUTO: 2.7 % — SIGNIFICANT CHANGE UP (ref 1.7–9.3)
MONOCYTES NFR BLD AUTO: 3.3 % — SIGNIFICANT CHANGE UP (ref 1.7–9.3)
MONOCYTES NFR BLD AUTO: 4.4 % — SIGNIFICANT CHANGE UP (ref 1.7–9.3)
MONOCYTES NFR BLD AUTO: 4.6 % — SIGNIFICANT CHANGE UP (ref 1.7–9.3)
MONOCYTES NFR BLD AUTO: 4.7 % — SIGNIFICANT CHANGE UP (ref 1.7–9.3)
MONOCYTES NFR BLD AUTO: 5 % — SIGNIFICANT CHANGE UP (ref 1.7–9.3)
MONOCYTES NFR BLD AUTO: 5.3 % — SIGNIFICANT CHANGE UP (ref 1.7–9.3)
MONOCYTES NFR BLD AUTO: 6.1 % — SIGNIFICANT CHANGE UP (ref 1.7–9.3)
MONOCYTES NFR BLD AUTO: 6.2 % — SIGNIFICANT CHANGE UP (ref 1.7–9.3)
MONOCYTES NFR BLD AUTO: 7.2 % — SIGNIFICANT CHANGE UP (ref 1.7–9.3)
MONOCYTES NFR BLD AUTO: 7.5 % — SIGNIFICANT CHANGE UP (ref 1.7–9.3)
MONOCYTES NFR BLD AUTO: 8.7 % — SIGNIFICANT CHANGE UP (ref 1.7–9.3)
MRSA PCR RESULT.: NEGATIVE — SIGNIFICANT CHANGE UP
NEUTROPHILS # BLD AUTO: 12.1 K/UL — HIGH (ref 1.4–6.5)
NEUTROPHILS # BLD AUTO: 13.51 K/UL — HIGH (ref 1.4–6.5)
NEUTROPHILS # BLD AUTO: 15.38 K/UL — HIGH (ref 1.4–6.5)
NEUTROPHILS # BLD AUTO: 17.42 K/UL — HIGH (ref 1.4–6.5)
NEUTROPHILS # BLD AUTO: 19.41 K/UL — HIGH (ref 1.4–6.5)
NEUTROPHILS # BLD AUTO: 2.88 K/UL — SIGNIFICANT CHANGE UP (ref 1.4–6.5)
NEUTROPHILS # BLD AUTO: 6.59 K/UL — HIGH (ref 1.4–6.5)
NEUTROPHILS # BLD AUTO: 6.71 K/UL — HIGH (ref 1.4–6.5)
NEUTROPHILS # BLD AUTO: 7.07 K/UL — HIGH (ref 1.4–6.5)
NEUTROPHILS # BLD AUTO: 8.91 K/UL — HIGH (ref 1.4–6.5)
NEUTROPHILS # BLD AUTO: 9.02 K/UL — HIGH (ref 1.4–6.5)
NEUTROPHILS # BLD AUTO: 9.98 K/UL — HIGH (ref 1.4–6.5)
NEUTROPHILS NFR BLD AUTO: 37.1 % — LOW (ref 42.2–75.2)
NEUTROPHILS NFR BLD AUTO: 44.3 % — SIGNIFICANT CHANGE UP (ref 42.2–75.2)
NEUTROPHILS NFR BLD AUTO: 46.4 % — SIGNIFICANT CHANGE UP (ref 42.2–75.2)
NEUTROPHILS NFR BLD AUTO: 56.1 % — SIGNIFICANT CHANGE UP (ref 42.2–75.2)
NEUTROPHILS NFR BLD AUTO: 58 % — SIGNIFICANT CHANGE UP (ref 42.2–75.2)
NEUTROPHILS NFR BLD AUTO: 64.1 % — SIGNIFICANT CHANGE UP (ref 42.2–75.2)
NEUTROPHILS NFR BLD AUTO: 64.2 % — SIGNIFICANT CHANGE UP (ref 42.2–75.2)
NEUTROPHILS NFR BLD AUTO: 64.2 % — SIGNIFICANT CHANGE UP (ref 42.2–75.2)
NEUTROPHILS NFR BLD AUTO: 66.2 % — SIGNIFICANT CHANGE UP (ref 42.2–75.2)
NEUTROPHILS NFR BLD AUTO: 67.4 % — SIGNIFICANT CHANGE UP (ref 42.2–75.2)
NEUTROPHILS NFR BLD AUTO: 72 % — SIGNIFICANT CHANGE UP (ref 42.2–75.2)
NEUTROPHILS NFR BLD AUTO: 76.2 % — HIGH (ref 42.2–75.2)
NITRITE UR-MCNC: NEGATIVE — SIGNIFICANT CHANGE UP
NRBC # BLD: 0 /100 WBCS — SIGNIFICANT CHANGE UP (ref 0–0)
NRBC # BLD: 0 /100 — SIGNIFICANT CHANGE UP (ref 0–0)
NT-PROBNP SERPL-SCNC: 2241 PG/ML — HIGH (ref 0–300)
NT-PROBNP SERPL-SCNC: 4849 PG/ML — HIGH (ref 0–300)
OVALOCYTES BLD QL SMEAR: SIGNIFICANT CHANGE UP
PCO2 BLDA: 56 MMHG — HIGH (ref 38–42)
PCO2 BLDA: 72 MMHG — CRITICAL HIGH (ref 38–42)
PCO2 BLDV: 103 MMHG — HIGH (ref 41–51)
PCO2 BLDV: 77 MMHG — HIGH (ref 41–51)
PH BLDA: 7.27 — LOW (ref 7.38–7.42)
PH BLDA: 7.4 — SIGNIFICANT CHANGE UP (ref 7.38–7.42)
PH BLDV: 7.09 — LOW (ref 7.26–7.43)
PH BLDV: 7.29 — SIGNIFICANT CHANGE UP (ref 7.26–7.43)
PH UR: 5.5 — SIGNIFICANT CHANGE UP (ref 5–8)
PHOSPHATE SERPL-MCNC: 3.4 MG/DL — SIGNIFICANT CHANGE UP (ref 2.1–4.9)
PHOSPHATE SERPL-MCNC: 4 MG/DL — SIGNIFICANT CHANGE UP (ref 2.1–4.9)
PLAT MORPH BLD: NORMAL — SIGNIFICANT CHANGE UP
PLATELET # BLD AUTO: 188 K/UL — SIGNIFICANT CHANGE UP (ref 130–400)
PLATELET # BLD AUTO: 213 K/UL — SIGNIFICANT CHANGE UP (ref 130–400)
PLATELET # BLD AUTO: 222 K/UL — SIGNIFICANT CHANGE UP (ref 130–400)
PLATELET # BLD AUTO: 225 K/UL — SIGNIFICANT CHANGE UP (ref 130–400)
PLATELET # BLD AUTO: 230 K/UL — SIGNIFICANT CHANGE UP (ref 130–400)
PLATELET # BLD AUTO: 243 K/UL — SIGNIFICANT CHANGE UP (ref 130–400)
PLATELET # BLD AUTO: 244 K/UL — SIGNIFICANT CHANGE UP (ref 130–400)
PLATELET # BLD AUTO: 247 K/UL — SIGNIFICANT CHANGE UP (ref 130–400)
PLATELET # BLD AUTO: 250 K/UL — SIGNIFICANT CHANGE UP (ref 130–400)
PLATELET # BLD AUTO: 252 K/UL — SIGNIFICANT CHANGE UP (ref 130–400)
PLATELET # BLD AUTO: 263 K/UL — SIGNIFICANT CHANGE UP (ref 130–400)
PLATELET # BLD AUTO: 263 K/UL — SIGNIFICANT CHANGE UP (ref 130–400)
PLATELET # BLD AUTO: 266 K/UL — SIGNIFICANT CHANGE UP (ref 130–400)
PLATELET # BLD AUTO: 268 K/UL — SIGNIFICANT CHANGE UP (ref 130–400)
PLATELET # BLD AUTO: 268 K/UL — SIGNIFICANT CHANGE UP (ref 130–400)
PLATELET # BLD AUTO: 283 K/UL — SIGNIFICANT CHANGE UP (ref 130–400)
PLATELET # BLD AUTO: 311 K/UL — SIGNIFICANT CHANGE UP (ref 130–400)
PO2 BLDA: 128 MMHG — HIGH (ref 78–95)
PO2 BLDA: 78 MMHG — SIGNIFICANT CHANGE UP (ref 78–95)
PO2 BLDV: 27 MMHG — SIGNIFICANT CHANGE UP (ref 20–40)
PO2 BLDV: 59 MMHG — HIGH (ref 20–40)
POTASSIUM BLDV-SCNC: 4 MMOL/L — SIGNIFICANT CHANGE UP (ref 3.3–5.6)
POTASSIUM BLDV-SCNC: 4.4 MMOL/L — SIGNIFICANT CHANGE UP (ref 3.3–5.6)
POTASSIUM SERPL-MCNC: 3.4 MMOL/L — LOW (ref 3.5–5)
POTASSIUM SERPL-MCNC: 3.7 MMOL/L — SIGNIFICANT CHANGE UP (ref 3.5–5)
POTASSIUM SERPL-MCNC: 3.7 MMOL/L — SIGNIFICANT CHANGE UP (ref 3.5–5)
POTASSIUM SERPL-MCNC: 4 MMOL/L — SIGNIFICANT CHANGE UP (ref 3.5–5)
POTASSIUM SERPL-MCNC: 4.3 MMOL/L — SIGNIFICANT CHANGE UP (ref 3.5–5)
POTASSIUM SERPL-MCNC: 4.3 MMOL/L — SIGNIFICANT CHANGE UP (ref 3.5–5)
POTASSIUM SERPL-MCNC: 4.4 MMOL/L — SIGNIFICANT CHANGE UP (ref 3.5–5)
POTASSIUM SERPL-MCNC: 4.4 MMOL/L — SIGNIFICANT CHANGE UP (ref 3.5–5)
POTASSIUM SERPL-MCNC: 4.7 MMOL/L — SIGNIFICANT CHANGE UP (ref 3.5–5)
POTASSIUM SERPL-MCNC: 4.8 MMOL/L — SIGNIFICANT CHANGE UP (ref 3.5–5)
POTASSIUM SERPL-MCNC: 4.8 MMOL/L — SIGNIFICANT CHANGE UP (ref 3.5–5)
POTASSIUM SERPL-MCNC: 4.9 MMOL/L — SIGNIFICANT CHANGE UP (ref 3.5–5)
POTASSIUM SERPL-MCNC: 4.9 MMOL/L — SIGNIFICANT CHANGE UP (ref 3.5–5)
POTASSIUM SERPL-MCNC: 5.3 MMOL/L — HIGH (ref 3.5–5)
POTASSIUM SERPL-MCNC: 5.4 MMOL/L — HIGH (ref 3.5–5)
POTASSIUM SERPL-SCNC: 3.4 MMOL/L — LOW (ref 3.5–5)
POTASSIUM SERPL-SCNC: 3.7 MMOL/L — SIGNIFICANT CHANGE UP (ref 3.5–5)
POTASSIUM SERPL-SCNC: 3.7 MMOL/L — SIGNIFICANT CHANGE UP (ref 3.5–5)
POTASSIUM SERPL-SCNC: 4 MMOL/L — SIGNIFICANT CHANGE UP (ref 3.5–5)
POTASSIUM SERPL-SCNC: 4.3 MMOL/L — SIGNIFICANT CHANGE UP (ref 3.5–5)
POTASSIUM SERPL-SCNC: 4.3 MMOL/L — SIGNIFICANT CHANGE UP (ref 3.5–5)
POTASSIUM SERPL-SCNC: 4.4 MMOL/L — SIGNIFICANT CHANGE UP (ref 3.5–5)
POTASSIUM SERPL-SCNC: 4.4 MMOL/L — SIGNIFICANT CHANGE UP (ref 3.5–5)
POTASSIUM SERPL-SCNC: 4.7 MMOL/L — SIGNIFICANT CHANGE UP (ref 3.5–5)
POTASSIUM SERPL-SCNC: 4.8 MMOL/L — SIGNIFICANT CHANGE UP (ref 3.5–5)
POTASSIUM SERPL-SCNC: 4.8 MMOL/L — SIGNIFICANT CHANGE UP (ref 3.5–5)
POTASSIUM SERPL-SCNC: 4.9 MMOL/L — SIGNIFICANT CHANGE UP (ref 3.5–5)
POTASSIUM SERPL-SCNC: 4.9 MMOL/L — SIGNIFICANT CHANGE UP (ref 3.5–5)
POTASSIUM SERPL-SCNC: 5.3 MMOL/L — HIGH (ref 3.5–5)
POTASSIUM SERPL-SCNC: 5.4 MMOL/L — HIGH (ref 3.5–5)
PROCALCITONIN SERPL-MCNC: 3.2 NG/ML — HIGH (ref 0.02–0.1)
PROT SERPL-MCNC: 5 G/DL — LOW (ref 6–8)
PROT SERPL-MCNC: 5.3 G/DL — LOW (ref 6–8)
PROT SERPL-MCNC: 5.4 G/DL — LOW (ref 6–8)
PROT SERPL-MCNC: 5.9 G/DL — LOW (ref 6–8)
PROT SERPL-MCNC: 6.5 G/DL — SIGNIFICANT CHANGE UP (ref 6–8)
PROT SERPL-MCNC: 6.6 G/DL — SIGNIFICANT CHANGE UP (ref 6–8)
PROT SERPL-MCNC: 6.7 G/DL — SIGNIFICANT CHANGE UP (ref 6–8)
PROT SERPL-MCNC: 6.8 G/DL — SIGNIFICANT CHANGE UP (ref 6–8)
PROT SERPL-MCNC: 6.9 G/DL — SIGNIFICANT CHANGE UP (ref 6–8)
PROT SERPL-MCNC: 7.4 G/DL — SIGNIFICANT CHANGE UP (ref 6–8)
PROT SERPL-MCNC: 7.5 G/DL — SIGNIFICANT CHANGE UP (ref 6–8)
PROT SERPL-MCNC: 7.7 G/DL — SIGNIFICANT CHANGE UP (ref 6–8)
PROT UR-MCNC: NEGATIVE MG/DL — SIGNIFICANT CHANGE UP
PROTHROM AB SERPL-ACNC: 11.2 SEC — SIGNIFICANT CHANGE UP (ref 9.95–12.87)
RBC # BLD: 3.04 M/UL — LOW (ref 4.2–5.4)
RBC # BLD: 3.17 M/UL — LOW (ref 4.2–5.4)
RBC # BLD: 3.22 M/UL — LOW (ref 4.2–5.4)
RBC # BLD: 3.54 M/UL — LOW (ref 4.2–5.4)
RBC # BLD: 3.56 M/UL — LOW (ref 4.2–5.4)
RBC # BLD: 3.59 M/UL — LOW (ref 4.2–5.4)
RBC # BLD: 3.6 M/UL — LOW (ref 4.2–5.4)
RBC # BLD: 3.65 M/UL — LOW (ref 4.2–5.4)
RBC # BLD: 3.74 M/UL — LOW (ref 4.2–5.4)
RBC # BLD: 3.76 M/UL — LOW (ref 4.2–5.4)
RBC # BLD: 3.82 M/UL — LOW (ref 4.2–5.4)
RBC # BLD: 3.84 M/UL — LOW (ref 4.2–5.4)
RBC # BLD: 3.86 M/UL — LOW (ref 4.2–5.4)
RBC # BLD: 3.88 M/UL — LOW (ref 4.2–5.4)
RBC # BLD: 3.89 M/UL — LOW (ref 4.2–5.4)
RBC # BLD: 3.96 M/UL — LOW (ref 4.2–5.4)
RBC # BLD: 4.19 M/UL — LOW (ref 4.2–5.4)
RBC # FLD: 12.1 % — SIGNIFICANT CHANGE UP (ref 11.5–14.5)
RBC # FLD: 12.3 % — SIGNIFICANT CHANGE UP (ref 11.5–14.5)
RBC # FLD: 12.4 % — SIGNIFICANT CHANGE UP (ref 11.5–14.5)
RBC # FLD: 12.4 % — SIGNIFICANT CHANGE UP (ref 11.5–14.5)
RBC # FLD: 12.5 % — SIGNIFICANT CHANGE UP (ref 11.5–14.5)
RBC # FLD: 12.6 % — SIGNIFICANT CHANGE UP (ref 11.5–14.5)
RBC # FLD: 12.7 % — SIGNIFICANT CHANGE UP (ref 11.5–14.5)
RBC # FLD: 12.9 % — SIGNIFICANT CHANGE UP (ref 11.5–14.5)
RBC # FLD: 12.9 % — SIGNIFICANT CHANGE UP (ref 11.5–14.5)
RBC # FLD: 13.1 % — SIGNIFICANT CHANGE UP (ref 11.5–14.5)
RBC BLD AUTO: NORMAL — SIGNIFICANT CHANGE UP
SAO2 % BLDA: 92 % — LOW (ref 94–98)
SAO2 % BLDA: 98 % — SIGNIFICANT CHANGE UP (ref 94–98)
SAO2 % BLDV: 36 % — SIGNIFICANT CHANGE UP
SAO2 % BLDV: 76 % — SIGNIFICANT CHANGE UP
SODIUM SERPL-SCNC: 130 MMOL/L — LOW (ref 135–146)
SODIUM SERPL-SCNC: 132 MMOL/L — LOW (ref 135–146)
SODIUM SERPL-SCNC: 134 MMOL/L — LOW (ref 135–146)
SODIUM SERPL-SCNC: 135 MMOL/L — SIGNIFICANT CHANGE UP (ref 135–146)
SODIUM SERPL-SCNC: 138 MMOL/L — SIGNIFICANT CHANGE UP (ref 135–146)
SODIUM SERPL-SCNC: 139 MMOL/L — SIGNIFICANT CHANGE UP (ref 135–146)
SODIUM SERPL-SCNC: 141 MMOL/L — SIGNIFICANT CHANGE UP (ref 135–146)
SODIUM SERPL-SCNC: 141 MMOL/L — SIGNIFICANT CHANGE UP (ref 135–146)
SODIUM SERPL-SCNC: 142 MMOL/L — SIGNIFICANT CHANGE UP (ref 135–146)
SODIUM SERPL-SCNC: 142 MMOL/L — SIGNIFICANT CHANGE UP (ref 135–146)
SODIUM SERPL-SCNC: 143 MMOL/L — SIGNIFICANT CHANGE UP (ref 135–146)
SODIUM SERPL-SCNC: 144 MMOL/L — SIGNIFICANT CHANGE UP (ref 135–146)
SP GR SPEC: >=1.03 (ref 1.01–1.03)
SPECIMEN SOURCE: SIGNIFICANT CHANGE UP
TROPONIN T SERPL-MCNC: 0.03 NG/ML — CRITICAL HIGH
TROPONIN T SERPL-MCNC: 0.17 NG/ML — CRITICAL HIGH
TROPONIN T SERPL-MCNC: 0.19 NG/ML — CRITICAL HIGH
TROPONIN T SERPL-MCNC: 0.23 NG/ML — CRITICAL HIGH
TROPONIN T SERPL-MCNC: 0.25 NG/ML — CRITICAL HIGH
TROPONIN T SERPL-MCNC: 0.26 NG/ML — CRITICAL HIGH
TROPONIN T SERPL-MCNC: 0.29 NG/ML — CRITICAL HIGH
TROPONIN T SERPL-MCNC: 0.39 NG/ML — CRITICAL HIGH
TROPONIN T SERPL-MCNC: 0.84 NG/ML — CRITICAL HIGH
TROPONIN T SERPL-MCNC: 1.49 NG/ML — CRITICAL HIGH
TROPONIN T SERPL-MCNC: <0.01 NG/ML — SIGNIFICANT CHANGE UP
TSH SERPL-MCNC: 0.74 UIU/ML — SIGNIFICANT CHANGE UP (ref 0.27–4.2)
TSH SERPL-MCNC: 31.2 UIU/ML — HIGH (ref 0.27–4.2)
UROBILINOGEN FLD QL: 0.2 MG/DL — SIGNIFICANT CHANGE UP (ref 0.2–0.2)
WBC # BLD: 10.44 K/UL — SIGNIFICANT CHANGE UP (ref 4.8–10.8)
WBC # BLD: 11.52 K/UL — HIGH (ref 4.8–10.8)
WBC # BLD: 13.39 K/UL — HIGH (ref 4.8–10.8)
WBC # BLD: 13.48 K/UL — HIGH (ref 4.8–10.8)
WBC # BLD: 14.89 K/UL — HIGH (ref 4.8–10.8)
WBC # BLD: 15.55 K/UL — HIGH (ref 4.8–10.8)
WBC # BLD: 15.89 K/UL — HIGH (ref 4.8–10.8)
WBC # BLD: 17.78 K/UL — HIGH (ref 4.8–10.8)
WBC # BLD: 18.45 K/UL — HIGH (ref 4.8–10.8)
WBC # BLD: 18.88 K/UL — HIGH (ref 4.8–10.8)
WBC # BLD: 19.02 K/UL — HIGH (ref 4.8–10.8)
WBC # BLD: 20.06 K/UL — HIGH (ref 4.8–10.8)
WBC # BLD: 21.37 K/UL — HIGH (ref 4.8–10.8)
WBC # BLD: 21.43 K/UL — HIGH (ref 4.8–10.8)
WBC # BLD: 22.86 K/UL — HIGH (ref 4.8–10.8)
WBC # BLD: 30.24 K/UL — HIGH (ref 4.8–10.8)
WBC # BLD: 6.21 K/UL — SIGNIFICANT CHANGE UP (ref 4.8–10.8)
WBC # FLD AUTO: 10.44 K/UL — SIGNIFICANT CHANGE UP (ref 4.8–10.8)
WBC # FLD AUTO: 11.52 K/UL — HIGH (ref 4.8–10.8)
WBC # FLD AUTO: 13.39 K/UL — HIGH (ref 4.8–10.8)
WBC # FLD AUTO: 13.48 K/UL — HIGH (ref 4.8–10.8)
WBC # FLD AUTO: 14.89 K/UL — HIGH (ref 4.8–10.8)
WBC # FLD AUTO: 15.55 K/UL — HIGH (ref 4.8–10.8)
WBC # FLD AUTO: 15.89 K/UL — HIGH (ref 4.8–10.8)
WBC # FLD AUTO: 17.78 K/UL — HIGH (ref 4.8–10.8)
WBC # FLD AUTO: 18.45 K/UL — HIGH (ref 4.8–10.8)
WBC # FLD AUTO: 18.88 K/UL — HIGH (ref 4.8–10.8)
WBC # FLD AUTO: 19.02 K/UL — HIGH (ref 4.8–10.8)
WBC # FLD AUTO: 20.06 K/UL — HIGH (ref 4.8–10.8)
WBC # FLD AUTO: 21.37 K/UL — HIGH (ref 4.8–10.8)
WBC # FLD AUTO: 21.43 K/UL — HIGH (ref 4.8–10.8)
WBC # FLD AUTO: 22.86 K/UL — HIGH (ref 4.8–10.8)
WBC # FLD AUTO: 30.24 K/UL — HIGH (ref 4.8–10.8)
WBC # FLD AUTO: 6.21 K/UL — SIGNIFICANT CHANGE UP (ref 4.8–10.8)

## 2019-01-01 PROCEDURE — 99233 SBSQ HOSP IP/OBS HIGH 50: CPT

## 2019-01-01 PROCEDURE — 99291 CRITICAL CARE FIRST HOUR: CPT

## 2019-01-01 PROCEDURE — 71045 X-RAY EXAM CHEST 1 VIEW: CPT | Mod: 26

## 2019-01-01 PROCEDURE — 93010 ELECTROCARDIOGRAM REPORT: CPT

## 2019-01-01 PROCEDURE — 93306 TTE W/DOPPLER COMPLETE: CPT | Mod: 26

## 2019-01-01 PROCEDURE — 74018 RADEX ABDOMEN 1 VIEW: CPT | Mod: 26

## 2019-01-01 PROCEDURE — 71250 CT THORAX DX C-: CPT | Mod: 26

## 2019-01-01 PROCEDURE — 93970 EXTREMITY STUDY: CPT | Mod: 26

## 2019-01-01 PROCEDURE — 99239 HOSP IP/OBS DSCHRG MGMT >30: CPT

## 2019-01-01 PROCEDURE — 93010 ELECTROCARDIOGRAM REPORT: CPT | Mod: 77

## 2019-01-01 PROCEDURE — 71275 CT ANGIOGRAPHY CHEST: CPT | Mod: 26

## 2019-01-01 PROCEDURE — 99223 1ST HOSP IP/OBS HIGH 75: CPT | Mod: AI

## 2019-01-01 PROCEDURE — 99291 CRITICAL CARE FIRST HOUR: CPT | Mod: 25

## 2019-01-01 PROCEDURE — 93010 ELECTROCARDIOGRAM REPORT: CPT | Mod: 77,76

## 2019-01-01 PROCEDURE — 31500 INSERT EMERGENCY AIRWAY: CPT

## 2019-01-01 RX ORDER — INSULIN HUMAN 100 [IU]/ML
10 INJECTION, SOLUTION SUBCUTANEOUS ONCE
Refills: 0 | Status: DISCONTINUED | OUTPATIENT
Start: 2019-01-01 | End: 2019-01-01

## 2019-01-01 RX ORDER — FUROSEMIDE 40 MG
40 TABLET ORAL ONCE
Refills: 0 | Status: COMPLETED | OUTPATIENT
Start: 2019-01-01 | End: 2019-01-01

## 2019-01-01 RX ORDER — CEFEPIME 1 G/1
1000 INJECTION, POWDER, FOR SOLUTION INTRAMUSCULAR; INTRAVENOUS ONCE
Refills: 0 | Status: COMPLETED | OUTPATIENT
Start: 2019-01-01 | End: 2019-01-01

## 2019-01-01 RX ORDER — MIDAZOLAM HYDROCHLORIDE 1 MG/ML
0.02 INJECTION, SOLUTION INTRAMUSCULAR; INTRAVENOUS
Qty: 100 | Refills: 0 | Status: DISCONTINUED | OUTPATIENT
Start: 2019-01-01 | End: 2019-01-01

## 2019-01-01 RX ORDER — SODIUM CHLORIDE 9 MG/ML
1000 INJECTION INTRAMUSCULAR; INTRAVENOUS; SUBCUTANEOUS
Refills: 0 | Status: DISCONTINUED | OUTPATIENT
Start: 2019-01-01 | End: 2019-01-01

## 2019-01-01 RX ORDER — LANOLIN ALCOHOL/MO/W.PET/CERES
3 CREAM (GRAM) TOPICAL AT BEDTIME
Refills: 0 | Status: DISCONTINUED | OUTPATIENT
Start: 2019-01-01 | End: 2019-01-01

## 2019-01-01 RX ORDER — ACETAMINOPHEN 500 MG
650 TABLET ORAL EVERY 6 HOURS
Refills: 0 | Status: DISCONTINUED | OUTPATIENT
Start: 2019-01-01 | End: 2019-01-01

## 2019-01-01 RX ORDER — MIDAZOLAM HYDROCHLORIDE 1 MG/ML
2 INJECTION, SOLUTION INTRAMUSCULAR; INTRAVENOUS ONCE
Refills: 0 | Status: DISCONTINUED | OUTPATIENT
Start: 2019-01-01 | End: 2019-01-01

## 2019-01-01 RX ORDER — ATORVASTATIN CALCIUM 80 MG/1
1 TABLET, FILM COATED ORAL
Qty: 30 | Refills: 0
Start: 2019-01-01 | End: 2019-10-05

## 2019-01-01 RX ORDER — PROPOFOL 10 MG/ML
6 INJECTION, EMULSION INTRAVENOUS
Qty: 500 | Refills: 0 | Status: DISCONTINUED | OUTPATIENT
Start: 2019-01-01 | End: 2019-01-01

## 2019-01-01 RX ORDER — ASPIRIN/CALCIUM CARB/MAGNESIUM 324 MG
81 TABLET ORAL DAILY
Refills: 0 | Status: DISCONTINUED | OUTPATIENT
Start: 2019-01-01 | End: 2019-01-01

## 2019-01-01 RX ORDER — CLOPIDOGREL BISULFATE 75 MG/1
75 TABLET, FILM COATED ORAL DAILY
Refills: 0 | Status: DISCONTINUED | OUTPATIENT
Start: 2019-01-01 | End: 2019-01-01

## 2019-01-01 RX ORDER — CLOPIDOGREL BISULFATE 75 MG/1
1 TABLET, FILM COATED ORAL
Qty: 0 | Refills: 0 | DISCHARGE

## 2019-01-01 RX ORDER — PANTOPRAZOLE SODIUM 20 MG/1
40 TABLET, DELAYED RELEASE ORAL
Refills: 0 | Status: DISCONTINUED | OUTPATIENT
Start: 2019-01-01 | End: 2019-01-01

## 2019-01-01 RX ORDER — LACTULOSE 10 G/15ML
20 SOLUTION ORAL ONCE
Refills: 0 | Status: COMPLETED | OUTPATIENT
Start: 2019-01-01 | End: 2019-01-01

## 2019-01-01 RX ORDER — VANCOMYCIN HCL 1 G
1000 VIAL (EA) INTRAVENOUS EVERY 12 HOURS
Refills: 0 | Status: DISCONTINUED | OUTPATIENT
Start: 2019-01-01 | End: 2019-01-01

## 2019-01-01 RX ORDER — AZITHROMYCIN 500 MG/1
250 TABLET, FILM COATED ORAL EVERY 24 HOURS
Refills: 0 | Status: DISCONTINUED | OUTPATIENT
Start: 2019-01-01 | End: 2019-01-01

## 2019-01-01 RX ORDER — FLUTICASONE FUROATE, UMECLIDINIUM BROMIDE AND VILANTEROL TRIFENATATE 200; 62.5; 25 UG/1; UG/1; UG/1
1 POWDER RESPIRATORY (INHALATION)
Qty: 0 | Refills: 0 | DISCHARGE

## 2019-01-01 RX ORDER — METOPROLOL TARTRATE 50 MG
12.5 TABLET ORAL
Refills: 0 | Status: DISCONTINUED | OUTPATIENT
Start: 2019-01-01 | End: 2019-01-01

## 2019-01-01 RX ORDER — METOPROLOL TARTRATE 50 MG
1 TABLET ORAL
Qty: 30 | Refills: 3
Start: 2019-01-01 | End: 2020-01-03

## 2019-01-01 RX ORDER — IPRATROPIUM/ALBUTEROL SULFATE 18-103MCG
3 AEROSOL WITH ADAPTER (GRAM) INHALATION EVERY 6 HOURS
Refills: 0 | Status: DISCONTINUED | OUTPATIENT
Start: 2019-01-01 | End: 2019-01-01

## 2019-01-01 RX ORDER — ENOXAPARIN SODIUM 100 MG/ML
40 INJECTION SUBCUTANEOUS DAILY
Refills: 0 | Status: DISCONTINUED | OUTPATIENT
Start: 2019-01-01 | End: 2019-01-01

## 2019-01-01 RX ORDER — ESCITALOPRAM OXALATE 10 MG/1
1 TABLET, FILM COATED ORAL
Qty: 30 | Refills: 0
Start: 2019-01-01 | End: 2019-10-05

## 2019-01-01 RX ORDER — ALBUTEROL 90 UG/1
2.5 AEROSOL, METERED ORAL
Refills: 0 | Status: COMPLETED | OUTPATIENT
Start: 2019-01-01 | End: 2019-01-01

## 2019-01-01 RX ORDER — FUROSEMIDE 40 MG
40 TABLET ORAL
Refills: 0 | Status: DISCONTINUED | OUTPATIENT
Start: 2019-01-01 | End: 2019-01-01

## 2019-01-01 RX ORDER — ATORVASTATIN CALCIUM 80 MG/1
1 TABLET, FILM COATED ORAL
Qty: 30 | Refills: 3
Start: 2019-01-01 | End: 2020-01-03

## 2019-01-01 RX ORDER — ONDANSETRON 8 MG/1
4 TABLET, FILM COATED ORAL EVERY 6 HOURS
Refills: 0 | Status: DISCONTINUED | OUTPATIENT
Start: 2019-01-01 | End: 2019-01-01

## 2019-01-01 RX ORDER — ALBUTEROL 90 UG/1
2 AEROSOL, METERED ORAL EVERY 4 HOURS
Refills: 0 | Status: DISCONTINUED | OUTPATIENT
Start: 2019-01-01 | End: 2019-01-01

## 2019-01-01 RX ORDER — POLYETHYLENE GLYCOL 3350 17 G/17G
17 POWDER, FOR SOLUTION ORAL
Refills: 0 | Status: DISCONTINUED | OUTPATIENT
Start: 2019-01-01 | End: 2019-01-01

## 2019-01-01 RX ORDER — CEFEPIME 1 G/1
1000 INJECTION, POWDER, FOR SOLUTION INTRAMUSCULAR; INTRAVENOUS EVERY 12 HOURS
Refills: 0 | Status: DISCONTINUED | OUTPATIENT
Start: 2019-01-01 | End: 2019-01-01

## 2019-01-01 RX ORDER — INSULIN LISPRO 100/ML
10 VIAL (ML) SUBCUTANEOUS ONCE
Refills: 0 | Status: DISCONTINUED | OUTPATIENT
Start: 2019-01-01 | End: 2019-01-01

## 2019-01-01 RX ORDER — FUROSEMIDE 40 MG
1 TABLET ORAL
Qty: 30 | Refills: 0
Start: 2019-01-01 | End: 2019-10-05

## 2019-01-01 RX ORDER — DOCUSATE SODIUM 100 MG
100 CAPSULE ORAL THREE TIMES A DAY
Refills: 0 | Status: DISCONTINUED | OUTPATIENT
Start: 2019-01-01 | End: 2019-01-01

## 2019-01-01 RX ORDER — SENNA PLUS 8.6 MG/1
2 TABLET ORAL AT BEDTIME
Refills: 0 | Status: DISCONTINUED | OUTPATIENT
Start: 2019-01-01 | End: 2019-01-01

## 2019-01-01 RX ORDER — LEVOTHYROXINE SODIUM 125 MCG
75 TABLET ORAL DAILY
Refills: 0 | Status: DISCONTINUED | OUTPATIENT
Start: 2019-01-01 | End: 2019-01-01

## 2019-01-01 RX ORDER — ENOXAPARIN SODIUM 100 MG/ML
30 INJECTION SUBCUTANEOUS DAILY
Refills: 0 | Status: DISCONTINUED | OUTPATIENT
Start: 2019-01-01 | End: 2019-01-01

## 2019-01-01 RX ORDER — DILTIAZEM HCL 120 MG
180 CAPSULE, EXT RELEASE 24 HR ORAL DAILY
Refills: 0 | Status: DISCONTINUED | OUTPATIENT
Start: 2019-01-01 | End: 2019-01-01

## 2019-01-01 RX ORDER — SODIUM CHLORIDE 9 MG/ML
500 INJECTION INTRAMUSCULAR; INTRAVENOUS; SUBCUTANEOUS ONCE
Refills: 0 | Status: COMPLETED | OUTPATIENT
Start: 2019-01-01 | End: 2019-01-01

## 2019-01-01 RX ORDER — SODIUM CHLORIDE 9 MG/ML
250 INJECTION INTRAMUSCULAR; INTRAVENOUS; SUBCUTANEOUS ONCE
Refills: 0 | Status: COMPLETED | OUTPATIENT
Start: 2019-01-01 | End: 2019-01-01

## 2019-01-01 RX ORDER — FENTANYL CITRATE 50 UG/ML
0.5 INJECTION INTRAVENOUS
Qty: 2500 | Refills: 0 | Status: DISCONTINUED | OUTPATIENT
Start: 2019-01-01 | End: 2019-01-01

## 2019-01-01 RX ORDER — DEXTROSE 50 % IN WATER 50 %
50 SYRINGE (ML) INTRAVENOUS ONCE
Refills: 0 | Status: DISCONTINUED | OUTPATIENT
Start: 2019-01-01 | End: 2019-01-01

## 2019-01-01 RX ORDER — PIPERACILLIN AND TAZOBACTAM 4; .5 G/20ML; G/20ML
3.38 INJECTION, POWDER, LYOPHILIZED, FOR SOLUTION INTRAVENOUS EVERY 8 HOURS
Refills: 0 | Status: DISCONTINUED | OUTPATIENT
Start: 2019-01-01 | End: 2019-01-01

## 2019-01-01 RX ORDER — DILTIAZEM HCL 120 MG
60 CAPSULE, EXT RELEASE 24 HR ORAL EVERY 8 HOURS
Refills: 0 | Status: DISCONTINUED | OUTPATIENT
Start: 2019-01-01 | End: 2019-01-01

## 2019-01-01 RX ORDER — LEVOTHYROXINE SODIUM 125 MCG
37.5 TABLET ORAL AT BEDTIME
Refills: 0 | Status: DISCONTINUED | OUTPATIENT
Start: 2019-01-01 | End: 2019-01-01

## 2019-01-01 RX ORDER — CHLORHEXIDINE GLUCONATE 213 G/1000ML
15 SOLUTION TOPICAL EVERY 12 HOURS
Refills: 0 | Status: DISCONTINUED | OUTPATIENT
Start: 2019-01-01 | End: 2019-01-01

## 2019-01-01 RX ORDER — SUCCINYLCHOLINE CHLORIDE 100 MG/5ML
70 SYRINGE (ML) INTRAVENOUS ONCE
Refills: 0 | Status: COMPLETED | OUTPATIENT
Start: 2019-01-01 | End: 2019-01-01

## 2019-01-01 RX ORDER — MORPHINE SULFATE 50 MG/1
2 CAPSULE, EXTENDED RELEASE ORAL
Refills: 0 | Status: DISCONTINUED | OUTPATIENT
Start: 2019-01-01 | End: 2019-01-01

## 2019-01-01 RX ORDER — ATORVASTATIN CALCIUM 80 MG/1
80 TABLET, FILM COATED ORAL AT BEDTIME
Refills: 0 | Status: DISCONTINUED | OUTPATIENT
Start: 2019-01-01 | End: 2019-01-01

## 2019-01-01 RX ORDER — AZITHROMYCIN 500 MG/1
500 TABLET, FILM COATED ORAL EVERY 24 HOURS
Refills: 0 | Status: DISCONTINUED | OUTPATIENT
Start: 2019-01-01 | End: 2019-01-01

## 2019-01-01 RX ORDER — ALPRAZOLAM 0.25 MG
0.25 TABLET ORAL EVERY 12 HOURS
Refills: 0 | Status: DISCONTINUED | OUTPATIENT
Start: 2019-01-01 | End: 2019-01-01

## 2019-01-01 RX ORDER — MORPHINE SULFATE 50 MG/1
2 CAPSULE, EXTENDED RELEASE ORAL ONCE
Refills: 0 | Status: DISCONTINUED | OUTPATIENT
Start: 2019-01-01 | End: 2019-01-01

## 2019-01-01 RX ORDER — IPRATROPIUM/ALBUTEROL SULFATE 18-103MCG
3 AEROSOL WITH ADAPTER (GRAM) INHALATION
Refills: 0 | Status: DISCONTINUED | OUTPATIENT
Start: 2019-01-01 | End: 2019-01-01

## 2019-01-01 RX ORDER — ASPIRIN/CALCIUM CARB/MAGNESIUM 324 MG
1 TABLET ORAL
Qty: 0 | Refills: 0 | DISCHARGE

## 2019-01-01 RX ORDER — ETOMIDATE 2 MG/ML
10 INJECTION INTRAVENOUS ONCE
Refills: 0 | Status: COMPLETED | OUTPATIENT
Start: 2019-01-01 | End: 2019-01-01

## 2019-01-01 RX ORDER — DILTIAZEM HCL 120 MG
1 CAPSULE, EXT RELEASE 24 HR ORAL
Qty: 0 | Refills: 0 | DISCHARGE

## 2019-01-01 RX ORDER — ALPRAZOLAM 0.25 MG
0.5 TABLET ORAL DAILY
Refills: 0 | Status: DISCONTINUED | OUTPATIENT
Start: 2019-01-01 | End: 2019-01-01

## 2019-01-01 RX ORDER — CLOPIDOGREL BISULFATE 75 MG/1
1 TABLET, FILM COATED ORAL
Qty: 30 | Refills: 3
Start: 2019-01-01 | End: 2020-01-03

## 2019-01-01 RX ORDER — ALPRAZOLAM 0.25 MG
0.25 TABLET ORAL
Refills: 0 | Status: DISCONTINUED | OUTPATIENT
Start: 2019-01-01 | End: 2019-01-01

## 2019-01-01 RX ORDER — BUDESONIDE AND FORMOTEROL FUMARATE DIHYDRATE 160; 4.5 UG/1; UG/1
2 AEROSOL RESPIRATORY (INHALATION)
Refills: 0 | Status: DISCONTINUED | OUTPATIENT
Start: 2019-01-01 | End: 2019-01-01

## 2019-01-01 RX ORDER — FUROSEMIDE 40 MG
40 TABLET ORAL ONCE
Refills: 0 | Status: DISCONTINUED | OUTPATIENT
Start: 2019-01-01 | End: 2019-01-01

## 2019-01-01 RX ORDER — NITROGLYCERIN 6.5 MG
0.4 CAPSULE, EXTENDED RELEASE ORAL ONCE
Refills: 0 | Status: COMPLETED | OUTPATIENT
Start: 2019-01-01 | End: 2019-01-01

## 2019-01-01 RX ORDER — ENOXAPARIN SODIUM 100 MG/ML
40 INJECTION SUBCUTANEOUS EVERY 12 HOURS
Refills: 0 | Status: DISCONTINUED | OUTPATIENT
Start: 2019-01-01 | End: 2019-01-01

## 2019-01-01 RX ORDER — BUDESONIDE AND FORMOTEROL FUMARATE DIHYDRATE 160; 4.5 UG/1; UG/1
2 AEROSOL RESPIRATORY (INHALATION)
Qty: 0 | Refills: 0 | DISCHARGE

## 2019-01-01 RX ORDER — FENTANYL CITRATE 50 UG/ML
50 INJECTION INTRAVENOUS ONCE
Refills: 0 | Status: DISCONTINUED | OUTPATIENT
Start: 2019-01-01 | End: 2019-01-01

## 2019-01-01 RX ORDER — MORPHINE SULFATE 50 MG/1
1 CAPSULE, EXTENDED RELEASE ORAL
Qty: 100 | Refills: 0 | Status: DISCONTINUED | OUTPATIENT
Start: 2019-01-01 | End: 2019-01-01

## 2019-01-01 RX ORDER — HEPARIN SODIUM 5000 [USP'U]/ML
600 INJECTION INTRAVENOUS; SUBCUTANEOUS
Qty: 25000 | Refills: 0 | Status: DISCONTINUED | OUTPATIENT
Start: 2019-01-01 | End: 2019-01-01

## 2019-01-01 RX ORDER — ASPIRIN/CALCIUM CARB/MAGNESIUM 324 MG
1 TABLET ORAL
Qty: 30 | Refills: 3
Start: 2019-01-01 | End: 2020-01-03

## 2019-01-01 RX ORDER — PANTOPRAZOLE SODIUM 20 MG/1
40 TABLET, DELAYED RELEASE ORAL DAILY
Refills: 0 | Status: DISCONTINUED | OUTPATIENT
Start: 2019-01-01 | End: 2019-01-01

## 2019-01-01 RX ORDER — LEVOTHYROXINE SODIUM 125 MCG
1 TABLET ORAL
Qty: 0 | Refills: 0 | DISCHARGE

## 2019-01-01 RX ORDER — VANCOMYCIN HCL 1 G
1000 VIAL (EA) INTRAVENOUS ONCE
Refills: 0 | Status: COMPLETED | OUTPATIENT
Start: 2019-01-01 | End: 2019-01-01

## 2019-01-01 RX ORDER — POTASSIUM CHLORIDE 20 MEQ
20 PACKET (EA) ORAL ONCE
Refills: 0 | Status: COMPLETED | OUTPATIENT
Start: 2019-01-01 | End: 2019-01-01

## 2019-01-01 RX ORDER — CHLORHEXIDINE GLUCONATE 213 G/1000ML
1 SOLUTION TOPICAL
Refills: 0 | Status: DISCONTINUED | OUTPATIENT
Start: 2019-01-01 | End: 2019-01-01

## 2019-01-01 RX ORDER — POTASSIUM CHLORIDE 20 MEQ
40 PACKET (EA) ORAL ONCE
Refills: 0 | Status: COMPLETED | OUTPATIENT
Start: 2019-01-01 | End: 2019-01-01

## 2019-01-01 RX ADMIN — Medication 40 MILLIGRAM(S): at 05:46

## 2019-01-01 RX ADMIN — Medication 0.25 MILLIGRAM(S): at 05:12

## 2019-01-01 RX ADMIN — Medication 40 MILLIGRAM(S): at 06:37

## 2019-01-01 RX ADMIN — Medication 180 MILLIGRAM(S): at 05:13

## 2019-01-01 RX ADMIN — BUDESONIDE AND FORMOTEROL FUMARATE DIHYDRATE 2 PUFF(S): 160; 4.5 AEROSOL RESPIRATORY (INHALATION) at 11:52

## 2019-01-01 RX ADMIN — PIPERACILLIN AND TAZOBACTAM 25 GRAM(S): 4; .5 INJECTION, POWDER, LYOPHILIZED, FOR SOLUTION INTRAVENOUS at 21:17

## 2019-01-01 RX ADMIN — Medication 75 MICROGRAM(S): at 06:05

## 2019-01-01 RX ADMIN — CHLORHEXIDINE GLUCONATE 15 MILLILITER(S): 213 SOLUTION TOPICAL at 05:58

## 2019-01-01 RX ADMIN — ENOXAPARIN SODIUM 40 MILLIGRAM(S): 100 INJECTION SUBCUTANEOUS at 05:45

## 2019-01-01 RX ADMIN — Medication 12.5 MILLIGRAM(S): at 06:05

## 2019-01-01 RX ADMIN — ONDANSETRON 4 MILLIGRAM(S): 8 TABLET, FILM COATED ORAL at 10:31

## 2019-01-01 RX ADMIN — Medication 3 MILLIGRAM(S): at 22:27

## 2019-01-01 RX ADMIN — ATORVASTATIN CALCIUM 80 MILLIGRAM(S): 80 TABLET, FILM COATED ORAL at 22:02

## 2019-01-01 RX ADMIN — Medication 180 MILLIGRAM(S): at 05:44

## 2019-01-01 RX ADMIN — PANTOPRAZOLE SODIUM 40 MILLIGRAM(S): 20 TABLET, DELAYED RELEASE ORAL at 06:37

## 2019-01-01 RX ADMIN — Medication 40 MILLIGRAM(S): at 00:47

## 2019-01-01 RX ADMIN — FENTANYL CITRATE 2.25 MICROGRAM(S)/KG/HR: 50 INJECTION INTRAVENOUS at 22:28

## 2019-01-01 RX ADMIN — BUDESONIDE AND FORMOTEROL FUMARATE DIHYDRATE 2 PUFF(S): 160; 4.5 AEROSOL RESPIRATORY (INHALATION) at 19:58

## 2019-01-01 RX ADMIN — PIPERACILLIN AND TAZOBACTAM 25 GRAM(S): 4; .5 INJECTION, POWDER, LYOPHILIZED, FOR SOLUTION INTRAVENOUS at 05:51

## 2019-01-01 RX ADMIN — Medication 0.25 MILLIGRAM(S): at 22:31

## 2019-01-01 RX ADMIN — LACTULOSE 20 GRAM(S): 10 SOLUTION ORAL at 16:00

## 2019-01-01 RX ADMIN — ENOXAPARIN SODIUM 40 MILLIGRAM(S): 100 INJECTION SUBCUTANEOUS at 15:10

## 2019-01-01 RX ADMIN — SODIUM CHLORIDE 250 MILLILITER(S): 9 INJECTION INTRAMUSCULAR; INTRAVENOUS; SUBCUTANEOUS at 21:46

## 2019-01-01 RX ADMIN — Medication 60 MILLIGRAM(S): at 05:59

## 2019-01-01 RX ADMIN — Medication 100 MILLIGRAM(S): at 14:50

## 2019-01-01 RX ADMIN — Medication 650 MILLIGRAM(S): at 18:18

## 2019-01-01 RX ADMIN — Medication 12.5 MILLIGRAM(S): at 17:45

## 2019-01-01 RX ADMIN — Medication 60 MILLIGRAM(S): at 17:42

## 2019-01-01 RX ADMIN — Medication 3 MILLILITER(S): at 07:42

## 2019-01-01 RX ADMIN — Medication 81 MILLIGRAM(S): at 11:17

## 2019-01-01 RX ADMIN — Medication 0.25 MILLIGRAM(S): at 21:30

## 2019-01-01 RX ADMIN — Medication 12.5 MILLIGRAM(S): at 06:11

## 2019-01-01 RX ADMIN — Medication 3 MILLILITER(S): at 13:52

## 2019-01-01 RX ADMIN — Medication 3 MILLILITER(S): at 16:02

## 2019-01-01 RX ADMIN — BUDESONIDE AND FORMOTEROL FUMARATE DIHYDRATE 2 PUFF(S): 160; 4.5 AEROSOL RESPIRATORY (INHALATION) at 22:27

## 2019-01-01 RX ADMIN — Medication 75 MICROGRAM(S): at 06:11

## 2019-01-01 RX ADMIN — Medication 40 MILLIGRAM(S): at 18:30

## 2019-01-01 RX ADMIN — Medication 0.25 MILLIGRAM(S): at 23:08

## 2019-01-01 RX ADMIN — ATORVASTATIN CALCIUM 80 MILLIGRAM(S): 80 TABLET, FILM COATED ORAL at 21:16

## 2019-01-01 RX ADMIN — Medication 3 MILLILITER(S): at 09:15

## 2019-01-01 RX ADMIN — Medication 12.5 MILLIGRAM(S): at 17:06

## 2019-01-01 RX ADMIN — Medication 40 MILLIGRAM(S): at 17:31

## 2019-01-01 RX ADMIN — ENOXAPARIN SODIUM 30 MILLIGRAM(S): 100 INJECTION SUBCUTANEOUS at 11:53

## 2019-01-01 RX ADMIN — Medication 81 MILLIGRAM(S): at 11:32

## 2019-01-01 RX ADMIN — Medication 650 MILLIGRAM(S): at 16:48

## 2019-01-01 RX ADMIN — Medication 3 MILLILITER(S): at 19:41

## 2019-01-01 RX ADMIN — Medication 81 MILLIGRAM(S): at 12:19

## 2019-01-01 RX ADMIN — CLOPIDOGREL BISULFATE 75 MILLIGRAM(S): 75 TABLET, FILM COATED ORAL at 11:18

## 2019-01-01 RX ADMIN — Medication 125 MILLIGRAM(S): at 20:40

## 2019-01-01 RX ADMIN — Medication 0.25 MILLIGRAM(S): at 22:27

## 2019-01-01 RX ADMIN — ALBUTEROL 2.5 MILLIGRAM(S): 90 AEROSOL, METERED ORAL at 20:45

## 2019-01-01 RX ADMIN — Medication 100 MILLIGRAM(S): at 05:13

## 2019-01-01 RX ADMIN — Medication 3 MILLILITER(S): at 13:39

## 2019-01-01 RX ADMIN — Medication 0.25 MILLIGRAM(S): at 06:02

## 2019-01-01 RX ADMIN — BUDESONIDE AND FORMOTEROL FUMARATE DIHYDRATE 2 PUFF(S): 160; 4.5 AEROSOL RESPIRATORY (INHALATION) at 08:10

## 2019-01-01 RX ADMIN — Medication 250 MILLIGRAM(S): at 12:24

## 2019-01-01 RX ADMIN — Medication 81 MILLIGRAM(S): at 12:33

## 2019-01-01 RX ADMIN — MIDAZOLAM HYDROCHLORIDE 0.99 MG/KG/HR: 1 INJECTION, SOLUTION INTRAMUSCULAR; INTRAVENOUS at 07:14

## 2019-01-01 RX ADMIN — CHLORHEXIDINE GLUCONATE 1 APPLICATION(S): 213 SOLUTION TOPICAL at 05:53

## 2019-01-01 RX ADMIN — Medication 3 MILLILITER(S): at 13:23

## 2019-01-01 RX ADMIN — PANTOPRAZOLE SODIUM 40 MILLIGRAM(S): 20 TABLET, DELAYED RELEASE ORAL at 11:54

## 2019-01-01 RX ADMIN — CLOPIDOGREL BISULFATE 75 MILLIGRAM(S): 75 TABLET, FILM COATED ORAL at 12:33

## 2019-01-01 RX ADMIN — Medication 40 MILLIGRAM(S): at 05:59

## 2019-01-01 RX ADMIN — LACTULOSE 20 GRAM(S): 10 SOLUTION ORAL at 11:49

## 2019-01-01 RX ADMIN — ALBUTEROL 2.5 MILLIGRAM(S): 90 AEROSOL, METERED ORAL at 21:00

## 2019-01-01 RX ADMIN — BUDESONIDE AND FORMOTEROL FUMARATE DIHYDRATE 2 PUFF(S): 160; 4.5 AEROSOL RESPIRATORY (INHALATION) at 08:00

## 2019-01-01 RX ADMIN — Medication 40 MILLIGRAM(S): at 06:11

## 2019-01-01 RX ADMIN — SODIUM CHLORIDE 50 MILLILITER(S): 9 INJECTION INTRAMUSCULAR; INTRAVENOUS; SUBCUTANEOUS at 02:00

## 2019-01-01 RX ADMIN — Medication 3 MILLILITER(S): at 20:38

## 2019-01-01 RX ADMIN — Medication 0.25 MILLIGRAM(S): at 17:19

## 2019-01-01 RX ADMIN — PANTOPRAZOLE SODIUM 40 MILLIGRAM(S): 20 TABLET, DELAYED RELEASE ORAL at 06:05

## 2019-01-01 RX ADMIN — Medication 125 MILLIGRAM(S): at 17:45

## 2019-01-01 RX ADMIN — Medication 40 MILLIGRAM(S): at 05:45

## 2019-01-01 RX ADMIN — MORPHINE SULFATE 2 MILLIGRAM(S): 50 CAPSULE, EXTENDED RELEASE ORAL at 16:12

## 2019-01-01 RX ADMIN — PANTOPRAZOLE SODIUM 40 MILLIGRAM(S): 20 TABLET, DELAYED RELEASE ORAL at 09:03

## 2019-01-01 RX ADMIN — Medication 100 MILLIGRAM(S): at 21:16

## 2019-01-01 RX ADMIN — Medication 100 MILLIGRAM(S): at 21:31

## 2019-01-01 RX ADMIN — Medication 3 MILLILITER(S): at 19:31

## 2019-01-01 RX ADMIN — Medication 70 MILLIGRAM(S): at 21:00

## 2019-01-01 RX ADMIN — POLYETHYLENE GLYCOL 3350 17 GRAM(S): 17 POWDER, FOR SOLUTION ORAL at 05:47

## 2019-01-01 RX ADMIN — Medication 3 MILLILITER(S): at 13:32

## 2019-01-01 RX ADMIN — PIPERACILLIN AND TAZOBACTAM 25 GRAM(S): 4; .5 INJECTION, POWDER, LYOPHILIZED, FOR SOLUTION INTRAVENOUS at 05:36

## 2019-01-01 RX ADMIN — Medication 650 MILLIGRAM(S): at 12:04

## 2019-01-01 RX ADMIN — ETOMIDATE 10 MILLIGRAM(S): 2 INJECTION INTRAVENOUS at 21:00

## 2019-01-01 RX ADMIN — ENOXAPARIN SODIUM 40 MILLIGRAM(S): 100 INJECTION SUBCUTANEOUS at 18:03

## 2019-01-01 RX ADMIN — Medication 75 MICROGRAM(S): at 05:13

## 2019-01-01 RX ADMIN — CHLORHEXIDINE GLUCONATE 1 APPLICATION(S): 213 SOLUTION TOPICAL at 05:46

## 2019-01-01 RX ADMIN — Medication 3 MILLILITER(S): at 20:53

## 2019-01-01 RX ADMIN — Medication 60 MILLIGRAM(S): at 05:09

## 2019-01-01 RX ADMIN — ATORVASTATIN CALCIUM 80 MILLIGRAM(S): 80 TABLET, FILM COATED ORAL at 22:17

## 2019-01-01 RX ADMIN — LACTULOSE 20 GRAM(S): 10 SOLUTION ORAL at 11:32

## 2019-01-01 RX ADMIN — Medication 0.25 MILLIGRAM(S): at 10:31

## 2019-01-01 RX ADMIN — Medication 0.25 MILLIGRAM(S): at 09:39

## 2019-01-01 RX ADMIN — Medication 81 MILLIGRAM(S): at 15:09

## 2019-01-01 RX ADMIN — CHLORHEXIDINE GLUCONATE 1 APPLICATION(S): 213 SOLUTION TOPICAL at 12:04

## 2019-01-01 RX ADMIN — SENNA PLUS 2 TABLET(S): 8.6 TABLET ORAL at 21:16

## 2019-01-01 RX ADMIN — ENOXAPARIN SODIUM 40 MILLIGRAM(S): 100 INJECTION SUBCUTANEOUS at 11:53

## 2019-01-01 RX ADMIN — CLOPIDOGREL BISULFATE 75 MILLIGRAM(S): 75 TABLET, FILM COATED ORAL at 12:19

## 2019-01-01 RX ADMIN — CLOPIDOGREL BISULFATE 75 MILLIGRAM(S): 75 TABLET, FILM COATED ORAL at 15:10

## 2019-01-01 RX ADMIN — MORPHINE SULFATE 1 MG/HR: 50 CAPSULE, EXTENDED RELEASE ORAL at 17:11

## 2019-01-01 RX ADMIN — PANTOPRAZOLE SODIUM 40 MILLIGRAM(S): 20 TABLET, DELAYED RELEASE ORAL at 06:14

## 2019-01-01 RX ADMIN — BUDESONIDE AND FORMOTEROL FUMARATE DIHYDRATE 2 PUFF(S): 160; 4.5 AEROSOL RESPIRATORY (INHALATION) at 21:50

## 2019-01-01 RX ADMIN — SODIUM CHLORIDE 50 MILLILITER(S): 9 INJECTION INTRAMUSCULAR; INTRAVENOUS; SUBCUTANEOUS at 07:15

## 2019-01-01 RX ADMIN — Medication 3 MILLILITER(S): at 13:42

## 2019-01-01 RX ADMIN — Medication 12.5 MILLIGRAM(S): at 05:52

## 2019-01-01 RX ADMIN — SODIUM CHLORIDE 1000 MILLILITER(S): 9 INJECTION INTRAMUSCULAR; INTRAVENOUS; SUBCUTANEOUS at 13:49

## 2019-01-01 RX ADMIN — CLOPIDOGREL BISULFATE 75 MILLIGRAM(S): 75 TABLET, FILM COATED ORAL at 11:38

## 2019-01-01 RX ADMIN — SODIUM CHLORIDE 250 MILLILITER(S): 9 INJECTION INTRAMUSCULAR; INTRAVENOUS; SUBCUTANEOUS at 21:15

## 2019-01-01 RX ADMIN — CEFEPIME 100 MILLIGRAM(S): 1 INJECTION, POWDER, FOR SOLUTION INTRAMUSCULAR; INTRAVENOUS at 11:17

## 2019-01-01 RX ADMIN — Medication 100 MILLIGRAM(S): at 14:10

## 2019-01-01 RX ADMIN — MORPHINE SULFATE 1 MG/HR: 50 CAPSULE, EXTENDED RELEASE ORAL at 17:10

## 2019-01-01 RX ADMIN — Medication 60 MILLIGRAM(S): at 05:12

## 2019-01-01 RX ADMIN — Medication 12.5 MILLIGRAM(S): at 05:46

## 2019-01-01 RX ADMIN — Medication 40 MILLIGRAM(S): at 11:16

## 2019-01-01 RX ADMIN — ENOXAPARIN SODIUM 40 MILLIGRAM(S): 100 INJECTION SUBCUTANEOUS at 12:33

## 2019-01-01 RX ADMIN — Medication 40 MILLIGRAM(S): at 18:31

## 2019-01-01 RX ADMIN — Medication 40 MILLIGRAM(S): at 17:06

## 2019-01-01 RX ADMIN — ATORVASTATIN CALCIUM 80 MILLIGRAM(S): 80 TABLET, FILM COATED ORAL at 22:27

## 2019-01-01 RX ADMIN — Medication 180 MILLIGRAM(S): at 06:05

## 2019-01-01 RX ADMIN — Medication 60 MILLIGRAM(S): at 22:46

## 2019-01-01 RX ADMIN — SODIUM CHLORIDE 50 MILLILITER(S): 9 INJECTION INTRAMUSCULAR; INTRAVENOUS; SUBCUTANEOUS at 00:15

## 2019-01-01 RX ADMIN — AZITHROMYCIN 250 MILLIGRAM(S): 500 TABLET, FILM COATED ORAL at 16:54

## 2019-01-01 RX ADMIN — Medication 12.5 MILLIGRAM(S): at 16:10

## 2019-01-01 RX ADMIN — CHLORHEXIDINE GLUCONATE 1 APPLICATION(S): 213 SOLUTION TOPICAL at 06:04

## 2019-01-01 RX ADMIN — Medication 180 MILLIGRAM(S): at 06:37

## 2019-01-01 RX ADMIN — Medication 75 MICROGRAM(S): at 05:52

## 2019-01-01 RX ADMIN — Medication 3 MILLILITER(S): at 07:47

## 2019-01-01 RX ADMIN — PIPERACILLIN AND TAZOBACTAM 25 GRAM(S): 4; .5 INJECTION, POWDER, LYOPHILIZED, FOR SOLUTION INTRAVENOUS at 22:12

## 2019-01-01 RX ADMIN — Medication 75 MICROGRAM(S): at 05:44

## 2019-01-01 RX ADMIN — Medication 40 MILLIGRAM(S): at 06:03

## 2019-01-01 RX ADMIN — Medication 60 MILLIGRAM(S): at 17:48

## 2019-01-01 RX ADMIN — Medication 3 MILLILITER(S): at 19:01

## 2019-01-01 RX ADMIN — CLOPIDOGREL BISULFATE 75 MILLIGRAM(S): 75 TABLET, FILM COATED ORAL at 17:31

## 2019-01-01 RX ADMIN — Medication 40 MILLIGRAM(S): at 18:19

## 2019-01-01 RX ADMIN — CHLORHEXIDINE GLUCONATE 15 MILLILITER(S): 213 SOLUTION TOPICAL at 17:31

## 2019-01-01 RX ADMIN — Medication 3 MILLILITER(S): at 19:45

## 2019-01-01 RX ADMIN — Medication 180 MILLIGRAM(S): at 05:46

## 2019-01-01 RX ADMIN — ENOXAPARIN SODIUM 40 MILLIGRAM(S): 100 INJECTION SUBCUTANEOUS at 05:53

## 2019-01-01 RX ADMIN — Medication 75 MICROGRAM(S): at 06:03

## 2019-01-01 RX ADMIN — Medication 3 MILLILITER(S): at 05:40

## 2019-01-01 RX ADMIN — CLOPIDOGREL BISULFATE 75 MILLIGRAM(S): 75 TABLET, FILM COATED ORAL at 11:53

## 2019-01-01 RX ADMIN — PANTOPRAZOLE SODIUM 40 MILLIGRAM(S): 20 TABLET, DELAYED RELEASE ORAL at 12:32

## 2019-01-01 RX ADMIN — Medication 0.25 MILLIGRAM(S): at 17:06

## 2019-01-01 RX ADMIN — Medication 3 MILLILITER(S): at 08:08

## 2019-01-01 RX ADMIN — Medication 40 MILLIGRAM(S): at 20:43

## 2019-01-01 RX ADMIN — MIDAZOLAM HYDROCHLORIDE 2 MILLIGRAM(S): 1 INJECTION, SOLUTION INTRAMUSCULAR; INTRAVENOUS at 09:12

## 2019-01-01 RX ADMIN — CEFEPIME 100 MILLIGRAM(S): 1 INJECTION, POWDER, FOR SOLUTION INTRAMUSCULAR; INTRAVENOUS at 22:56

## 2019-01-01 RX ADMIN — ENOXAPARIN SODIUM 40 MILLIGRAM(S): 100 INJECTION SUBCUTANEOUS at 11:38

## 2019-01-01 RX ADMIN — BUDESONIDE AND FORMOTEROL FUMARATE DIHYDRATE 2 PUFF(S): 160; 4.5 AEROSOL RESPIRATORY (INHALATION) at 20:11

## 2019-01-01 RX ADMIN — PANTOPRAZOLE SODIUM 40 MILLIGRAM(S): 20 TABLET, DELAYED RELEASE ORAL at 06:03

## 2019-01-01 RX ADMIN — Medication 3 MILLILITER(S): at 08:09

## 2019-01-01 RX ADMIN — Medication 50 MILLIEQUIVALENT(S): at 07:02

## 2019-01-01 RX ADMIN — FENTANYL CITRATE 50 MICROGRAM(S): 50 INJECTION INTRAVENOUS at 22:14

## 2019-01-01 RX ADMIN — POLYETHYLENE GLYCOL 3350 17 GRAM(S): 17 POWDER, FOR SOLUTION ORAL at 17:28

## 2019-01-01 RX ADMIN — SODIUM CHLORIDE 500 MILLILITER(S): 9 INJECTION INTRAMUSCULAR; INTRAVENOUS; SUBCUTANEOUS at 02:30

## 2019-01-01 RX ADMIN — ENOXAPARIN SODIUM 40 MILLIGRAM(S): 100 INJECTION SUBCUTANEOUS at 05:09

## 2019-01-01 RX ADMIN — Medication 81 MILLIGRAM(S): at 11:53

## 2019-01-01 RX ADMIN — ENOXAPARIN SODIUM 30 MILLIGRAM(S): 100 INJECTION SUBCUTANEOUS at 11:16

## 2019-01-01 RX ADMIN — BUDESONIDE AND FORMOTEROL FUMARATE DIHYDRATE 2 PUFF(S): 160; 4.5 AEROSOL RESPIRATORY (INHALATION) at 09:10

## 2019-01-01 RX ADMIN — ENOXAPARIN SODIUM 40 MILLIGRAM(S): 100 INJECTION SUBCUTANEOUS at 11:17

## 2019-01-01 RX ADMIN — Medication 81 MILLIGRAM(S): at 11:38

## 2019-01-01 RX ADMIN — Medication 40 MILLIGRAM(S): at 17:14

## 2019-01-01 RX ADMIN — BUDESONIDE AND FORMOTEROL FUMARATE DIHYDRATE 2 PUFF(S): 160; 4.5 AEROSOL RESPIRATORY (INHALATION) at 09:23

## 2019-01-01 RX ADMIN — BUDESONIDE AND FORMOTEROL FUMARATE DIHYDRATE 2 PUFF(S): 160; 4.5 AEROSOL RESPIRATORY (INHALATION) at 09:41

## 2019-01-01 RX ADMIN — Medication 100 MILLIGRAM(S): at 06:11

## 2019-01-01 RX ADMIN — Medication 60 MILLIGRAM(S): at 13:21

## 2019-01-01 RX ADMIN — Medication 37.5 MICROGRAM(S): at 23:39

## 2019-01-01 RX ADMIN — Medication 100 MILLIGRAM(S): at 06:05

## 2019-01-01 RX ADMIN — CLOPIDOGREL BISULFATE 75 MILLIGRAM(S): 75 TABLET, FILM COATED ORAL at 11:17

## 2019-01-01 RX ADMIN — BUDESONIDE AND FORMOTEROL FUMARATE DIHYDRATE 2 PUFF(S): 160; 4.5 AEROSOL RESPIRATORY (INHALATION) at 23:12

## 2019-01-01 RX ADMIN — Medication 3 MILLILITER(S): at 09:08

## 2019-01-01 RX ADMIN — BUDESONIDE AND FORMOTEROL FUMARATE DIHYDRATE 2 PUFF(S): 160; 4.5 AEROSOL RESPIRATORY (INHALATION) at 21:30

## 2019-01-01 RX ADMIN — MORPHINE SULFATE 2 MILLIGRAM(S): 50 CAPSULE, EXTENDED RELEASE ORAL at 09:21

## 2019-01-01 RX ADMIN — Medication 3 MILLILITER(S): at 14:19

## 2019-01-01 RX ADMIN — Medication 180 MILLIGRAM(S): at 06:02

## 2019-01-01 RX ADMIN — Medication 40 MILLIGRAM(S): at 12:32

## 2019-01-01 RX ADMIN — Medication 0.4 MILLIGRAM(S): at 18:19

## 2019-01-01 RX ADMIN — MORPHINE SULFATE 2 MILLIGRAM(S): 50 CAPSULE, EXTENDED RELEASE ORAL at 09:22

## 2019-01-01 RX ADMIN — Medication 12.5 MILLIGRAM(S): at 18:30

## 2019-01-01 RX ADMIN — MIDAZOLAM HYDROCHLORIDE 0.99 MG/KG/HR: 1 INJECTION, SOLUTION INTRAMUSCULAR; INTRAVENOUS at 05:57

## 2019-01-01 RX ADMIN — Medication 40 MILLIGRAM(S): at 05:53

## 2019-01-01 RX ADMIN — Medication 12.5 MILLIGRAM(S): at 17:14

## 2019-01-01 RX ADMIN — CLOPIDOGREL BISULFATE 75 MILLIGRAM(S): 75 TABLET, FILM COATED ORAL at 11:52

## 2019-01-01 RX ADMIN — Medication 600 MILLIGRAM(S): at 05:47

## 2019-01-01 RX ADMIN — BUDESONIDE AND FORMOTEROL FUMARATE DIHYDRATE 2 PUFF(S): 160; 4.5 AEROSOL RESPIRATORY (INHALATION) at 07:07

## 2019-01-01 RX ADMIN — CLOPIDOGREL BISULFATE 75 MILLIGRAM(S): 75 TABLET, FILM COATED ORAL at 11:32

## 2019-01-01 RX ADMIN — Medication 75 MICROGRAM(S): at 06:37

## 2019-01-01 RX ADMIN — AZITHROMYCIN 255 MILLIGRAM(S): 500 TABLET, FILM COATED ORAL at 05:11

## 2019-01-01 RX ADMIN — Medication 40 MILLIGRAM(S): at 05:32

## 2019-01-01 RX ADMIN — Medication 3 MILLILITER(S): at 19:59

## 2019-01-01 RX ADMIN — POLYETHYLENE GLYCOL 3350 17 GRAM(S): 17 POWDER, FOR SOLUTION ORAL at 17:14

## 2019-01-01 RX ADMIN — Medication 40 MILLIGRAM(S): at 05:44

## 2019-01-01 RX ADMIN — Medication 40 MILLIGRAM(S): at 23:40

## 2019-01-01 RX ADMIN — Medication 12.5 MILLIGRAM(S): at 06:37

## 2019-01-01 RX ADMIN — Medication 100 MILLIGRAM(S): at 23:08

## 2019-01-01 RX ADMIN — PIPERACILLIN AND TAZOBACTAM 25 GRAM(S): 4; .5 INJECTION, POWDER, LYOPHILIZED, FOR SOLUTION INTRAVENOUS at 14:49

## 2019-01-01 RX ADMIN — Medication 0.5 MILLIGRAM(S): at 12:18

## 2019-01-01 RX ADMIN — Medication 12.5 MILLIGRAM(S): at 18:04

## 2019-01-01 RX ADMIN — Medication 3 MILLILITER(S): at 08:55

## 2019-01-01 RX ADMIN — Medication 100 MILLIGRAM(S): at 16:02

## 2019-01-01 RX ADMIN — Medication 3 MILLILITER(S): at 18:00

## 2019-01-01 RX ADMIN — MIDAZOLAM HYDROCHLORIDE 0.99 MG/KG/HR: 1 INJECTION, SOLUTION INTRAMUSCULAR; INTRAVENOUS at 03:00

## 2019-01-01 RX ADMIN — Medication 180 MILLIGRAM(S): at 05:53

## 2019-01-01 RX ADMIN — Medication 250 MILLIGRAM(S): at 00:47

## 2019-01-01 RX ADMIN — Medication 12.5 MILLIGRAM(S): at 06:03

## 2019-01-01 RX ADMIN — ATORVASTATIN CALCIUM 80 MILLIGRAM(S): 80 TABLET, FILM COATED ORAL at 21:49

## 2019-01-01 RX ADMIN — PANTOPRAZOLE SODIUM 40 MILLIGRAM(S): 20 TABLET, DELAYED RELEASE ORAL at 05:52

## 2019-01-01 RX ADMIN — Medication 30 MILLILITER(S): at 19:05

## 2019-01-01 RX ADMIN — POLYETHYLENE GLYCOL 3350 17 GRAM(S): 17 POWDER, FOR SOLUTION ORAL at 20:55

## 2019-01-01 RX ADMIN — ENOXAPARIN SODIUM 40 MILLIGRAM(S): 100 INJECTION SUBCUTANEOUS at 18:31

## 2019-01-01 RX ADMIN — PANTOPRAZOLE SODIUM 40 MILLIGRAM(S): 20 TABLET, DELAYED RELEASE ORAL at 09:38

## 2019-01-01 RX ADMIN — Medication 40 MILLIGRAM(S): at 17:28

## 2019-01-01 RX ADMIN — Medication 81 MILLIGRAM(S): at 17:31

## 2019-01-01 RX ADMIN — Medication 3 MILLIGRAM(S): at 23:04

## 2019-01-01 RX ADMIN — Medication 40 MILLIEQUIVALENT(S): at 11:53

## 2019-01-01 RX ADMIN — ONDANSETRON 4 MILLIGRAM(S): 8 TABLET, FILM COATED ORAL at 15:26

## 2019-01-01 RX ADMIN — Medication 40 MILLIGRAM(S): at 18:02

## 2019-01-01 RX ADMIN — Medication 180 MILLIGRAM(S): at 06:11

## 2019-01-01 RX ADMIN — CHLORHEXIDINE GLUCONATE 1 APPLICATION(S): 213 SOLUTION TOPICAL at 05:58

## 2019-01-01 RX ADMIN — Medication 100 MILLIGRAM(S): at 05:53

## 2019-01-01 RX ADMIN — Medication 100 MILLIGRAM(S): at 15:09

## 2019-01-01 RX ADMIN — ONDANSETRON 4 MILLIGRAM(S): 8 TABLET, FILM COATED ORAL at 11:00

## 2019-01-01 RX ADMIN — PANTOPRAZOLE SODIUM 40 MILLIGRAM(S): 20 TABLET, DELAYED RELEASE ORAL at 06:11

## 2019-01-01 RX ADMIN — Medication 3 MILLILITER(S): at 07:34

## 2019-01-01 RX ADMIN — Medication 75 MICROGRAM(S): at 05:46

## 2019-01-01 RX ADMIN — Medication 60 MILLIGRAM(S): at 06:05

## 2019-01-01 RX ADMIN — Medication 81 MILLIGRAM(S): at 11:52

## 2019-01-01 RX ADMIN — Medication 12.5 MILLIGRAM(S): at 17:28

## 2019-01-01 RX ADMIN — Medication 12.5 MILLIGRAM(S): at 05:44

## 2019-01-01 RX ADMIN — Medication 81 MILLIGRAM(S): at 11:18

## 2019-01-01 RX ADMIN — POLYETHYLENE GLYCOL 3350 17 GRAM(S): 17 POWDER, FOR SOLUTION ORAL at 05:54

## 2019-01-01 RX ADMIN — CEFEPIME 100 MILLIGRAM(S): 1 INJECTION, POWDER, FOR SOLUTION INTRAMUSCULAR; INTRAVENOUS at 23:42

## 2019-01-01 RX ADMIN — Medication 0.5 MILLIGRAM(S): at 14:51

## 2019-01-01 RX ADMIN — CEFEPIME 100 MILLIGRAM(S): 1 INJECTION, POWDER, FOR SOLUTION INTRAMUSCULAR; INTRAVENOUS at 12:25

## 2019-01-01 RX ADMIN — Medication 600 MILLIGRAM(S): at 17:28

## 2019-01-01 RX ADMIN — Medication 650 MILLIGRAM(S): at 11:55

## 2019-01-01 RX ADMIN — CHLORHEXIDINE GLUCONATE 1 APPLICATION(S): 213 SOLUTION TOPICAL at 05:09

## 2019-08-29 NOTE — ED PROVIDER NOTE - PHYSICAL EXAMINATION
CONSTITUTIONAL: In pain.  SKIN: warm, dry  HEAD: Normocephalic; atraumatic.  EYES: PERRL, EOMI, no conjunctival erythema  ENT: No nasal discharge; airway clear.  NECK: Supple; non tender.  CARD: S1, S2 normal; no murmurs, gallops, or rubs. Regular rate and rhythm.   RESP: Increased work of breathing. Poor air movement.  ABD: soft Tenderness to palpation of the epigastric region.   EXT: Normal ROM.  No clubbing, cyanosis or edema.   LYMPH: No acute cervical adenopathy.  NEURO: Alert, oriented, grossly unremarkable  PSYCH: Cooperative, appropriate.

## 2019-08-29 NOTE — ED ADULT NURSE NOTE - OBJECTIVE STATEMENT
77 y/o female accompanied by daughter with complains of shortness of breath, per daughter she has hx of COPD and hasn't been taking her medications as ordered by her pcp. no n/v, no drh, no loc

## 2019-08-29 NOTE — ED PROVIDER NOTE - OBJECTIVE STATEMENT
78y F w/ PMH of COPD on 1.5L O2 PRN, CAD s/p stent and CABG, hypothyroidism, AAA s/p repair, HTN, HLD, PVD s/p fem-pop bypass, GERD, and anxiety presents with intermittent sharp chest pain with radiation to the back for the past 3 days. Has associated SOB. States symptoms have been getting worse. Went to her primary MD today and was sent over to ED for evaluation of CAD. Denies headache, fever, n/v/d, abd pain, or numbness/tingling.

## 2019-08-29 NOTE — H&P ADULT - NSHPPHYSICALEXAM_GEN_ALL_CORE
GEN: No acute distress  LUNGS: Clear to auscultation bilaterally   HEART: S1/S2 present. RRR.   ABD: Soft, non-tender, non-distended. Bowel sounds present  EXT: NC/NC/NE/2+PP/GILL/Skin Intact.   NEURO: AAOX3    Intravenous access:   NG tube:   Loja Catheter: GEN: No acute distress, NC/AT, anicteric, on BIPAP, speaks in full sentences  LUNGS: Decrease air entry bilaterally, mild wheezes heard in both lungs. No crackles at the base  HEART: S1/S2 present. RRR. Systolic murmur heard at the right parasternal border  ABD: Soft, epigastric tenderness, non-distended. Bowel sounds present  EXT: NC/NC/NE/2+PP/GILL/Skin Intact. No LE edema, left leg with papular rash   NEURO: AAOX3, moves all extremities, no focal deficits    Intravenous access: Peripheral access  NG tube:  None  Loja Catheter: None

## 2019-08-29 NOTE — ED ADULT NURSE NOTE - CHIEF COMPLAINT QUOTE
77 y/o female accompanied by daughter with complains of shortness of breath, per daughter she has hx of COPD and hasn't been taking her medications as ordered by her pcp. no n/v, no loc.

## 2019-08-29 NOTE — H&P ADULT - HISTORY OF PRESENT ILLNESS
78 y/o F with PMH of COPD on 1.5L O2 PRN (usually uses at night), CAD, MI (1991) s/p CABG, hypothyroidism 2/2 radioablation for hyperthyroidism, AAA s/p repair, HTN, HLD, PVD s/p fem-pop bypass, GERD, and anxiety presented for shortness of breath 76 y/o F with PMH of COPD on 2L O2 24/7, CAD, MI (1991) s/p CABG, hypothyroidism 2/2 radioablation for hyperthyroidism, AAA s/p repair, HTN, HLD, PVD s/p fem-pop bypass, GERD, and anxiety presented for shortness of breath. She says that the sob started 3 weeks ago and it has been progressing since. She was able to ambulate and did not increase her O2 but was using her inhalers more frequently up to 6 times a day. She visited her PMD for a routine blood work and everything was within normal limits except a high blood pressure of 200/90 (amlodipine 5mg was started). Today after doing groceries, patient felt a substernal chest pain, 10/10, radiating to right shoulder and left arm with a squeezing pain in her upper abdomen especially when she breathes, associated with nausea. This was associated with severe shortness of breath that limited the ambulation and prompted the visit to the ED. She has been having a dry cough and feels phlegm stuck in her throat and she cannot expectorate it.   In ED, Code STEMI was called which was later cancelled by cardiology. CXR showed hyperinflation of both lungs with a questionable right lower lobe opacity. Her SaO2 was 95% on 2L O2, ABG showed 78 y/o F with PMH of COPD on 2L O2 24/7, CAD, MI (1991) s/p CABG, hypothyroidism 2/2 radioablation for hyperthyroidism, AAA s/p repair, HTN, HLD, PVD s/p fem-pop bypass, GERD, and anxiety presented for shortness of breath. She says that the sob started 3 weeks ago and it has been progressing since. She was able to ambulate and did not increase her O2 but was using her inhalers more frequently up to 6 times a day. She visited her PMD for a routine blood work and everything was within normal limits except a high blood pressure of 200/90 (amlodipine 5mg was started). Today after doing groceries, patient felt a substernal chest pain, 10/10, radiating to right shoulder and left arm with a squeezing pain in her upper abdomen especially when she breathes, associated with nausea. This was associated with severe shortness of breath that limited the ambulation and prompted the visit to the ED. She has been having a dry cough and feels phlegm stuck in her throat and she cannot expectorate it.   In ED, Code STEMI was called which was later cancelled by cardiology. CXR showed hyperinflation of both lungs with a questionable right lower lobe opacity. Her SaO2 was 95% on 2L O2, VBG was unremarkable

## 2019-08-29 NOTE — H&P ADULT - NSHPLABSRESULTS_GEN_ALL_CORE
12.7   11.52 )-----------( 311      ( 29 Aug 2019 17:35 )             40.3       08-29    143  |  99  |  17  ----------------------------<  161<H>  4.9   |  31  |  0.7    Ca    10.4<H>      29 Aug 2019 17:35  Mg     2.3     08-29    TPro  7.7  /  Alb  5.1  /  TBili  0.5  /  DBili  x   /  AST  20  /  ALT  13  /  AlkPhos  64  08-29                      Lactate Trend      CARDIAC MARKERS ( 29 Aug 2019 17:35 )  x     / <0.01 ng/mL / x     / x     / x            CAPILLARY BLOOD GLUCOSE

## 2019-08-29 NOTE — H&P ADULT - NSICDXPASTSURGICALHX_GEN_ALL_CORE_FT
PAST SURGICAL HISTORY:  After cataract, bilateral R - 10/13  L - 8/14    H/O angioplasty R femoral    H/O: hysterectomy partial    History of cholecystectomy     History of femoropopliteal bypass     S/P CABG (coronary artery bypass graft)

## 2019-08-29 NOTE — ED PROVIDER NOTE - CARE PLAN
Principal Discharge DX:	COPD (chronic obstructive pulmonary disease)  Secondary Diagnosis:	PNA (pneumonia) Principal Discharge DX:	COPD (chronic obstructive pulmonary disease)  Secondary Diagnosis:	PNA (pneumonia)  Secondary Diagnosis:	Respiratory distress

## 2019-08-29 NOTE — H&P ADULT - NSHPSOCIALHISTORY_GEN_ALL_CORE
- Former smoker quit around 30 years ago  - Social drinker  - No illicit drug use  - Lives with daughter, fully independent at baseline

## 2019-08-29 NOTE — ED PROVIDER NOTE - PROGRESS NOTE DETAILS
Cardiology at bedside and STEMI code cancelled. Currently treating patient for respiratory distress. BiPAP placed on arrival

## 2019-08-29 NOTE — ED PROVIDER NOTE - ATTENDING CONTRIBUTION TO CARE
78f w a hx of HTN, HLD, PVD, Grave's/Hypothyroid, CAD/CABG, & COPD on O2. Pt presents w 3 days of progressive worsening dyspnea as well as epigastric/substernal chest pain. Pain is pressure-like, moderate, constant, no radiating, worse w walking/lying down/moving. No recent travel/hosp/immobilization.    Review of Systems  Constitutional:  No fever or chills.   Eyes:  Negative.   ENMT:  No nasal congestion, discharge, or throat pain.   Cardiac:  No palpitations, syncope, or edema. +Chest pressure.  Respiratory:  No wheezing or cough. No hemoptysis. +Dyspnea +Orthopnea  GI:  No vomiting, diarrhea, or abdominal pain. No melena or hematochezia.  :  No dysuria or hematuria.   Musculoskeletal:  No joint swelling, joint pain, or back pain.  Skin:  No skin rash, jaundice, or lesions.  Neuro:  No headache, loss of sensation, or focal weakness.  No change in mental status.    Physical Exam  General: Awake, alert, mild dist, WDWN, non-toxic appearing, NCAT  Eyes: PERRL, EOMI, no icterus, lids and conjunctivae are normal  ENT: External inspection normal, pink/moist membranes, pharynx normal  CV: S1S2, regular rate and rhythm, no murmur/gallops/rubs, no JVD, 2+ pulses b/l, no edema/cords/homans, warm/well-perfused  Respiratory: +Tachypnea, +increased effort, speaking partial sentences, lungs tight to auscultation b/l, supraclavicular retractions, no stridor  Abdomen: Soft abdomen, no tender/distended/guarding/rebound, no CVA tender  Musculoskeletal: FROM all 4 extremities, N/V intact  Neck: FROM neck, supple, no meningismus, trachea midline, no JVD  Integumentary: Color normal for race, warm and dry, no rash  Neuro: Oriented x3, CN 2-12 grossly intact, normal motor, normal sensory  Psych: Oriented x3, mood normal, affect normal     78f w dyspnea and epigastric/chest pain w resp distress. BiPAP started on arrival. Pt initially prenotification for STEMI. --Labs, EKG, resp support, Nebs, steroids, admit

## 2019-08-29 NOTE — ED PROVIDER NOTE - CLINICAL SUMMARY MEDICAL DECISION MAKING FREE TEXT BOX
78f w dyspnea and epigastric/chest pain w resp distress. BiPAP started on arrival. Pt initially prenotification for STEMI. Labs, EKG, & imaging reviewed. Pt seen by Cardiology and STEMI code cancelled.  Nebs, steroids, & Abx given w improvement in symptoms and resolution of chest pain. Patient admitted for further care and management.

## 2019-08-29 NOTE — H&P ADULT - NSHPREVIEWOFSYSTEMS_GEN_ALL_CORE
CONSTITUTIONAL: No weakness, fevers or chills  EYES/ENT: No visual changes;  No vertigo or throat pain   RESPIRATORY: No cough, wheezing, hemoptysis; No shortness of breath  CARDIOVASCULAR: No chest pain or palpitations  GASTROINTESTINAL: No abdominal or epigastric pain. No nausea, vomiting, or hematemesis; No diarrhea or constipation. No melena or hematochezia.  GENITOURINARY: No dysuria, frequency or hematuria  NEUROLOGICAL: No numbness or weakness  SKIN: No itching, rashes CONSTITUTIONAL: No weakness, fevers or chills  EYES/ENT: No visual changes;  No vertigo or throat pain   RESPIRATORY: SOB  CARDIOVASCULAR: pleuritic Chest pain, no palpitations  GASTROINTESTINAL: Epigastric pain, increases with inspiration  GENITOURINARY: No dysuria, frequency or hematuria  NEUROLOGICAL: No numbness or weakness  SKIN: No itching, rashes

## 2019-08-29 NOTE — H&P ADULT - ATTENDING COMMENTS
Patient seen and examined independently. Agree with resident note/ history / physical exam and plan of care with following exceptions/additions/updates. Case discussed with house-staff, nursing and patient/pt decision maker.     still sob. no cp.   has not been feeling well for about a month.   no fever no chills  Vital Signs Last 24 Hrs  T(C): 36.5 (30 Aug 2019 09:03), Max: 36.5 (30 Aug 2019 09:03)  T(F): 97.7 (30 Aug 2019 09:03), Max: 97.7 (30 Aug 2019 09:03)  HR: 78 (30 Aug 2019 09:03) (77 - 96)  BP: 135/62 (30 Aug 2019 09:03) (135/62 - 170/68)  RR: 20 (30 Aug 2019 09:03) (20 - 20)  SpO2: 100% (30 Aug 2019 09:03) (95% - 100%)  Physical exam:   constitutional NAD, AAOX3, Respiratory  lungs CTA, CVS heart RRR, GI: abdomen Soft NT, ND, BS+, skin: intact  neuro exam non focal. was in mod resp distress in am but improved now.   CARDIAC MARKERS ( 30 Aug 2019 05:36 )  x     / 0.25 ng/mL / x     / x     / x      CARDIAC MARKERS ( 30 Aug 2019 00:21 )  x     / 0.19 ng/mL / x     / x     / x      CARDIAC MARKERS ( 29 Aug 2019 17:35 )  x     / <0.01 ng/mL / x     / x     / x        < from: 12 Lead ECG (08.30.19 @ 09:05) >    sinus rhythmwith sinus arrhythmia  Rightward axis  Pulmonary disease pattern  ST & T wave abnormality, consider anterolateral ischemia  Prolonged QT  Abnormal EC    < end of copied text >    a/p  nstemi, cont meds as per cardio. medical management for now.   copd, cont meds per pulm,     PAST MEDICAL & SURGICAL HISTORY:  History of myocardial infarction  Anxiety  Gastroesophageal reflux disease without esophagitis  Essential hypertension  Abdominal aortic aneurysm (AAA) without rupture  Chronic obstructive pulmonary disease with acute exacerbation  Peripheral vascular disease  Coronary artery disease involving native coronary artery of native heart without angina pectoris  Postablative hypothyroidism  Graves disease  MI (myocardial infarction)  S/P CABG (coronary artery bypass graft)  History of femoropopliteal bypass  After cataract, bilateral: R - 10/13  L - 8/14  H/O angioplasty: R femoral  H/O: hysterectomy: partial  History of cholecystectomy    #Progress Note Handoff  Pending (specify):  Consults cardio and pulm fu.   Family discussion: dw pt , completely oriented and alert. full code. but wants to think about intubation and she will let us know.   Disposition: Home

## 2019-08-29 NOTE — H&P ADULT - NSICDXPASTMEDICALHX_GEN_ALL_CORE_FT
PAST MEDICAL HISTORY:  Abdominal aortic aneurysm (AAA) without rupture     Anxiety     Chronic obstructive pulmonary disease with acute exacerbation     Coronary artery disease involving native coronary artery of native heart without angina pectoris     Essential hypertension     Gastroesophageal reflux disease without esophagitis     Graves disease     History of myocardial infarction     MI (myocardial infarction)     Peripheral vascular disease     Postablative hypothyroidism

## 2019-08-29 NOTE — H&P ADULT - ASSESSMENT
76 y/o F with PMH of COPD on 1.5L O2 PRN (usually uses at night), CAD, MI (1991) s/p CABG, hypothyroidism 2/2 radioablation for hyperthyroidism, AAA s/p repair, HTN, HLD, PVD s/p fem-pop bypass, GERD, and anxiety presented for shortness of breath.     # Acute exacerbation of chronic obstructive pulmonary disease:  - s/p 125mg solumedrol in the ED with improvement.  - Continue solumedrol Q12h.  - Continue nebulizers and symbicort.  - Wean oxygen support as tolerated.  - Consider pulmonary consult if patient does not improve with current therapy  - No symptoms or signs of infection. Will hold antibiotics at this time.    # Anxiety:  - Continue xanax    # Hypothyroidism:  - Continue levothyroxine    # Hypertension:  - Continue dilitazem    # GI ppx: Protonix 40 mg PO qd  # DVT ppx: Lovenox 40 mg qd subq  # Activity: OOBTC  # Disposition: From home  # Code status: Full Code. 78 y/o F with PMH of COPD on 1.5L O2 PRN (usually uses at night), CAD, MI (1991) s/p CABG, hypothyroidism 2/2 radioablation for hyperthyroidism, AAA s/p repair, HTN, HLD, PVD s/p fem-pop bypass, GERD, and anxiety admitted for COPD exacerbation    # Acute exacerbation of chronic obstructive pulmonary disease:  - s/p 125mg solumedrol in the ED with improvement and levaquin 750 mg in ED  - Continue with BIPAP overnight, d/c in am for breakfast and am medications  - Continue solumedrol 60 mg IV Q12h and azithromycin 500 mg qd IV for 5 total days. Can switch to PO azithromycin tomorrow  - Patient will need a long steroid taper, switch to prednisone PO when indicated.  - HOB at 45 degrees, aspiration precautions  - Continue Duoneb q6h and PRN; continue with symbicort q12h  - Goal is to wean off bipap and bring patient back to baseline (2L O2) with no desaturation at ambulation  - F/up pulm consult  - F/up CT chest with and without IV contrast ( ruling out PE which is not uncommon in severe COPD exacerbation, and checking lung nodules)  - PEAK flow and ABG in am  - Patient mentions that she had SOB after she ate --> F/up S&S to rule out underlying dysphagia. Possible pneumonitis in the right lower lobe.    # Chest pain:  - Pleuritic in nature, intially Code STEMI but was cancelled  - Trend troponin and EKG  - Get 2D echo  - F/up cardio recs ( [place])    # Macrocytosis with hx of hypothyroidism:  - F/up TSH    # Anxiety:  - Continue xanax 0.25 mg PO q12h    # Hypothyroidism:  - Continue levothyroxine 75 mcg qd    # Hypertension:  - Continue dilitazem 180 qd    # Diet: Npo except meds now, pending further evaluation by speech  # GI ppx: Protonix 40 mg PO qd  # DVT ppx: Lovenox 40 mg qd subq  # Activity: OOBTC  # Disposition: From home  # Code status: Full Code.

## 2019-08-29 NOTE — CHART NOTE - NSCHARTNOTEFT_GEN_A_CORE
Code STEMI was called overhead, and I was paged. I responded by physically arriving and examining the pt. at the bedside. Pt. is a 77 YO F with PMH of HTN, CAD s/p CABG in 1992, and PCI in 2011, PAD s/p PCI and bypass of b/l LE, COPD, exsmoker, p/w worsening sob, cough, and chest pain. EKG showed sinus rhythm @96bpm, LVH, and Right axis deviation. No change from before. No ST elevations were seen. Rhonchi and decreased breath sounds on exam, increased use of bronchodilators lately. Case was discussed with Interventional Cardiologist on call (Dr. Min) and STEMI was cancelled. Symptoms suggestive of COPD exacerbation.    EKG showed sinus rhythm @96bpm, LVH, and Right axis deviation Code STEMI was called overhead, and I was paged. I responded by physically arriving and examining the pt. at the bedside. Pt. is a 79 YO F with PMH of HTN, CAD s/p CABG in 1992, and PCI in 2011, PAD s/p PCI and bypass of b/l LE, COPD, exsmoker, p/w worsening sob, cough, and chest pain. EKG showed sinus rhythm @96bpm, LVH, and Right axis deviation. No change from before. No ST elevations were seen. Rhonchi and decreased breath sounds on exam, increased use of bronchodilators lately. Case was discussed with Interventional Cardiologist on call (Dr. Min) and STEMI was cancelled. Symptoms suggestive of COPD exacerbation.    EKG showed sinus rhythm @96bpm, LVH, and Right axis deviation      Attending:    Patient seen and examined.  D/w the cardiology fellow and ED physician.  No evidence of STEMI  COPD exacerbation  R/o ACS    Rec: No indication for urgent cath.  Admit to monitored bed.   Supplemental O2, steroids, bronchodilators for COPD  c/w antiplatelet therapy and medical management of CAD.  Serial enzymes, ECG  2D echo.  Patient will be seen by her private cardiologist.

## 2019-08-30 NOTE — CONSULT NOTE ADULT - ASSESSMENT
history  CAD CABG   MI  PVD COPD with SOB      Chuck  elevated  copd Exac      cont meds     ASA PLAVIX  statin         serial   ECG   enzymes     echo

## 2019-08-30 NOTE — CHART NOTE - NSCHARTNOTEFT_GEN_A_CORE
Received call from stat lab for troponin 0.19, was <0.01 on admission. Went to bedside to evaluate patient. Woke patient from sleep, resting comfortably in bed, denied chest pain. EKG ordered, stable from admission. Cardiac fellow called, started Plavix. Received call from stat lab for troponin 0.19, was <0.01 on admission. Went to bedside to evaluate patient. Woke patient from sleep, resting comfortably in bed, denied chest pain. EKG ordered, stable from admission. Cardiac fellow called, recommendations to continue current management and start Plavix.

## 2019-08-30 NOTE — SWALLOW BEDSIDE ASSESSMENT ADULT - COMMENTS
pt reported hx of esophageal swallowing deficits +toleration w/o overt s/s penetration/aspiration w/ puree +toleration w/o overt s/s penetration/aspiration w/ solids

## 2019-08-30 NOTE — PROGRESS NOTE ADULT - ASSESSMENT
78 y/o F with PMH of COPD on 1.5L O2 PRN (usually uses at night), CAD, MI (1991) s/p CABG, hypothyroidism 2/2 radioablation for hyperthyroidism, AAA s/p repair, HTN, HLD, PVD s/p fem-pop bypass, GERD, and anxiety admitted for COPD exacerbation    # Acute exacerbation of chronic obstructive pulmonary disease  - CXR showing hyperinflation of both lungs with a questionable right lower lobe opacity.   - s/p 125mg solumedrol in the ED with improvement and levaquin 750 mg in ED  - Continue with BIPAP q4hrs on/off  - Continue solumedrol 60 mg IV Q12h and azithromycin 500 mg qd IV for 5 total days. Patient will need a long steroid taper, switch to prednisone PO when indicated.  - HOB at 45 degrees, aspiration precautions  - Continue Duoneb q6h and PRN; continue with symbicort q12h  - F/u pulm consult    # Elevated cardiac enzymes - likely NSTEMI   - intially Code STEMI but was cancelled  - Trend troponin and EKG  - evaluated by cardiology, Dr Polk  - f/u 2D echo    # Macrocytosis with hx of hypothyroidism  - F/up TSH    # Anxiety:  - Continue xanax 0.25 mg PO q12h    # Hypothyroidism:  - Continue levothyroxine 75 mcg qd    # Hypertension:  - Continue dilitazem 180 qd    Diet: Npo for now due to mild respiratory distress  GI ppx: Protonix 40 mg PO qd  DVT ppx: Lovenox 40 mg qd subq  Activity: OOBTC  Disposition: From home  FULL CODE for now. Patient is unsure if she would want to be intubated if her condition were to deteriorate. Would like more time to think about her decision.   # Code status: Full Code.

## 2019-08-30 NOTE — SWALLOW BEDSIDE ASSESSMENT ADULT - SWALLOW EVAL: DIAGNOSIS
+toleration for thin liquids, puree and solids via small bites/sips w/o overt s/s penetration/aspiration

## 2019-08-30 NOTE — SWALLOW BEDSIDE ASSESSMENT ADULT - SWALLOW EVAL: FUNCTIONAL LEVEL AT TIME OF EVAL
+dyspnea at rest, during conversation and during PO intake. RN aware. Pt currently w/ orders for bipap Q4hrs on Q4hrs off.

## 2019-08-30 NOTE — SWALLOW BEDSIDE ASSESSMENT ADULT - SWALLOW EVAL: RECOMMENDED DIET
regular solids w/ thin liquids, as long as patient's respiratory status is stable and is not on bipap

## 2019-08-30 NOTE — CONSULT NOTE ADULT - ASSESSMENT
IMPRESSION: Rehab of debility, COPD, pulm nodule, NSTEMI    PRECAUTIONS: [x  ] Cardiac  [ x ] Respiratory  [  ] Seizures [  ] Contact Isolation  [  ] Droplet Isolation  [  ] Other    Weight Bearing Status:     RECOMMENDATION:   Pursed lip breathing with amb. He has some reflux symptoms; she is on Protonix.    Out of Bed to Chair     DVT/Decubiti Prophylaxis    REHAB PLAN:     [xx   ] Bedside P/T 3-5 times a week   [   ]   Bedside O/T  2-3 times a week             [   ] No Rehab Therapy Indicated                   [   ]  Speech Therapy   Conditioning/ROM                                    ADL  Bed Mobility                                               Conditioning/ROM  Transfers                                                     Bed Mobility  Sitting /Standing Balance                         Transfers                                        Gait Training                                               Sitting/Standing Balance  Stair Training [   ]Applicable                    Home equipment Eval                                                                        Splinting  [   ] Only      GOALS:   ADL   [ x  ]   Independent                    Transfers  [ x  ] Independent                          Ambulation  [ x  ] Independent     [ x   ] With device                            [   ]  CG                                                         [   ]  CG                                                                  [   ] CG                            [    ] Min A                                                   [   ] Min A                                                              [   ] Min  A          DISCHARGE PLAN:   [   ]  Good candidate for Intensive Rehabilitation/Hospital based-4A [facility]                                             Will tolerate 3hrs Intensive Rehab Daily                                       [xx    ]  Short Term Rehab in Skilled Nursing Facility                                                                VS                                     [ xx   ]  Home with Outpatient or VN services                                         [    ]  Possible Candidate for Intensive Hospital based Rehab

## 2019-08-30 NOTE — CONSULT NOTE ADULT - ASSESSMENT
Impression  Acute on Chronic Hypercapnic Respiratory Failure  COPD  Pulmonary Nodule 6mm  NSTEMI  Hypothyroidism  HTN    Recommendations  -Bipap Q4 on and off PRN  - Continue Duonebs , IV steroids , Azithromycin  - CT chest negative  -Repeat ABG, Chest xray , LE duplex  - cardio f/u for NSTEMI  -f/u out pt for Pulm nodule  -Poor prognosis   - Advance directives  -Will follow

## 2019-08-30 NOTE — PROGRESS NOTE ADULT - SUBJECTIVE AND OBJECTIVE BOX
SUBJECTIVE:    Patient is a 77 y/o Female who presents with a chief complaint of SOB (30 Aug 2019 09:30)    Currently admitted to medicine with the primary diagnosis of acute exacerbation of COPD (chronic obstructive pulmonary disease)     Today is hospital day 1. Patient was seen and examined at bedside. This morning she is resting in bed and reports persistent shortness of breath.     PAST MEDICAL & SURGICAL HISTORY  PAST MEDICAL & SURGICAL HISTORY:  History of myocardial infarction  Anxiety  Gastroesophageal reflux disease without esophagitis  Essential hypertension  Abdominal aortic aneurysm (AAA) without rupture  Chronic obstructive pulmonary disease with acute exacerbation  Peripheral vascular disease  Coronary artery disease involving native coronary artery of native heart without angina pectoris  Postablative hypothyroidism  Graves disease  MI (myocardial infarction)  S/P CABG (coronary artery bypass graft)  History of femoropopliteal bypass  After cataract, bilateral: R - 10/13  L - 8/14  H/O angioplasty: R femoral  H/O: hysterectomy: partial  History of cholecystectomy    SOCIAL HISTORY:  - Former smoker quit around 30 years ago  - Social drinker  - No illicit drug use  - Lives with daughter, fully independent at baseline. Has home oxygen.    ALLERGIES:  No Known Allergies    MEDICATIONS:  STANDING MEDICATIONS  ALBUTerol/ipratropium for Nebulization 3 milliLiter(s) Nebulizer every 6 hours  ALPRAZolam 0.25 milliGRAM(s) Oral two times a day  aspirin enteric coated 81 milliGRAM(s) Oral daily  azithromycin  IVPB 500 milliGRAM(s) IV Intermittent every 24 hours  buDESOnide 160 MICROgram(s)/formoterol 4.5 MICROgram(s) Inhaler 2 Puff(s) Inhalation two times a day  chlorhexidine 4% Liquid 1 Application(s) Topical <User Schedule>  clopidogrel Tablet 75 milliGRAM(s) Oral daily  diltiazem    milliGRAM(s) Oral daily  docusate sodium 100 milliGRAM(s) Oral three times a day  enoxaparin Injectable 40 milliGRAM(s) SubCutaneous daily  levothyroxine 75 MICROGram(s) Oral daily  methylPREDNISolone sodium succinate Injectable 60 milliGRAM(s) IV Push every 12 hours  pantoprazole    Tablet 40 milliGRAM(s) Oral before breakfast    PRN MEDICATIONS  ALBUTerol    90 MICROgram(s) HFA Inhaler 2 Puff(s) Inhalation every 4 hours PRN    VITALS:   T(F): 97.7  HR: 78  BP: 135/62  RR: 20  SpO2: 100%    LABS:                        11.6   6.21  )-----------( 250      ( 30 Aug 2019 05:36 )             36.4     08-30    141  |  102  |  15  ----------------------------<  167<H>  4.9   |  24  |  0.6<L>    Ca    9.4      30 Aug 2019 05:36  Mg     2.3     08-30    TPro  7.7  /  Alb  5.1  /  TBili  0.5  /  DBili  x   /  AST  20  /  ALT  13  /  AlkPhos  64  08-29          Troponin T, Serum: 0.25 ng/mL <HH> (08-30-19 @ 05:36)  Troponin T, Serum: 0.19 ng/mL <HH> (08-30-19 @ 00:21)  Troponin T, Serum: <0.01 ng/mL (08-29-19 @ 17:35)      CARDIAC MARKERS ( 30 Aug 2019 05:36 )  x     / 0.25 ng/mL / x     / x     / x      CARDIAC MARKERS ( 30 Aug 2019 00:21 )  x     / 0.19 ng/mL / x     / x     / x      CARDIAC MARKERS ( 29 Aug 2019 17:35 )  x     / <0.01 ng/mL / x     / x     / x          RADIOLOGY:  < from: CT Angio Chest w/ IV Cont (08.29.19 @ 22:39) >      IMPRESSION:    1.  No pulmonary embolism.    2.  New bilateral lower lobe dependent groundglassand nodular opacities,   likely infectious or inflammatory in etiology.    3.  Unchanged 6 mm spiculated paramedial right lower lobe nodule. A   follow-up CT chest is recommended in 6 months.    4.  Redemonstrated severe pulmonary emphysema.        < end of copied text >    < from: CT Chest No Cont (05.21.19 @ 09:00) >    IMPRESSION:      New suspicious 6 mm paramedial right lower lobe nodule. CT of the chest   in 3 months is recommended for follow-up.        < end of copied text >      PHYSICAL EXAM:  GENERAL: Elderly female. Frail; speaks in full sentences  HEAD: Atraumatic  NECK: Supple  CHEST/LUNG: Air entry bilaterally; decreased breath sounds throughout; No wheeze or crackles; barrel chest  HEART: S1, S2; RRR; No murmurs, rubs, or gallops  ABDOMEN: BS+; Soft, Non-tender, Non-distended  EXTREMITIES:  2+ Peripheral Pulses, No clubbing, cyanosis, or edema  PSYCH: AAOx3  NEUROLOGY: non-focal  SKIN: No rashes or lesions

## 2019-08-30 NOTE — SWALLOW BEDSIDE ASSESSMENT ADULT - ASR SWALLOW ASPIRATION MONITOR
cough/gurgly voice/change of breathing pattern/position upright (90Y)/fever/pneumonia/throat clearing/oral hygiene/upper respiratory infection

## 2019-08-30 NOTE — SWALLOW BEDSIDE ASSESSMENT ADULT - SLP PERTINENT HISTORY OF CURRENT PROBLEM
PMH of COPD on 2L O2 24/7, CAD, MI (1991) s/p CABG, hypothyroidism 2/2 radioablation for hyperthyroidism, AAA s/p repair, HTN, HLD, PVD s/p fem-pop bypass, GERD, and anxiety presented to ED for shortness of breath. PMH of COPD on 2L O2 24/7, CAD, MI (1991) s/p CABG, hypothyroidism 2/2 radioablation for hyperthyroidism, AAA s/p repair, HTN, HLD, PVD s/p fem-pop bypass, GERD, and anxiety presented to ED for shortness of breath. CXR showed hyperinflation of both lungs with a questionable right lower lobe opacity.

## 2019-08-31 NOTE — PROGRESS NOTE ADULT - SUBJECTIVE AND OBJECTIVE BOX
INTERVAL HISTORY: No overnight events.  	  MEDICATIONS:  ALBUTerol    90 MICROgram(s) HFA Inhaler 2 Puff(s) Inhalation every 4 hours PRN  ALBUTerol/ipratropium for Nebulization 3 milliLiter(s) Nebulizer every 6 hours  aspirin enteric coated 81 milliGRAM(s) Oral daily  buDESOnide 160 MICROgram(s)/formoterol 4.5 MICROgram(s) Inhaler 2 Puff(s) Inhalation two times a day  chlorhexidine 4% Liquid 1 Application(s) Topical <User Schedule>  clopidogrel Tablet 75 milliGRAM(s) Oral daily  diltiazem    milliGRAM(s) Oral daily  docusate sodium 100 milliGRAM(s) Oral three times a day  enoxaparin Injectable 40 milliGRAM(s) SubCutaneous daily  levothyroxine 75 MICROGram(s) Oral daily  methylPREDNISolone sodium succinate Injectable 60 milliGRAM(s) IV Push every 12 hours  metoprolol tartrate 12.5 milliGRAM(s) Oral two times a day  ondansetron Injectable 4 milliGRAM(s) IV Push every 6 hours PRN  pantoprazole    Tablet 40 milliGRAM(s) Oral before breakfast      REVIEW OF SYSTEMS:  CONSTITUTIONAL: No fever, weight loss, or fatigue  NECK: No pain or stiffness  RESPIRATORY: No cough, wheezing, chills or hemoptysis; No shortness of breath  CARDIOVASCULAR: No chest pain, palpitations, dizziness, or leg swelling  GASTROINTESTINAL: No abdominal or epigastric pain. No nausea, vomiting, or hematemesis; No diarrhea or constipation. No melena or hematochezia.  GENITOURINARY: No dysuria, frequency, hematuria, or incontinence  NEUROLOGICAL: No headaches, memory loss, loss of strength, numbness, or tremors  SKIN: No itching, burning, rashes, or lesions   ENDOCRINE: No heat or cold intolerance; No hair loss  MUSCULOSKELETAL: No joint pain or swelling; No muscle, back, or extremity pain  HEME/LYMPH: No easy bruising, or bleeding gums    PHYSICAL EXAM:  T(C): 36.3 (08-31-19 @ 05:36), Max: 37.1 (08-30-19 @ 19:07)  HR: 75 (08-31-19 @ 05:36) (74 - 87)  BP: 137/67 (08-31-19 @ 05:36) (130/58 - 137/67)  RR: 18 (08-31-19 @ 05:36) (18 - 20)  SpO2: 99% (08-30-19 @ 15:37) (99% - 99%)  Wt(kg): --  I&O's Summary    31 Aug 2019 07:01  -  31 Aug 2019 11:44  --------------------------------------------------------  IN: 200 mL / OUT: 0 mL / NET: 200 mL      Height (cm): 152.4 (08-30 @ 19:07)  Weight (kg): 46.2 (08-30 @ 19:07)  BMI (kg/m2): 19.9 (08-30 @ 19:07)  BSA (m2): 1.4 (08-30 @ 19:07)    GENERAL: NAD, well-groomed, well-developed  HEAD:  Atraumatic, Normocephalic  NECK: Supple, No JVD, Normal thyroid  NERVOUS SYSTEM:  Alert & Oriented X3, Good concentration  CHEST/LUNG: Clear to percussion bilaterally; No rales, rhonchi, wheezing, or rubs  HEART: Regular rate and rhythm; No murmurs, rubs, or gallops  ABDOMEN: Soft, Nontender, Nondistended; Bowel sounds present  EXTREMITIES:  2+ Peripheral Pulses, No clubbing, cyanosis, or edema  SKIN: No rashes or lesions    LABS:	 	    CARDIAC MARKERS ( 31 Aug 2019 07:09 )  x     / 0.23 ng/mL / 184 U/L / x     / 15.9 ng/mL  CARDIAC MARKERS ( 30 Aug 2019 16:38 )  x     / 0.26 ng/mL / x     / x     / x      CARDIAC MARKERS ( 30 Aug 2019 05:36 )  x     / 0.25 ng/mL / x     / x     / x      CARDIAC MARKERS ( 30 Aug 2019 00:21 )  x     / 0.19 ng/mL / x     / x     / x      CARDIAC MARKERS ( 29 Aug 2019 17:35 )  x     / <0.01 ng/mL / x     / x     / x                                11.7   10.44 )-----------( 268      ( 31 Aug 2019 07:09 )             36.9     08-31    144  |  103  |  16  ----------------------------<  139<H>  5.4<H>   |  32  |  0.6<L>    Ca    9.7      31 Aug 2019 07:09  Mg     2.1     08-31    TPro  6.6  /  Alb  4.5  /  TBili  0.6  /  DBili  x   /  AST  46<H>  /  ALT  21  /  AlkPhos  51  08-31    proBNP:   Lipid Profile:

## 2019-08-31 NOTE — PROGRESS NOTE ADULT - ASSESSMENT
[address] 
[phone] Patient: Gavin Morgan MRN: [id number] Visit Information Date & Time Provider Department Dept. Phone Encounter #  
 3/16/2018  1:15 PM Nain Monroe, [facility] [id number] Follow-up Instructions Return in about 6 months (around 9/16/2018) for routine care. Upcoming Health Maintenance Date Due Influenza Age 5 to Adult 8/1/2017 Pneumococcal 19-64 Highest Risk (2 of 3 - PCV13) 7/18/2018 DTaP/Tdap/Td series (2 - Td) 7/6/2025 Allergies as of 3/16/2018  Review Complete On: 3/16/2018 By: Nain Monroe MD  
  
 Severity Noted Reaction Type Reactions Ativan [Lorazepam]  01/26/2016    Other (comments) Suicidal thoughts Current Immunizations  Reviewed on 3/16/2018 Name Date Tdap 7/6/2015 Reviewed by Graham Gamble on 3/16/2018 at  1:06 PM  
You Were Diagnosed With   
  
 Codes Comments Essential hypertension    -  Primary ICD-10-CM: I10 
ICD-9-CM: 401.9 Gastroesophageal reflux disease, esophagitis presence not specified     ICD-10-CM: K21.9 ICD-9-CM: 530.81 Allergic rhinitis, unspecified chronicity, unspecified seasonality, unspecified trigger     ICD-10-CM: J30.9 ICD-9-CM: 477.9 Acquired hypothyroidism     ICD-10-CM: E03.9 ICD-9-CM: 244.9 Multiple myeloma not having achieved remission ([facility]     ICD-10-CM: C90.00 ICD-9-CM: 203.00 Body mass index (BMI) of 25.0 to 29.9     ICD-10-CM: E66.3 ICD-9-CM: 278.02 Vitals BP Pulse Temp Resp Height(growth percentile) Weight(growth percentile) 116/74 74 98.7 °F (37.1 °C) (Oral) 16 6' (1.829 m) 218 lb (98.9 kg) SpO2 BMI Smoking Status 98% 29.57 kg/m2 Former Smoker Vitals History BMI and BSA Data Body Mass Index Body Surface Area  
 29.57 kg/m 2 2.24 m 2 Preferred Pharmacy Pharmacy Name Phone [address] [facility] [phone] Your Updated Medication List  
  
   
This list is accurate as of 3/16/18  1:37 PM.  Always use your most recent med list.  
  
  
  
  
 acyclovir 400 mg tablet Commonly known as:  ZOVIRAX Take 400 mg by mouth two (2) times a day. albuterol 90 mcg/actuation inhaler Commonly known as:  PROVENTIL HFA, VENTOLIN HFA, PROAIR HFA Take 2 Puffs by inhalation every four (4) hours as needed for Wheezing. amitriptyline 25 mg tablet Commonly known as:  ELAVIL Take 50 mg by mouth nightly. aspirin delayed-release 81 mg tablet Take 81 mg by mouth daily. HYDROcodone-acetaminophen 7.5-325 mg per tablet Commonly known as:  Puja Bora Take 1-2 Tabs by mouth every six (6) hours as needed. lansoprazole 15 mg capsule Commonly known as:  PREVACID Take 15 mg by mouth two (2) times a day. REVLIMID 25 mg Cap Generic drug:  lenalidomide Take 25 mg by mouth daily. 1 tab daily x 2 weeks then off for 1 week  
  
 synthroid 25 mcg tablet Generic drug:  levothyroxine Take  by mouth Daily (before breakfast). tiZANidine 4 mg tablet Commonly known as:  Allen Colindres Take 4 mg by mouth two (2) times daily as needed. valsartan-hydroCHLOROthiazide 320-25 mg per tablet Commonly known as:  DIOVAN-HCT  
TAKE 1 TABLET DAILY Prescriptions Sent to Pharmacy Refills  
 valsartan-hydroCHLOROthiazide (DIOVAN-HCT) 320-25 mg per tablet 1 Sig: TAKE 1 TABLET DAILY Class: Normal  
 Pharmacy: [address], [address] [place] [address] Ph #: [phone] Follow-up Instructions Return in about 6 months (around 9/16/2018) for routine care. Introducing [facility]! Dear Wendie Mcpherson: Thank you for requesting a [employer] account.   Our records indicate that you 78 yo F PMHx of COPD on 1.5L O2 PRN (usually uses at night), CAD, MI (1991) s/p CABG, hypothyroidism 2/2 radioablation for hyperthyroidism, AAA s/p repair, HTN, HLD, PVD s/p fem-pop bypass, GERD, and anxiety admitted for COPD exacerbation. Pt found to have elevated troponins and t-wave inversions v4-v6.    NSTEMI  - STEMI called on admission but cancelled  - case d/w cardiology via phone this morning  - agrees with upgrade to tele  - as per cardio start Lovenox, c/w aspirin, plavix, metoprolol.  - pt not taking statins at home, start lipitor  - may need cardiac cath    Acute exacerbation of chronic obstructive pulmonary disease  - received levaquin 750 mg in ED  - Continue solumedrol 60 mg IV Q12h,   - c/w azithromycin 250 mg qd IV day 2/5  - HOB at 45 degrees, aspiration precautions  - Continue Duoneb q6h and PRN; continue with Symbicort q12h  - f/u out pt in 6 months for Pulm nodule (6mm at right lower lobe)    Macrocytosis anemia  - check b12, folate    Hypothyroidism  - c/w synthroid    Anxiety  - Continue xanax     Hypertension:  - Continue dilitazem     Pt wants to be DNR/DNI but not willing to sign MOLST until she discusses with family    #Progress Note Handoff:  Pending (specify):  transfer to tele, treating COPD exacerbation, treating NSTEMI  Family discussion: d/w pt at bedside  Disposition: Home___/SNF___/Other________/Unknown at this time___x_____ already have an active [email or web address] account. You can access your account anytime at [email or web address] "[email or web address] Did you know that you can access your hospital and ER discharge instructions at any time in [employer]? You can also review all of your test results from your hospital stay or ER visit. Additional Information If you have questions, please visit the Frequently Asked Questions section of the [email or web address] website at [email or web address] "[email or web address]/. Remember, [email or web address] is NOT to be used for urgent needs. For medical emergencies, dial 911. Now available from your iPhone and Android! Please provide this summary of care documentation to your next provider. Your primary care clinician is listed as Ethan Dominique. If you have any questions after today's visit, please call [phone]. 76 yo F PMHx of COPD on 1.5L O2 PRN (usually uses at night), CAD, MI (1991) s/p CABG, hypothyroidism 2/2 radioablation for hyperthyroidism, AAA s/p repair, HTN, HLD, PVD s/p fem-pop bypass, GERD, and anxiety admitted for COPD exacerbation. Pt found to have elevated troponins and t-wave inversions v4-v6.    NSTEMI  - STEMI called on admission but cancelled  - case d/w cardiology via phone this morning  - agrees with upgrade to tele  - as per cardio start Lovenox, c/w aspirin, plavix, metoprolol.  - pt not taking statins at home, start lipitor  - may need cardiac cath    Hyperkalemia  - no EKG changes suggestive of hyperkalemia  - insulin, d50, repeat BMP    Acute exacerbation of chronic obstructive pulmonary disease  - received levaquin 750 mg in ED  - Continue solumedrol 60 mg IV Q12h,   - c/w azithromycin 250 mg qd IV day 2/5  - HOB at 45 degrees, aspiration precautions  - Continue Duoneb q6h and PRN; continue with Symbicort q12h  - f/u out pt in 6 months for Pulm nodule (6mm at right lower lobe)    Macrocytosis anemia  - check b12, folate    Hypothyroidism  - c/w synthroid    Anxiety  - Continue xanax     Hypertension:  - Continue dilitazem     Pt wants to be DNR/DNI but not willing to sign MOLST until she discusses with family    #Progress Note Handoff:  Pending (specify):  transfer to tele, treating COPD exacerbation, treating NSTEMI  Family discussion: d/w pt at bedside  Disposition: Home___/SNF___/Other________/Unknown at this time___x_____

## 2019-08-31 NOTE — PROGRESS NOTE ADULT - ASSESSMENT
78 y/o F with PMH of COPD on 1.5L O2 PRN (usually uses at night), CAD, MI (1991) s/p CABG, hypothyroidism 2/2 radioablation for hyperthyroidism, AAA s/p repair, HTN, HLD, PVD s/p fem-pop bypass, GERD, and anxiety admitted for COPD exacerbation    # Acute exacerbation of chronic obstructive pulmonary disease  - had one day levaquin 750 mg in ED  - stable on NC but try BIPAP if desat  - Continue solumedrol 60 mg IV Q12h and azithromycin 500 mg qd IV for 5 total days. Patient will need a long steroid taper, switch to prednisone PO when indicated.  - HOB at 45 degrees, aspiration precautions  - Continue Duoneb q6h and PRN; continue with symbicort q12h  - F/u pulm  -CXR AM   -f/u LE duplex     # Elevated cardiac enzymes - likely NSTEMI   -  STEMI code intitally but cancelled in ED  - Chest pain is pleuritic, with deep breath and cough   - EKG today, new ST-depression at lead II and III  -discussed the case with cardiologist, Dr. Giles: needs Tele and possible cath, no anticoagulation now  -c/w BB, Plavix and ASA  -trend Cardiac enzymes    # Macrocytosis with hx of hypothyroidism  - F/up TSH    # Anxiety:  - Continue xanax 0.25 mg PO q12h    # Hypothyroidism:  - Continue levothyroxine 75 mcg qd    # Hypertension:  - Continue dilitazem 180 qd    Diet: Npo for now due to mild respiratory distress  GI ppx: Protonix 40 mg PO qd  DVT ppx: Lovenox 40 mg qd subq  Activity: OOBTC  Disposition: From home  FULL CODE for now. Patient is unsure if she would want to be intubated if her condition were to deteriorate. Would like more time to think about her decision.   # Code status: Full Code. 76 y/o F with PMH of COPD on 1.5L O2 PRN (usually uses at night), CAD, MI (1991) s/p CABG, hypothyroidism 2/2 radioablation for hyperthyroidism, AAA s/p repair, HTN, HLD, PVD s/p fem-pop bypass, GERD, and anxiety admitted for COPD exacerbation    # Acute exacerbation of chronic obstructive pulmonary disease  - had one day levaquin 750 mg in ED  - stable on NC but try BIPAP if desat  - Continue solumedrol 60 mg IV Q12h,   - azithromycin 250 mg qd IV day 2/5  - HOB at 45 degrees, aspiration precautions  - Continue Duoneb q6h and PRN; continue with Symbicort q12h  -CXR AM   -f/u LE duplex  - F/u pulm  -f/u out pt in 6 months for Pulm nodule (6mm at right lower lobe)    # Elevated cardiac enzymes - likely NSTEMI   - called for STEMI code initially in ED but cancelled  - Chest pain is pleuritic, with deep breath and cough   - EKG today, new ST-depression at lead II and III  -discussed the case with cardiologist, Dr. Giles: needs Tele and possible cath, start ful anticoagulation with Lovenox   -c/w BB, Plavix and ASA  -trend Cardiac enzymes    # Macrocytosis with hx of hypothyroidism  - F/up TSH    # Anxiety:  - Continue xanax 0.25 mg PO q12h    # Hypothyroidism:  - Continue levothyroxine 75 mcg qd    # Hypertension:  - Continue dilitazem 180 qd    Diet: Npo for now due to mild respiratory distress  GI ppx: Protonix 40 mg PO qd  DVT ppx: Lovenox   Activity: OOBTC  Disposition: From home  FULL CODE for now. Patient is unsure if she would want to be intubated if her condition were to deteriorate. Would like more time to think about her decision.   # Code status: Full Code. 76 y/o F with PMH of COPD on 1.5L O2 PRN (usually uses at night), CAD, MI (1991) s/p CABG, hypothyroidism 2/2 radioablation for hyperthyroidism, AAA s/p repair, HTN, HLD, PVD s/p fem-pop bypass, GERD, and anxiety admitted for COPD exacerbation    # Acute exacerbation of chronic obstructive pulmonary disease  - had one day levaquin 750 mg in ED  - stable on NC but try BIPAP if desat  - Continue solumedrol 60 mg IV Q12h,   - azithromycin 250 mg qd IV day 2/5  - HOB at 45 degrees, aspiration precautions  - Continue Duoneb q6h and PRN; continue with Symbicort q12h  -CXR AM   -f/u LE duplex  - F/u pulm  -f/u out pt in 6 months for Pulm nodule (6mm at right lower lobe)    # Elevated cardiac enzymes - likely NSTEMI   - called for STEMI code initially in ED but cancelled  - Chest pain is pleuritic, with deep breath and cough   - EKG today, new ST-depression at lead II and III  -discussed the case with cardiologist, Dr. Giles: needs Tele and possible cath, start ful anticoagulation with Lovenox   -c/w BB, Plavix and ASA  -trend Cardiac enzymes    # Macrocytosis with hx of hypothyroidism  - F/up TSH    # Anxiety:  - Continue xanax 0.25 mg PO q12h    # Hypothyroidism:  - Continue levothyroxine 75 mcg qd    # Hypertension:  - Continue dilitazem 180 qd    Diet: Npo for now due to mild respiratory distress  GI ppx: Protonix 40 mg PO qd  DVT ppx: Lovenox   Activity: OOBTC  Disposition: From home    # Code status: full code but Pt wants to be DNR/DNI, not willing to sign MOLST until she discusses with family 78 y/o F with PMH of COPD on 1.5L O2 PRN (usually uses at night), CAD, MI (1991) s/p CABG, hypothyroidism 2/2 radioablation for hyperthyroidism, AAA s/p repair, HTN, HLD, PVD s/p fem-pop bypass, GERD, and anxiety admitted for COPD exacerbation    # Acute exacerbation of chronic obstructive pulmonary disease  - had one day levaquin 750 mg in ED  - stable on NC but try BIPAP if desat  - Continue solumedrol 60 mg IV Q12h,   - azithromycin 250 mg qd IV day 2/5  - HOB at 45 degrees, aspiration precautions  - Continue Duoneb q6h and PRN; continue with Symbicort q12h  -CXR AM   -f/u LE duplex  - F/u pulm  -f/u out pt in 6 months for Pulm nodule (6mm at right lower lobe)    # Elevated cardiac enzymes - likely NSTEMI   - called for STEMI code initially in ED but cancelled  - Chest pain is pleuritic, with deep breath and cough   - EKG today, new ST-depression at lead II and III  -discussed the case with cardiologist, Dr. Giles: needs Tele and possible cath, start ful anticoagulation with Lovenox   -c/w BB, Lipitor Plavix and ASA  -trend Cardiac enzymes    # Macrocytosis with hx of hypothyroidism  - F/up TSH    # Anxiety:  - Continue xanax 0.25 mg PO q12h    # Hypothyroidism:  - Continue levothyroxine 75 mcg qd    # Hypertension:  - Continue dilitazem 180 qd    Diet: Npo for now due to mild respiratory distress  GI ppx: Protonix 40 mg PO qd  DVT ppx: Lovenox   Activity: OOBTC  Disposition: From home    # Code status: full code but Pt wants to be DNR/DNI, not willing to sign MOLST until she discusses with family 78 y/o F with PMH of COPD on 1.5L O2 PRN (usually uses at night), CAD, MI (1991) s/p CABG, hypothyroidism 2/2 radioablation for hyperthyroidism, AAA s/p repair, HTN, HLD, PVD s/p fem-pop bypass, GERD, and anxiety admitted for COPD exacerbation,     # Acute exacerbation of chronic obstructive pulmonary disease  - had one day levaquin 750 mg in ED  - stable on NC but try BIPAP if desat  - Continue solumedrol 60 mg IV Q12h,   - azithromycin 250 mg qd IV day 2/5  - HOB at 45 degrees, aspiration precautions  - Continue Duoneb q6h and PRN; continue with Symbicort q12h  -CXR AM   -f/u LE duplex  - F/u pulm  -f/u out pt in 6 months for Pulm nodule (6mm at right lower lobe)    # Elevated cardiac enzymes - likely NSTEMI   - called for STEMI code initially in ED but cancelled  - Chest pain is pleuritic, with deep breath and cough   - EKG today, new ST-depression at lead II and III  -discussed the case with cardiologist, Dr. Giles: needs Tele and possible cath, start ful anticoagulation with Lovenox   -c/w BB, Lipitor Plavix and ASA  -trend Cardiac enzymes    # Macrocytosis with hx of hypothyroidism  - F/up TSH    # Anxiety:  - Continue xanax 0.25 mg PO q12h    # Hypothyroidism:  - Continue levothyroxine 75 mcg qd    # Hypertension:  - Continue dilitazem 180 qd    Diet: Npo for now due to mild respiratory distress  GI ppx: Protonix 40 mg PO qd  DVT ppx: Lovenox   Activity: OOBTC  Disposition: From home    # Code status: full code but Pt wants to be DNR/DNI, not willing to sign MOLST until she discusses with family 78 y/o F with PMH of COPD on 1.5L O2 PRN (usually uses at night), CAD, MI (1991) s/p CABG, hypothyroidism 2/2 radioablation for hyperthyroidism, AAA s/p repair, HTN, HLD, PVD s/p fem-pop bypass, GERD, and anxiety admitted for COPD exacerbation,     # Acute exacerbation of chronic obstructive pulmonary disease  - had one day levaquin 750 mg in ED  - stable on NC but try BIPAP if desat  - Continue solumedrol 60 mg IV Q12h,   - azithromycin 250 mg qd IV day 2/5  - HOB at 45 degrees, aspiration precautions  - Continue Duoneb q6h and PRN; continue with Symbicort q12h  -CXR AM   -f/u LE duplex  - F/u pulm  -f/u out pt in 6 months for Pulm nodule (6mm at right lower lobe)    # Elevated cardiac enzymes - likely NSTEMI   - called for STEMI code initially in ED but cancelled  - Chest pain is pleuritic, with deep breath and cough   - EKG today, new ST-depression at lead II and III  -discussed the case with cardiologist, Dr. Giles: needs Tele and possible cath, start ful anticoagulation with Lovenox   -c/w BB, Lipitor Plavix and ASA  -#according to Dr. Giles, can off monitor while pending bed in the tele unit.  -trend Cardiac enzymes    # Macrocytosis with hx of hypothyroidism  - F/up TSH    # Anxiety:  - Continue xanax 0.25 mg PO q12h    # Hypothyroidism:  - Continue levothyroxine 75 mcg qd    # Hypertension:  - Continue dilitazem 180 qd    Diet: Npo for now due to mild respiratory distress  GI ppx: Protonix 40 mg PO qd  DVT ppx: Lovenox   Activity: OOBTC  Disposition: From home    # Code status: full code but Pt wants to be DNR/DNI, not willing to sign MOLST until she discusses with family

## 2019-08-31 NOTE — CHART NOTE - NSCHARTNOTEFT_GEN_A_CORE
pt is 78 yrs old with Hx of CAD s/p CABG admittted for COPD/CHF exacerbation.  rapid response was called for worsening SOB and hypoxia. ECG initially revealed ST elevation for which code STEMI was called.  pt was seen and examined bedside ,was on BiPAP , + crackles on lung auscultation. she denied any active chest pain .  ECG repeated with proper placement of leads failed to reveal any ST elevation.  Case was discussed with interventional cardiology on call and Dr wellington, code STEMI was cancelled due to no ST  elevation on ECG and pt is pain free.  pt will be admitted to CCU for further management of CHF/COPD.  plan discussed with medical team.: BiPAP , IV lasix , BP control , IV AB .

## 2019-08-31 NOTE — CHART NOTE - NSCHARTNOTEFT_GEN_A_CORE
RRT called ~5:35PM. Patient with known NSTEMI prior to transfer to  from . Patient was found to be desaturating on 2L NC and complaining of shortness of breath. O2 was increased to 8L on NC. Denied any chest pain. Admitted  A STEMI code was initially called by cardiology fellow but cancelled on repeat EKG which showed no ST elevations once lead placement was confirmed. ABG showed _____.     Upgrade to CCU approved by Dr. Giles. Keep on BiPAP, IV Lasix 40 mg BID. Keep on ASA, Plavix, Lovenox RAPID RESPONSE AND CCU TRANSFER NOTE    Rapid Response was called at approximately 5:35PM for acute desaturation to mid 70s while on 2 L NC. The RN noted that the patient was very ill-appearing, pale with labored breathing, +use of accessory muscles. She was just transferred from  to . She had denied any chest pain, nausea, vomiting, palpitations, diaphoresis, angina. As per daughter at bedside, she has been complaining of shortness of breath for the past couple of days.     Cardiology fellow was called. STEMI code was called after interpreting EKG which showed ST elevation in the lateral precordial leads; however, leads were misplaced. Repeat EKG with correct lead placement showed NSTEMI in inferolateral leads. She was placed on BiPAP.     ABG showed 7.27/72/78/33, SpO2 92% on BiPAP 14/8, FiO2 50%. Lactate 2.2    Upgrade to CCU was approved by Dr. Giles.     She is normally fully functional and independent at baseline, able to drive by herself.    #) PROBLEM LIST  - Non-ST elevation myocardial infarction, without hemodynamic instability   - Acute hypercapneic respiratory failure; COPD exacerbation 2/2 bilateral pulmonary opacities, ?pneumonia   - MI s/p CABG (1991)  - Hypothyroidism 2/2 post-radioablation for hyperthyroidism   - AAA s/p repair  - PVD s/p femoral-popliteal bypass    #) PLAN:  - Upgrade to CCU as per Dr. Giles  - Patient is to be placed on BiPAP (14/8), FiO2 50%  - Obtain a repeat ABG at 19:30   - Continue with Lovenox 40 mg BID, therapeutic dosing   - Planning for ASA, Plavix   - Follow-up Cardiology for possible cardiac catheterization after she is stabilized  - HOB 45 degrees, aspiration precautions   - Continue with Duoneb Q6H + PRN, Symbicort 160 mg BID   - Start on IV Lasix 40 mg BID   - Follow-up with ID, start on 3.37 g Q8H Zosyn   - As per chart review, patient appears to be desiring DNR/DNI; however, was not willing to sign MOLST form until she discussed it with her family.  - Follow-up stat Trop, CMP, Mg, CBC RAPID RESPONSE AND CCU TRANSFER NOTE    Rapid Response was called at approximately 5:35PM for acute desaturation to mid 70s while on 2 L NC. The RN noted that the patient was very ill-appearing, pale with labored breathing, +use of accessory muscles. She was just transferred from  to . She had denied any chest pain, nausea, vomiting, palpitations, diaphoresis, angina. As per daughter at bedside, she has been complaining of shortness of breath for the past couple of days. She was admitted for NSTEMI. Trop elevated to as high as 0.26 with elevated CK-MB. Initial EKG on admission showed ST depressions in inferior leads.     Cardiology fellow was called. STEMI code was called after interpreting EKG which showed ST elevation in the lateral precordial leads; however, leads were misplaced. Repeat EKG with correct lead placement showed NSTEMI in inferolateral leads. She was placed on BiPAP.     ABG showed 7.27/72/78/33, SpO2 92% on BiPAP 14/8, FiO2 50%. Lactate 2.2    Upgrade to CCU was approved by Dr. Giles.     She is normally fully functional and independent at baseline, able to drive by herself.    #) PROBLEM LIST  - Non-ST elevation myocardial infarction, without hemodynamic instability   - Acute hypercapneic respiratory failure; COPD exacerbation 2/2 bilateral pulmonary opacities, ?pneumonia   - Transaminitis, possibly secondary to DILI  - Hx of MI s/p CABG (1991)  - Hypothyroidism 2/2 post-radioablation for hyperthyroidism   - AAA s/p repair  - PVD s/p femoral-popliteal bypass    #) PLAN:  - Upgrade to CCU as per Dr. Giles  - Patient is to be placed on BiPAP (14/8), FiO2 50%  - Obtain a repeat ABG at 19:30   - Continue with Lovenox 40 mg BID, therapeutic dosing   - Planning for ASA, Plavix   - Follow-up Cardiology for possible cardiac catheterization after she is stabilized  - HOB 45 degrees, aspiration precautions   - Continue with Duoneb Q6H + PRN, Symbicort 160 mg BID   - Follow-up with ID, start on 3.37 g Q8H Zosyn   - Start on IV Lasix 40 mg BID   - Place Loja, strict IOs, daily weights  - Follow-up US Abdomen for transaminitis   - As per chart review, patient appears to be desiring DNR/DNI; however, was not willing to sign MOLST form until she discussed it with her family.  - Follow-up stat Trop, CMP, Mg, CBC RAPID RESPONSE AND CCU TRANSFER NOTE    Rapid Response was called at approximately 5:35PM for acute desaturation to mid 70s while on 2 L NC. The RN noted that the patient was very ill-appearing, pale with labored breathing, +use of accessory muscles. She was just transferred from  to . She had denied any chest pain, nausea, vomiting, palpitations, diaphoresis, angina. As per daughter at bedside, she has been complaining of shortness of breath for the past couple of days. She was admitted for NSTEMI. Trop elevated to as high as 0.26 with elevated CK-MB. Initial EKG on admission showed ST depressions in inferior leads.     Cardiology fellow was called. STEMI code was called after interpreting EKG which showed ST elevation in the lateral precordial leads; however, leads were misplaced. Repeat EKG with correct lead placement showed NSTEMI in inferolateral leads. She was placed on BiPAP.     ABG showed 7.27/72/78/33, SpO2 92% on BiPAP 14/8, FiO2 50%. Lactate 2.2. Stat CXR showed moderate pulmonary vascular congestion with bilateral middle/lower lobe pulmonary opacities.     Upgrade to CCU was approved by Dr. Giles.     She is normally fully functional and independent at baseline, able to drive by herself.    #) PROBLEM LIST  - Non-ST elevation myocardial infarction, without hemodynamic instability   - Acute hypercapneic respiratory failure; COPD exacerbation 2/2 bilateral pulmonary opacities, ?pneumonia   - Transaminitis, possibly secondary to DILI  - Hx of MI s/p CABG (1991)  - Hypothyroidism 2/2 post-radioablation for hyperthyroidism   - AAA s/p repair  - PVD s/p femoral-popliteal bypass    #) PLAN:  - Upgrade to CCU as per Dr. Giles  - Patient is to be placed on BiPAP (14/8), FiO2 50%  - Obtain a repeat ABG at 19:30   - Continue with Lovenox 40 mg BID, therapeutic dosing   - Planning for ASA, Plavix   - Follow-up Cardiology for possible cardiac catheterization after she is stabilized  - HOB 45 degrees, aspiration precautions   - Continue with Duoneb Q6H + PRN, Symbicort 160 mg BID   - Follow-up with ID, start on 3.37 g Q8H Zosyn   - Start on IV Lasix 40 mg BID   - Place [place], strict IOs, daily weights  - Follow-up US Abdomen for transaminitis   - As per chart review, patient appears to be desiring DNR/DNI; however, was not willing to sign MOLST form until she discussed it with her family.  - Follow-up stat Trop, CMP, Mg, CBC

## 2019-08-31 NOTE — PROGRESS NOTE ADULT - ASSESSMENT
History cAD CABG  COPD    Adm with SOB  COPD exac        elevated trop     No chest pain      transfer tel   cont meds   plan medical therapy VS  poss cardiac cath

## 2019-08-31 NOTE — CHART NOTE - NSCHARTNOTEFT_GEN_A_CORE
76 y/o F with PMH of COPD on 1.5L O2 PRN (usually uses at night), CAD, MI (1991) s/p CABG, hypothyroidism 2/2 radioablation for hyperthyroidism, AAA s/p repair, HTN, HLD, PVD s/p fem-pop bypass, GERD, and anxiety presened with worsening SOB since the past 3 weeks, assocaited with chest pain worsen with cough and deep insorption the day prior to admission, pt had no fever.  in ED CTA ruled PE, but showed new bilateral lobe opacities, started on Levaquin and IV solumedrol, now switched to Azithromycin,           #plans:      #Acute exacerbation of chronic obstructive pulmonary disease  - had one day levaquin 750 mg in ED  - stable on NC but try BIPAP if desat  - Continue solumedrol 60 mg IV Q12h,   - azithromycin 250 mg qd IV day 2/5  - HOB at 45 degrees, aspiration precautions  - Continue Duoneb q6h and PRN; continue with Symbicort q12h  -CXR AM   -f/u LE duplex  - F/u pulm  -f/u out pt in 6 months for Pulm nodule (6mm at right lower lobe)    # Elevated cardiac enzymes - likely NSTEMI   - called for STEMI code initially in ED but cancelled  - Chest pain is pleuritic, with deep breath and cough   - EKG today, t-wave invertion and ST-depression at lead II and III  -discussed the case with cardiologist, Dr. Giles: needs Tele and possible cath next week, start ful anticoagulation with Lovenox   -c/w BB, Plavix and ASA  -trend Cardiac enzymes    # Macrocytosis with hx of hypothyroidism  - F/up TSH    # Anxiety:  - Continue xanax 0.25 mg PO q12h    # Hypothyroidism:  - Continue levothyroxine 75 mcg qd    # Hypertension:  - Continue dilitazem 180 qd    Diet: Npo for now due to mild respiratory distress  GI ppx: Protonix 40 mg PO qd  DVT ppx: Lovenox   Activity: OOBTC  Disposition: From home  FULL CODE for now. Patient is unsure if she would want to be intubated if her condition were to deteriorate. Would like more time to think about her decision.   # Code status: Full Code. 78 y/o F with PMH of COPD on 1.5L O2 PRN (usually uses at night), CAD, MI (1991) s/p CABG, hypothyroidism 2/2 radioablation for hyperthyroidism, AAA s/p repair, HTN, HLD, PVD s/p fem-pop bypass, GERD, and anxiety presened with worsening SOB since the past 3 weeks, associated with chest pain worsen with cough and deep insorption the day prior to admission, pt had no fever.  in ED CTA ruled PE, but showed new bilateral lobe opacities, started on Levaquin and IV solumedrol, now switched to Azithromycin, she has been stable on NC, troponin noted to be elevated,  .26,  0.25 from .01, with elevated CKMB 15, EKG t inversion with st depression at lead II and  III, pt has chest pain only when she cough, cardiology Dr. Giles consulted and likely she has active acute COPD and troponin elevation is 2ndry but needs tele motoring, may consider Cath later next week.   c/w ASA, plavix, BB and lovenox           #plans:      #Acute exacerbation of chronic obstructive pulmonary disease  - had one day levaquin 750 mg in ED  - stable on NC but try BIPAP if desat  - Continue solumedrol 60 mg IV Q12h,   - azithromycin 250 mg qd IV day 2/5  - HOB at 45 degrees, aspiration precautions  - Continue Duoneb q6h and PRN; continue with Symbicort q12h  -CXR AM   -f/u LE duplex  - F/u pulm  -f/u out pt in 6 months for Pulm nodule (6mm at right lower lobe)    # Elevated cardiac enzymes - NSTEMI ??  - called for STEMI code initially in ED but cancelled  - Chest pain is pleuritic, with deep breath and cough   - EKG today, t-wave invertion and ST-depression at lead II and III  -discussed the case with cardiologist, Dr. Giles: needs Tele and possible cath next week, start full anticoagulation with Lovenox   -c/w BB, Plavix and ASA  -trend Cardiac enzymes    # Macrocytosis with hx of hypothyroidism  - F/up TSH    # Anxiety:  - Continue xanax 0.25 mg PO q12h    # Hypothyroidism:  - Continue levothyroxine 75 mcg qd    # Hypertension:  - Continue dilitazem 180 qd    Diet: Npo for now due to mild respiratory distress  GI ppx: Protonix 40 mg PO qd  DVT ppx: Lovenox   Activity: OOBTC  Disposition: From home  FULL CODE for now. Patient is unsure if she would want to be intubated if her condition were to deteriorate. Would like more time to think about her decision.   # Code status: Full Code. 76 y/o F with PMH of COPD on 1.5L O2 PRN (usually uses at night), CAD, MI (1991) s/p CABG, hypothyroidism 2/2 radioablation for hyperthyroidism, AAA s/p repair, HTN, HLD, PVD s/p fem-pop bypass, GERD, and anxiety presened with worsening SOB since the past 3 weeks, associated with chest pain worsen with cough and deep insorption the day prior to admission, pt had no fever.  in ED CTA ruled PE, but showed new bilateral lobe opacities, started on Levaquin and IV solumedrol, now switched to Azithromycin, she has been stable on NC, troponin noted to be elevated,  .26,  0.25 from .01, with elevated CKMB 15, EKG t inversion with st depression at lead II and  III, pt has chest pain only when she cough, cardiology Dr. Giles consulted and likely she has active acute COPD and troponin elevation is 2ndry but needs tele motoring, may consider Cath later next week.   c/w ASA, plavix, BB and lovenox           #plans:      #Acute exacerbation of chronic obstructive pulmonary disease  - had one day levaquin 750 mg in ED  - stable on NC but try BIPAP if desat  - Continue solumedrol 60 mg IV Q12h,   - azithromycin 250 mg qd IV day 2/5  - HOB at 45 degrees, aspiration precautions  - Continue Duoneb q6h and PRN; continue with Symbicort q12h  -CXR AM   -f/u LE duplex  - F/u pulm  -f/u out pt in 6 months for Pulm nodule (6mm at right lower lobe)    # Elevated cardiac enzymes - NSTEMI ??  - called for STEMI code initially in ED but cancelled  - Chest pain is pleuritic, with deep breath and cough   - EKG today, t-wave invertion and ST-depression at lead II and III  -discussed the case with cardiologist, Dr. Giles: needs Tele and possible cath next week, start full anticoagulation with Lovenox   -c/w BB, Plavix and ASA  -trend Cardiac enzymes    #hyper K 5.4, give D50 and insulin, f/u bmp    # Macrocytosis with hx of hypothyroidism  - F/up TSH    # Anxiety:  - Continue xanax 0.25 mg PO q12h    # Hypothyroidism:  - Continue levothyroxine 75 mcg qd    # Hypertension:  - Continue dilitazem 180 qd    Diet: Npo for now due to mild respiratory distress  GI ppx: Protonix 40 mg PO qd  DVT ppx: Lovenox   Activity: OOBTC  Disposition: From home  FULL CODE for now. Patient is unsure if she would want to be intubated if her condition were to deteriorate. Would like more time to think about her decision.   # Code status: Full Code. 78 y/o F with PMH of COPD on 1.5L O2 PRN (usually uses at night), CAD, MI (1991) s/p CABG, hypothyroidism 2/2 radioablation for hyperthyroidism, AAA s/p repair, HTN, HLD, PVD s/p fem-pop bypass, GERD, and anxiety presened with worsening SOB since the past 3 weeks, associated with chest pain worsen with cough and deep insorption the day prior to admission, pt had no fever.  in ED CTA ruled PE, but showed new bilateral lobe opacities, started on Levaquin and IV solumedrol, now switched to Azithromycin, she has been stable on NC, troponin noted to be elevated,  .26,  0.25 from .01, with elevated CKMB 15, EKG t inversion with st depression at lead II and  III, pt has chest pain only when she cough, cardiology Dr. Giles consulted and likely she has active acute COPD and troponin elevation is 2ndry but needs tele motoring, may consider Cath later next week.   c/w ASA, plavix, BB, Lipitor  and Lovenox           #plans:      #Acute exacerbation of chronic obstructive pulmonary disease  - had one day levaquin 750 mg in ED  - stable on NC but try BIPAP if desat  - Continue solumedrol 60 mg IV Q12h,   - azithromycin 250 mg qd IV day 2/5  - HOB at 45 degrees, aspiration precautions  - Continue Duoneb q6h and PRN; continue with Symbicort q12h  -CXR AM   -f/u LE duplex  - F/u pulm  -f/u out pt in 6 months for Pulm nodule (6mm at right lower lobe)    # Elevated cardiac enzymes - NSTEMI ??  - called for STEMI code initially in ED but cancelled  - Chest pain is pleuritic, with deep breath and cough   - EKG today, t-wave invertion and ST-depression at lead II and III  -discussed the case with cardiologist, Dr. Giles: needs Tele and possible cath next week, start full anticoagulation with Lovenox   -c/w BB, Lipitor, Plavix and ASA  -trend Cardiac enzymes    #hyper K 5.4, give D50 and insulin, f/u bmp    # Macrocytosis with hx of hypothyroidism  - F/up TSH    # Anxiety:  - Continue xanax 0.25 mg PO q12h    # Hypothyroidism:  - Continue levothyroxine 75 mcg qd    # Hypertension:  - Continue dilitazem 180 qd    Diet: Npo for now due to mild respiratory distress  GI ppx: Protonix 40 mg PO qd  DVT ppx: Lovenox   Activity: OOBTC  Disposition: From home  FULL CODE for now. Patient is unsure if she would want to be intubated if her condition were to deteriorate. Would like more time to think about her decision.   # Code status: Full Code. 78 y/o F with PMH of COPD on 1.5L O2 PRN (usually uses at night), CAD, MI (1991) s/p CABG, hypothyroidism 2/2 radioablation for hyperthyroidism, AAA s/p repair, HTN, HLD, PVD s/p fem-pop bypass, GERD, and anxiety presened with worsening SOB since the past 3 weeks, associated with chest pain worsen with cough and deep insorption the day prior to admission, pt had no fever.  in ED CTA ruled PE, but showed new bilateral lobe opacities, started on Levaquin and IV solumedrol, now switched to Azithromycin, she has been stable on NC, troponin noted to be elevated,  .26,  0.25 from .01, with elevated CKMB 15, EKG t inversion with st depression at lead II and  III, pt has chest pain only when she cough, cardiology Dr. Giles consulted and likely she has active acute COPD and troponin elevation is 2ndry but needs tele motoring, may consider Cath later next week.   c/w ASA, Plavix, BB, Lipitor  and Lovenox       #according to Dr. Giles, can off monitor while pending bed in the tele unit.    #plans:      #Acute exacerbation of chronic obstructive pulmonary disease  - had one day levaquin 750 mg in ED  - stable on NC but try BIPAP if desat  - Continue solumedrol 60 mg IV Q12h,   - azithromycin 250 mg qd IV day 2/5  - HOB at 45 degrees, aspiration precautions  - Continue Duoneb q6h and PRN; continue with Symbicort q12h  -CXR AM   -f/u LE duplex  - F/u pulm  -f/u out pt in 6 months for Pulm nodule (6mm at right lower lobe)    # Elevated cardiac enzymes - NSTEMI ??  - called for STEMI code initially in ED but cancelled  - Chest pain is pleuritic, with deep breath and cough   - EKG today, t-wave invertion and ST-depression at lead II and III  -discussed the case with cardiologist, Dr. Giles: needs Tele and possible cath next week, start full anticoagulation with Lovenox   -c/w BB, Lipitor, Plavix and ASA  -trend Cardiac enzymes    #hyper K 5.4, give D50 and insulin, f/u bmp    # Macrocytosis with hx of hypothyroidism  - F/up TSH    # Anxiety:  - Continue xanax 0.25 mg PO q12h    # Hypothyroidism:  - Continue levothyroxine 75 mcg qd    # Hypertension:  - Continue dilitazem 180 qd    Diet: Npo for now due to mild respiratory distress  GI ppx: Protonix 40 mg PO qd  DVT ppx: Lovenox   Activity: OOBTC  Disposition: From home  FULL CODE for now. Patient is unsure if she would want to be intubated if her condition were to deteriorate. Would like more time to think about her decision.   # Code status: Full Code.

## 2019-08-31 NOTE — PROGRESS NOTE ADULT - SUBJECTIVE AND OBJECTIVE BOX
Patient is a 78y old  Female who presents with a chief complaint of SOB (31 Aug 2019 11:44)      OVERNIGHT EVENTS: mild RS distress, chest pain n cough, likely pleuritic     SUBJECTIVE / INTERVAL HPI: Patient seen and examined at bedside.     VITAL SIGNS:  Vital Signs Last 24 Hrs  T(C): 36.3 (31 Aug 2019 05:36), Max: 37.1 (30 Aug 2019 19:07)  T(F): 97.4 (31 Aug 2019 05:36), Max: 98.7 (30 Aug 2019 19:07)  HR: 75 (31 Aug 2019 05:36) (74 - 87)  BP: 137/67 (31 Aug 2019 05:36) (130/58 - 137/67)  BP(mean): --  RR: 18 (31 Aug 2019 05:36) (18 - 20)  SpO2: 99% (30 Aug 2019 15:37) (99% - 99%)    PHYSICAL EXAM:    General: WDWN  HEENT: NC/AT; PERRL, clear conjunctiva  Neck: supple  Cardiovascular: +S1/S2; RRR  Respiratory: CTA b/l; no W/R/R  Gastrointestinal: soft, NT/ND; +BSx4  Extremities: WWP; 2+ peripheral pulses; no edema   Neurological: AAOx3; no focal deficits    MEDICATIONS:  MEDICATIONS  (STANDING):  ALBUTerol/ipratropium for Nebulization 3 milliLiter(s) Nebulizer every 6 hours  aspirin enteric coated 81 milliGRAM(s) Oral daily  buDESOnide 160 MICROgram(s)/formoterol 4.5 MICROgram(s) Inhaler 2 Puff(s) Inhalation two times a day  chlorhexidine 4% Liquid 1 Application(s) Topical <User Schedule>  clopidogrel Tablet 75 milliGRAM(s) Oral daily  diltiazem    milliGRAM(s) Oral daily  docusate sodium 100 milliGRAM(s) Oral three times a day  enoxaparin Injectable 40 milliGRAM(s) SubCutaneous daily  levothyroxine 75 MICROGram(s) Oral daily  methylPREDNISolone sodium succinate Injectable 60 milliGRAM(s) IV Push every 12 hours  metoprolol tartrate 12.5 milliGRAM(s) Oral two times a day  pantoprazole    Tablet 40 milliGRAM(s) Oral before breakfast    MEDICATIONS  (PRN):  ALBUTerol    90 MICROgram(s) HFA Inhaler 2 Puff(s) Inhalation every 4 hours PRN Shortness of Breath and/or Wheezing  ondansetron Injectable 4 milliGRAM(s) IV Push every 6 hours PRN Nausea and/or Vomiting      ALLERGIES:  Allergies    No Known Allergies    Intolerances        LABS:                        11.7   10.44 )-----------( 268      ( 31 Aug 2019 07:09 )             36.9     08-31    144  |  103  |  16  ----------------------------<  139<H>  5.4<H>   |  32  |  0.6<L>    Ca    9.7      31 Aug 2019 07:09  Mg     2.1     08-31    TPro  6.6  /  Alb  4.5  /  TBili  0.6  /  DBili  x   /  AST  46<H>  /  ALT  21  /  AlkPhos  51  08-31      < from: CT Angio Chest w/ IV Cont (08.29.19 @ 22:39) >  IMPRESSION:    1.  No pulmonary embolism.    2.  New bilateral lower lobe dependent groundglassand nodular opacities,   likely infectious or inflammatory in etiology.    3.  Unchanged 6 mm spiculated paramedial right lower lobe nodule. A   follow-up CT chest is recommended in 6 months.    4.  Redemonstrated severe pulmonary emphysema    < end of copied text >    CAPILLARY BLOOD GLUCOSE          RADIOLOGY & ADDITIONAL TESTS: Reviewed.    ASSESSMENT:    PLAN: Patient is a 78y old  Female who presents with a chief complaint of SOB (31 Aug 2019 11:44)      OVERNIGHT EVENTS: mild RS distress, chest pain n cough, likely pleuritic     SUBJECTIVE / INTERVAL HPI: Patient seen and examined at bedside.     VITAL SIGNS:  Vital Signs Last 24 Hrs  T(C): 36.3 (31 Aug 2019 05:36), Max: 37.1 (30 Aug 2019 19:07)  T(F): 97.4 (31 Aug 2019 05:36), Max: 98.7 (30 Aug 2019 19:07)  HR: 75 (31 Aug 2019 05:36) (74 - 87)  BP: 137/67 (31 Aug 2019 05:36) (130/58 - 137/67)  BP(mean): --  RR: 18 (31 Aug 2019 05:36) (18 - 20)  SpO2: 99% (30 Aug 2019 15:37) (99% - 99%)    PHYSICAL EXAM:    General: WDWN  HEENT: NC/AT; PERRL, clear conjunctiva  Neck: supple  Cardiovascular: +S1/S2; RRR  Respiratory: CTA b/l; no W/R/R  Gastrointestinal: soft, NT/ND; +BSx4  Extremities: WWP; 2+ peripheral pulses; no edema   Neurological: AAOx3; no focal deficits    MEDICATIONS:  MEDICATIONS  (STANDING):  ALBUTerol/ipratropium for Nebulization 3 milliLiter(s) Nebulizer every 6 hours  aspirin enteric coated 81 milliGRAM(s) Oral daily  buDESOnide 160 MICROgram(s)/formoterol 4.5 MICROgram(s) Inhaler 2 Puff(s) Inhalation two times a day  chlorhexidine 4% Liquid 1 Application(s) Topical <User Schedule>  clopidogrel Tablet 75 milliGRAM(s) Oral daily  diltiazem    milliGRAM(s) Oral daily  docusate sodium 100 milliGRAM(s) Oral three times a day  enoxaparin Injectable 40 milliGRAM(s) SubCutaneous daily  levothyroxine 75 MICROGram(s) Oral daily  methylPREDNISolone sodium succinate Injectable 60 milliGRAM(s) IV Push every 12 hours  metoprolol tartrate 12.5 milliGRAM(s) Oral two times a day  pantoprazole    Tablet 40 milliGRAM(s) Oral before breakfast    MEDICATIONS  (PRN):  ALBUTerol    90 MICROgram(s) HFA Inhaler 2 Puff(s) Inhalation every 4 hours PRN Shortness of Breath and/or Wheezing  ondansetron Injectable 4 milliGRAM(s) IV Push every 6 hours PRN Nausea and/or Vomiting      ALLERGIES:  Allergies    No Known Allergies    Intolerances        LABS:                        11.7   10.44 )-----------( 268      ( 31 Aug 2019 07:09 )             36.9     08-31    144  |  103  |  16  ----------------------------<  139<H>  5.4<H>   |  32  |  0.6<L>    Ca    9.7      31 Aug 2019 07:09  Mg     2.1     08-31    TPro  6.6  /  Alb  4.5  /  TBili  0.6  /  DBili  x   /  AST  46<H>  /  ALT  21  /  AlkPhos  51  08-31      < from: CT Angio Chest w/ IV Cont (08.29.19 @ 22:39) >  IMPRESSION:    1.  No pulmonary embolism.    2.  New bilateral lower lobe dependent groundglassand nodular opacities,   likely infectious or inflammatory in etiology.    3.  Unchanged 6 mm spiculated paramedial right lower lobe nodule. A   follow-up CT chest is recommended in 6 months.    4.  Redemonstrated severe pulmonary emphysema    < end of copied text >

## 2019-08-31 NOTE — PROGRESS NOTE ADULT - SUBJECTIVE AND OBJECTIVE BOX
CHIEF COMPLAINT:    Patient is a 78y old  Female who presents with a chief complaint of SOB     INTERVAL HPI/OVERNIGHT EVENTS:    Patient seen and examined at bedside. No acute overnight events occurred.    ROS: Denies chest pain, has epigastric pain, worse with eating. All other systems are negative.    Vital Signs:    T(F): 98.4 (19 @ 12:12), Max: 98.7 (19 @ 19:07)  HR: 77 (19 @ 12:12) (74 - 87)  BP: 128/59 (19 @ 12:12) (128/59 - 137/67)  RR: 18 (19 @ 12:12) (18 - 20)  SpO2: 99% (19 @ 15:37) (99% - 99%)  I&O's Summary    31 Aug 2019 07:01  -  31 Aug 2019 13:02  --------------------------------------------------------  IN: 200 mL / OUT: 0 mL / NET: 200 mL      Daily Height in cm: 152.4 (30 Aug 2019 19:07)    Daily Weight in k.6 (31 Aug 2019 12:12)  CAPILLARY BLOOD GLUCOSE          PHYSICAL EXAM:  GENERAL:  NAD  SKIN: Mid sternal scar  HEENT: Atraumatic. Normocephalic. Anicteric  NECK:  No JVD.   PULMONARY: Clear to ausculation bilaterally. No wheezing. No rales  CVS: Normal S1, S2. Regular rate and rhythm. No murmurs.  ABDOMEN/GI: Soft, Nontender, Nondistended; Bowel sounds are present  EXTREMITIES:  No edema B/L LE.  NEUROLOGIC:  No motor deficit.  PSYCH: Alert & oriented x 3, normal affect    Consultant(s) Notes Reviewed:  [x ] YES  [ ] NO  Care Discussed with Consultants/Other Providers [ x] YES  [ ] NO    LABS:                        11.7   10.44 )-----------( 268      ( 31 Aug 2019 07:09 )             36.9         144  |  103  |  16  ----------------------------<  139<H>  5.4<H>   |  32  |  0.6<L>    Ca    9.7      31 Aug 2019 07:09  Mg     2.1         TPro  6.6  /  Alb  4.5  /  TBili  0.6  /  DBili  x   /  AST  46<H>  /  ALT  21  /  AlkPhos  51          Trop 0.23, CKMB 15.9, , 19 @ 07:09  Trop 0.26, CKMB --, CK --, 19 @ 16:38  Trop 0.25, CKMB --, CK --, 19 @ 05:36  Trop 0.19, CKMB --, CK --, 19 @ 00:21  Trop <0.01, CKMB --, CK --, 19 @ 17:35      Culture - Blood (collected 29 Aug 2019 19:00)  Source: .Blood Blood  Preliminary Report (31 Aug 2019 04:00):    No growth to date.    Culture - Blood (collected 29 Aug 2019 19:00)  Source: .Blood Blood  Preliminary Report (31 Aug 2019 04:00):    No growth to date.        RADIOLOGY & ADDITIONAL TESTS:  No new images    Medications:  Standing  ALBUTerol/ipratropium for Nebulization 3 milliLiter(s) Nebulizer every 6 hours  aspirin enteric coated 81 milliGRAM(s) Oral daily  azithromycin  IVPB 250 milliGRAM(s) IV Intermittent every 24 hours  buDESOnide 160 MICROgram(s)/formoterol 4.5 MICROgram(s) Inhaler 2 Puff(s) Inhalation two times a day  chlorhexidine 4% Liquid 1 Application(s) Topical <User Schedule>  clopidogrel Tablet 75 milliGRAM(s) Oral daily  diltiazem    milliGRAM(s) Oral daily  docusate sodium 100 milliGRAM(s) Oral three times a day  enoxaparin Injectable 40 milliGRAM(s) SubCutaneous daily  levothyroxine 75 MICROGram(s) Oral daily  methylPREDNISolone sodium succinate Injectable 60 milliGRAM(s) IV Push every 12 hours  metoprolol tartrate 12.5 milliGRAM(s) Oral two times a day  pantoprazole    Tablet 40 milliGRAM(s) Oral before breakfast    PRN Meds  acetaminophen   Tablet .. 650 milliGRAM(s) Oral every 6 hours PRN  ALBUTerol    90 MICROgram(s) HFA Inhaler 2 Puff(s) Inhalation every 4 hours PRN  ondansetron Injectable 4 milliGRAM(s) IV Push every 6 hours PRN      Case discussed with resident  Care discussed with pt

## 2019-09-01 NOTE — PROGRESS NOTE ADULT - ASSESSMENT
78 yo F PMHx of COPD on 1.5L O2 PRN (usually uses at night), CAD, MI (1991) s/p CABG, hypothyroidism 2/2 radioablation for hyperthyroidism, AAA s/p repair, HTN, HLD, PVD s/p fem-pop bypass, GERD, and anxiety admitted for COPD exacerbation. Pt found to have elevated troponin and t-wave inversions v4-v6. Pt upgraded to tele yesterday morning for NSTEMI cardiac monitoring and then to CCU yesterday evening for dypnea/hypoxia    NSTEMI  - pt requires continue CCU monitoring  - c/w  Lovenox, c/w aspirin, plavix, metoprolol.  - pt states her pulmonologist d/c her statins, she is unsure why. Atorvastatin restarted (LFTs mildly elevated)  - may need cardiac cath    Hyperkalemia  - resolved    Acute on chronic respiratory failure due to acute exacerbation of chronic obstructive pulmonary disease  - no evidence of pneumonia  - c/w bipap prn  - d/c zosyn  - c/w azithromycin 250 mg qd IV day 3/5  - HOB at 45 degrees, aspiration precautions  - Continue Duoneb q6h and PRN; continue with Symbicort q12h  - f/u out pt in 6 months for Pulm nodule (6mm at right lower lobe)    Macrocytosis anemia  - check b12, folate    Hypothyroidism  - c/w synthroid    Anxiety  - Continue xanax     Hypertension:  - Continue diltiazem     Pt wants to be DNR/DNI but not willing to sign MOLST until she discusses with family    #Progress Note Handoff:  Pending (specify):  transfer to tele, treating COPD exacerbation, treating NSTEMI  Family discussion: d/w pt at bedside  Disposition: Home___/SNF___/Other________/Unknown at this time___x_____

## 2019-09-01 NOTE — CHART NOTE - NSCHARTNOTEFT_GEN_A_CORE
I was called to the patient's bedside at 7:00 pm  Patient self-disimpacted herself causing injury to rectal mucosa and subsequent rectal bleed.   Rectal bleed was self limited.   Patient placed on stool softners bid, CBC repeated to assess for significance of bleed.   Hemodynamically stable.

## 2019-09-01 NOTE — PROGRESS NOTE ADULT - SUBJECTIVE AND OBJECTIVE BOX
INTERVAL HISTORY: No overnight events.  	  MEDICATIONS:  acetaminophen   Tablet .. 650 milliGRAM(s) Oral every 6 hours PRN  ALBUTerol/ipratropium for Nebulization 3 milliLiter(s) Nebulizer every 6 hours  aspirin enteric coated 81 milliGRAM(s) Oral daily  atorvastatin 80 milliGRAM(s) Oral at bedtime  buDESOnide 160 MICROgram(s)/formoterol 4.5 MICROgram(s) Inhaler 2 Puff(s) Inhalation two times a day  chlorhexidine 4% Liquid 1 Application(s) Topical <User Schedule>  clopidogrel Tablet 75 milliGRAM(s) Oral daily  diltiazem    milliGRAM(s) Oral daily  docusate sodium 100 milliGRAM(s) Oral three times a day  enoxaparin Injectable 40 milliGRAM(s) SubCutaneous every 12 hours  levothyroxine 75 MICROGram(s) Oral daily  methylPREDNISolone sodium succinate Injectable 60 milliGRAM(s) IV Push every 12 hours  metoprolol tartrate 12.5 milliGRAM(s) Oral two times a day  ondansetron Injectable 4 milliGRAM(s) IV Push every 6 hours PRN  pantoprazole    Tablet 40 milliGRAM(s) Oral before breakfast  piperacillin/tazobactam IVPB.. 3.375 Gram(s) IV Intermittent every 8 hours      REVIEW OF SYSTEMS:  CONSTITUTIONAL: No fever, weight loss, or fatigue  NECK: No pain or stiffness  RESPIRATORY: No cough, wheezing, chills or hemoptysis; No shortness of breath  CARDIOVASCULAR: No chest pain, palpitations, dizziness, or leg swelling  GASTROINTESTINAL: No abdominal or epigastric pain. No nausea, vomiting, or hematemesis; No diarrhea or constipation. No melena or hematochezia.  GENITOURINARY: No dysuria, frequency, hematuria, or incontinence  NEUROLOGICAL: No headaches, memory loss, loss of strength, numbness, or tremors  SKIN: No itching, burning, rashes, or lesions   ENDOCRINE: No heat or cold intolerance; No hair loss  MUSCULOSKELETAL: No joint pain or swelling; No muscle, back, or extremity pain  HEME/LYMPH: No easy bruising, or bleeding gums    PHYSICAL EXAM:  T(C): 35.6 (09-01-19 @ 04:00), Max: 36.9 (08-31-19 @ 12:12)  HR: 82 (09-01-19 @ 06:00) (76 - 115)  BP: 156/71 (09-01-19 @ 06:00) (118/66 - 192/90)  RR: 24 (09-01-19 @ 06:00) (18 - 28)  SpO2: 96% (09-01-19 @ 06:00) (79% - 100%)  Wt(kg): --  I&O's Summary    31 Aug 2019 07:01  -  01 Sep 2019 07:00  --------------------------------------------------------  IN: 480 mL / OUT: 1725 mL / NET: -1245 mL      Height (cm): 152.4 (08-31 @ 20:32)  Weight (kg): 44.2 (08-31 @ 20:32)  BMI (kg/m2): 19 (08-31 @ 20:32)  BSA (m2): 1.38 (08-31 @ 20:32)    GENERAL: NAD, well-groomed, well-developed  HEAD:  Atraumatic, Normocephalic  NECK: Supple, No JVD, Normal thyroid  NERVOUS SYSTEM:  Alert & Oriented X3, Good concentration  CHEST/LUNG: Clear to percussion bilaterally; No rales, rhonchi, wheezing, or rubs  HEART: Regular rate and rhythm; No murmurs, rubs, or gallops  ABDOMEN: Soft, Nontender, Nondistended; Bowel sounds present  EXTREMITIES:  2+ Peripheral Pulses, No clubbing, cyanosis, or edema  SKIN: No rashes or lesions    LABS:	 	    CARDIAC MARKERS ( 01 Sep 2019 05:00 )  x     / 0.29 ng/mL / 160 U/L / x     / 18.8 ng/mL  CARDIAC MARKERS ( 31 Aug 2019 17:39 )  x     / 0.17 ng/mL / 145 U/L / x     / 11.1 ng/mL  CARDIAC MARKERS ( 31 Aug 2019 07:09 )  x     / 0.23 ng/mL / 184 U/L / x     / 15.9 ng/mL  CARDIAC MARKERS ( 30 Aug 2019 16:38 )  x     / 0.26 ng/mL / x     / x     / x                                12.0   13.48 )-----------( 263      ( 01 Sep 2019 05:00 )             37.6     09-01    142  |  97<L>  |  24<H>  ----------------------------<  151<H>  4.4   |  33<H>  |  0.7    Ca    9.6      01 Sep 2019 05:00  Mg     2.2     08-31    TPro  7.4  /  Alb  4.9  /  TBili  0.5  /  DBili  x   /  AST  66<H>  /  ALT  46<H>  /  AlkPhos  63  08-31    proBNP:   Lipid Profile:

## 2019-09-01 NOTE — PROGRESS NOTE ADULT - ASSESSMENT
Impression  Acute on Chronic Hypercapnic Respiratory Failure  CHF   COPD stable   Pulmonary Nodule 6mm  NSTEMI now   Hypothyroidism  HTN    Recommendations  -Bipap Q4 on and off PRN  - Continue Duonebs , IV 40 mg  steroids , ronit ue   lasix 40 mg Q 12 hrs follow BNP, VBMP   consider repeat echo   - cardio f/u for NSTEMI  -f/u out pt for Pulm nodule  -Poor prognosis   - Advance directives  on lovenox

## 2019-09-01 NOTE — PROGRESS NOTE ADULT - SUBJECTIVE AND OBJECTIVE BOX
CHIEF COMPLAINT:    Patient is a 78y old  Female who presents with a chief complaint of SOB     INTERVAL HPI/OVERNIGHT EVENTS:    Patient seen and examined at bedside. Pt upgraded to CCU overnight    ROS: Reports SOB while moving. Denies chest pain. All other systems are negative.    Vital Signs:    T(F): 97 (19 @ 12:00), Max: 97 (19 @ 12:00)  HR: 78 (19 @ 12:00) (74 - 115)  BP: 155/70 (19 @ 12:00) (118/66 - 192/90)  RR: 24 (19 @ 12:00) (18 - 28)  SpO2: 99% (19 @ 12:00) (79% - 100%)  I&O's Summary    31 Aug 2019 07:  -  01 Sep 2019 07:00  --------------------------------------------------------  IN: 480 mL / OUT: 1725 mL / NET: -1245 mL    01 Sep 2019 07:  -  01 Sep 2019 12:33  --------------------------------------------------------  IN: 120 mL / OUT: 450 mL / NET: -330 mL      Daily Height in cm: 152.4 (31 Aug 2019 20:32)    Daily Weight in k.9 (01 Sep 2019 06:00)  CAPILLARY BLOOD GLUCOSE      POCT Blood Glucose.: 202 mg/dL (31 Aug 2019 20:30)  POCT Blood Glucose.: 180 mg/dL (31 Aug 2019 17:23)  POCT Blood Glucose.: 137 mg/dL (31 Aug 2019 16:45)      PHYSICAL EXAM:  GENERAL:  NAD  SKIN: Midsternal scar  HEENT: Atraumatic. Normocephalic. Anicteric  NECK:  No JVD.   PULMONARY: Bibasilar crackles, decreased breathsounds  CVS: Normal S1, S2. Regular rate and rhythm. No murmurs.  ABDOMEN/GI: Soft, Nontender, Nondistended; Bowel sounds are present  EXTREMITIES:  No edema B/L LE.  NEUROLOGIC:  No motor deficit.  PSYCH: Alert & oriented x 3, normal affect    Consultant(s) Notes Reviewed:  [x ] YES  [ ] NO  Care Discussed with Consultants/Other Providers [ x] YES  [ ] NO    LABS:                        12.0   13.48 )-----------( 263      ( 01 Sep 2019 05:00 )             37.6         142  |  97<L>  |  24<H>  ----------------------------<  151<H>  4.4   |  33<H>  |  0.7    Ca    9.6      01 Sep 2019 05:00  Mg     2.2         TPro  7.4  /  Alb  4.9  /  TBili  0.5  /  DBili  x   /  AST  66<H>  /  ALT  46<H>  /  AlkPhos  63          Trop 0.29, CKMB 18.8, , 19 @ 05:00  Trop 0.17, CKMB 11.1, , 19 @ 17:39  Trop 0.23, CKMB 15.9, , 19 @ 07:09  Trop 0.26, CKMB --, CK --, 19 @ 16:38  Trop 0.25, CKMB --, CK --, 19 @ 05:36  Trop 0.19, CKMB --, CK --, 19 @ 00:21  Trop <0.01, CKMB --, CK --, 19 @ 17:35      Culture - Blood (collected 29 Aug 2019 19:00)  Source: .Blood Blood  Preliminary Report (31 Aug 2019 04:00):    No growth to date.    Culture - Blood (collected 29 Aug 2019 19:00)  Source: .Blood Blood  Preliminary Report (31 Aug 2019 04:00):    No growth to date.        RADIOLOGY & ADDITIONAL TESTS:  No new imaging    Medications:  Standing  ALBUTerol/ipratropium for Nebulization 3 milliLiter(s) Nebulizer every 6 hours  ALPRAZolam 0.5 milliGRAM(s) Oral daily  aspirin enteric coated 81 milliGRAM(s) Oral daily  atorvastatin 80 milliGRAM(s) Oral at bedtime  buDESOnide 160 MICROgram(s)/formoterol 4.5 MICROgram(s) Inhaler 2 Puff(s) Inhalation two times a day  chlorhexidine 4% Liquid 1 Application(s) Topical <User Schedule>  clopidogrel Tablet 75 milliGRAM(s) Oral daily  diltiazem    milliGRAM(s) Oral daily  docusate sodium 100 milliGRAM(s) Oral three times a day  enoxaparin Injectable 40 milliGRAM(s) SubCutaneous every 12 hours  furosemide   Injectable 40 milliGRAM(s) IV Push two times a day  levothyroxine 75 MICROGram(s) Oral daily  methylPREDNISolone sodium succinate Injectable 40 milliGRAM(s) IV Push two times a day  metoprolol tartrate 12.5 milliGRAM(s) Oral two times a day  pantoprazole    Tablet 40 milliGRAM(s) Oral before breakfast  piperacillin/tazobactam IVPB.. 3.375 Gram(s) IV Intermittent every 8 hours    PRN Meds  acetaminophen   Tablet .. 650 milliGRAM(s) Oral every 6 hours PRN  ondansetron Injectable 4 milliGRAM(s) IV Push every 6 hours PRN      Case discussed with resident  Care discussed with pt

## 2019-09-01 NOTE — PROGRESS NOTE ADULT - SUBJECTIVE AND OBJECTIVE BOX
Patient is a 78y old  Female who presents with a chief complaint of SOB (01 Sep 2019 07:29)      Over Night Events:  Patient seen and examined. yesterday he desat EKG elevated st ?? elevated Trop 0.22-o.29       ROS:  See HPI    PHYSICAL EXAM    ICU Vital Signs Last 24 Hrs  T(C): 35.8 (01 Sep 2019 08:00), Max: 36.9 (31 Aug 2019 12:12)  T(F): 96.4 (01 Sep 2019 08:00), Max: 98.4 (31 Aug 2019 12:12)  HR: 86 (01 Sep 2019 10:00) (74 - 115)  BP: 154/75 (01 Sep 2019 10:00) (118/66 - 192/90)  BP(mean): 104 (01 Sep 2019 10:00) (84 - 111)  ABP: --  ABP(mean): --  RR: 24 (01 Sep 2019 10:00) (18 - 28)  SpO2: 98% (01 Sep 2019 10:00) (79% - 100%)      General: Aox3  HEENT:    kandy            Lymph Nodes: NO cervical LN   Lungs: Bilateral BS  Cardiovascular: Regular   Abdomen: Soft, Positive BS  Extremities: No clubbing   Skin: warm   Neurological: no focal deficit   Musculoskeletal: move all ext     I&O's Detail    31 Aug 2019 07:01  -  01 Sep 2019 07:00  --------------------------------------------------------  IN:    IV PiggyBack: 100 mL    Oral Fluid: 380 mL  Total IN: 480 mL    OUT:    Indwelling Catheter - Urethral: 1725 mL  Total OUT: 1725 mL    Total NET: -1245 mL      01 Sep 2019 07:01  -  01 Sep 2019 10:48  --------------------------------------------------------  IN:    Oral Fluid: 120 mL  Total IN: 120 mL    OUT:    Indwelling Catheter - Urethral: 450 mL  Total OUT: 450 mL    Total NET: -330 mL          LABS:                          12.0   13.48 )-----------( 263      ( 01 Sep 2019 05:00 )             37.6         01 Sep 2019 05:00    142    |  97     |  24     ----------------------------<  151    4.4     |  33     |  0.7      Ca    9.6        01 Sep 2019 05:00  Mg     2.2       31 Aug 2019 17:39    TPro  7.4    /  Alb  4.9    /  TBili  0.5    /  DBili  x      /  AST  66     /  ALT  46     /  AlkPhos  63     31 Aug 2019 17:39  Amylase x     lipase x                                                                                              CARDIAC MARKERS ( 01 Sep 2019 05:00 )  x     / 0.29 ng/mL / 160 U/L / x     / 18.8 ng/mL  CARDIAC MARKERS ( 31 Aug 2019 17:39 )  x     / 0.17 ng/mL / 145 U/L / x     / 11.1 ng/mL  CARDIAC MARKERS ( 31 Aug 2019 07:09 )  x     / 0.23 ng/mL / 184 U/L / x     / 15.9 ng/mL  CARDIAC MARKERS ( 30 Aug 2019 16:38 )  x     / 0.26 ng/mL / x     / x     / x                                                            Culture - Blood (collected 29 Aug 2019 19:00)  Source: .Blood Blood  Preliminary Report (31 Aug 2019 04:00):    No growth to date.    Culture - Blood (collected 29 Aug 2019 19:00)  Source: .Blood Blood  Preliminary Report (31 Aug 2019 04:00):    No growth to date.                                                                                       ABG - ( 31 Aug 2019 22:41 )  pH, Arterial: 7.40  pH, Blood: x     /  pCO2: 56    /  pO2: 128   / HCO3: 35    / Base Excess: 8.3   /  SaO2: 98                  MEDICATIONS  (STANDING):  ALBUTerol/ipratropium for Nebulization 3 milliLiter(s) Nebulizer every 6 hours  aspirin enteric coated 81 milliGRAM(s) Oral daily  atorvastatin 80 milliGRAM(s) Oral at bedtime  buDESOnide 160 MICROgram(s)/formoterol 4.5 MICROgram(s) Inhaler 2 Puff(s) Inhalation two times a day  chlorhexidine 4% Liquid 1 Application(s) Topical <User Schedule>  clopidogrel Tablet 75 milliGRAM(s) Oral daily  diltiazem    milliGRAM(s) Oral daily  docusate sodium 100 milliGRAM(s) Oral three times a day  enoxaparin Injectable 40 milliGRAM(s) SubCutaneous every 12 hours  furosemide   Injectable 40 milliGRAM(s) IV Push two times a day  lactulose Syrup 20 Gram(s) Oral once  levothyroxine 75 MICROGram(s) Oral daily  methylPREDNISolone sodium succinate Injectable 60 milliGRAM(s) IV Push every 12 hours  metoprolol tartrate 12.5 milliGRAM(s) Oral two times a day  pantoprazole    Tablet 40 milliGRAM(s) Oral before breakfast  piperacillin/tazobactam IVPB.. 3.375 Gram(s) IV Intermittent every 8 hours    MEDICATIONS  (PRN):  acetaminophen   Tablet .. 650 milliGRAM(s) Oral every 6 hours PRN Moderate Pain (4 - 6)  ondansetron Injectable 4 milliGRAM(s) IV Push every 6 hours PRN Nausea and/or Vomiting          Xrays:  TLC:  OG:  ET tube:                                                                                    b/l effusion and congestion    ECHO:  [facility] ICU:

## 2019-09-02 NOTE — PROGRESS NOTE ADULT - ASSESSMENT
76 yo F PMHx of COPD on 1.5L O2 PRN (usually uses at night), CAD, MI (1991) s/p CABG, hypothyroidism 2/2 radioablation for hyperthyroidism, AAA s/p repair, HTN, HLD, PVD s/p fem-pop bypass, GERD, and anxiety admitted for COPD exacerbation. Pt found to have elevated troponin and t-wave inversions v4-v6. Pt upgraded to CCU on 8/31/19    NSTEMI  - has extensive cardiac history  - pt requires continue CCU monitoring  - c/w  Lovenox, c/w aspirin, plavix, metoprolol.  - c/w IV lasix as pt had bibasilar crackles transition to PO tomorrow  - risk and benefit Lipitor with elevated LFTs d/w pt, agreeable to start medication  - may need cardiac cath  - follow up cardiology today  - BNP elevated  - repeat 2d echo  - no tele events    Hyperkalemia  - resolved    Acute on chronic hypercapnic respiratory failure due to acute exacerbation of chronic obstructive pulmonary disease  - no evidence of pneumonia  - c/w bipap prn  - azithromycin d/nohemi  - HOB at 45 degrees, aspiration precautions  - c/w steroids  - Continue Duoneb q6h and PRN; continue with Symbicort q12h  - f/u out pt in 6 months for Pulm nodule (6mm at right lower lobe)    Macrocytosis anemia  -b12, folate levels pending    Hypothyroidism  - c/w synthroid  - TSH 0.74    Anxiety  - Continue xanax     Hypertension  - Continue diltiazem     Pt wants to be DNR/DNI but not willing to sign MOLST until she discusses with family    Pt has guarded prognosis and requires continued monitoring in ICU    #Progress Note Handoff:  Pending (specify):  medical treatment vs cardiac cath for NSTEMI  Family discussion: d/w pt at bedside  Disposition: Home___/SNF___/Other________/Unknown at this time___x_____

## 2019-09-02 NOTE — PROGRESS NOTE ADULT - SUBJECTIVE AND OBJECTIVE BOX
Patient is a 78y old  Female who presents with a chief complaint of SOB (01 Sep 2019 12:32)      Over Night Events:  Patient seen and examined feel better on lovenox       ROS:  See HPI    PHYSICAL EXAM    ICU Vital Signs Last 24 Hrs  T(C): 36.7 (02 Sep 2019 08:00), Max: 37 (02 Sep 2019 06:00)  T(F): 98 (02 Sep 2019 08:00), Max: 98.6 (02 Sep 2019 06:00)  HR: 80 (02 Sep 2019 08:00) (78 - 96)  BP: 133/68 (02 Sep 2019 08:00) (110/81 - 159/75)  BP(mean): 85 (02 Sep 2019 08:00) (85 - 112)  ABP: --  ABP(mean): --  RR: 35 (02 Sep 2019 08:00) (23 - 35)  SpO2: 96% (02 Sep 2019 08:00) (94% - 100%)      General:AOx3  HEENT:   kandy             Lymph Nodes: NO cervical LN   Lungs: Bilateral BS  Cardiovascular: Regular   Abdomen: Soft, Positive BS  Extremities: No clubbing   Skin: warm   Neurological: no focal deficit   Musculoskeletal: move all ext     I&O's Detail    01 Sep 2019 07:01  -  02 Sep 2019 07:00  --------------------------------------------------------  IN:    IV PiggyBack: 200 mL    Oral Fluid: 1260 mL  Total IN: 1460 mL    OUT:    Indwelling Catheter - Urethral: 1270 mL  Total OUT: 1270 mL    Total NET: 190 mL          LABS:                          11.9   18.88 )-----------( 243      ( 02 Sep 2019 04:35 )             37.5         02 Sep 2019 04:35    141    |  93     |  24     ----------------------------<  116    3.7     |  35     |  0.7      Ca    9.2        02 Sep 2019 04:35  Phos  3.4       02 Sep 2019 04:35  Mg     2.3       02 Sep 2019 04:35    TPro  6.5    /  Alb  4.5    /  TBili  0.7    /  DBili  x      /  AST  75     /  ALT  159    /  AlkPhos  52     02 Sep 2019 04:35  Amylase x     lipase x                                                                                              CARDIAC MARKERS ( 01 Sep 2019 05:00 )  x     / 0.29 ng/mL / 160 U/L / x     / 18.8 ng/mL  CARDIAC MARKERS ( 31 Aug 2019 17:39 )  x     / 0.17 ng/mL / 145 U/L / x     / 11.1 ng/mL                                                                                                                                         ABG - ( 31 Aug 2019 22:41 )  pH, Arterial: 7.40  pH, Blood: x     /  pCO2: 56    /  pO2: 128   / HCO3: 35    / Base Excess: 8.3   /  SaO2: 98                  MEDICATIONS  (STANDING):  ALBUTerol/ipratropium for Nebulization 3 milliLiter(s) Nebulizer every 6 hours  ALPRAZolam 0.5 milliGRAM(s) Oral daily  aspirin enteric coated 81 milliGRAM(s) Oral daily  atorvastatin 80 milliGRAM(s) Oral at bedtime  buDESOnide 160 MICROgram(s)/formoterol 4.5 MICROgram(s) Inhaler 2 Puff(s) Inhalation two times a day  chlorhexidine 4% Liquid 1 Application(s) Topical <User Schedule>  clopidogrel Tablet 75 milliGRAM(s) Oral daily  diltiazem    milliGRAM(s) Oral daily  docusate sodium 100 milliGRAM(s) Oral three times a day  enoxaparin Injectable 40 milliGRAM(s) SubCutaneous every 12 hours  furosemide   Injectable 40 milliGRAM(s) IV Push two times a day  levothyroxine 75 MICROGram(s) Oral daily  methylPREDNISolone sodium succinate Injectable 40 milliGRAM(s) IV Push two times a day  metoprolol tartrate 12.5 milliGRAM(s) Oral two times a day  pantoprazole    Tablet 40 milliGRAM(s) Oral before breakfast  piperacillin/tazobactam IVPB.. 3.375 Gram(s) IV Intermittent every 8 hours  polyethylene glycol 3350 17 Gram(s) Oral two times a day  senna 2 Tablet(s) Oral at bedtime    MEDICATIONS  (PRN):  acetaminophen   Tablet .. 650 milliGRAM(s) Oral every 6 hours PRN Moderate Pain (4 - 6)  ondansetron Injectable 4 milliGRAM(s) IV Push every 6 hours PRN Nausea and/or Vomiting          Xrays:  TLC:  OG:  ET tube:                                                                                       ECHO:  [facility] ICU:

## 2019-09-02 NOTE — PHYSICAL THERAPY INITIAL EVALUATION ADULT - SPECIFY REASON(S)
Pt upgraded to CCU for monitoring for NSTEMI/SOB. Pt currently appears in NAD at rest, however declined b/s PT stating she is waiting to speak to rounding MD.
Hold PT eval . Discussed patient current condition with DR Herrera . patient currently in distress at rest Hold PT till patient medical condition improves . will f/u

## 2019-09-02 NOTE — PROGRESS NOTE ADULT - ASSESSMENT
Impression  Acute on Chronic Hypercapnic Respiratory Failure  CHF   COPD stable   Pulmonary Nodule 6mm  NSTEMI now   Hypothyroidism  HTN    Recommendations  -Bipap Q4 on and off PRN  - Continue Duonebs , IV 40 mg  , prednisone 40 mg for 3 days and then 20 mg for 3 days after and then stop  , continue   lasix 40 mg Q 12 hrs follow BNP, VBMP   consider repeat echo   - cardio f/u for NSTEMI  -f/u out pt for Pulm nodule  -Poor prognosis   - Advance directives  on lovenox follow cardiology   KUB   plan as per cardiology

## 2019-09-02 NOTE — PROGRESS NOTE ADULT - ASSESSMENT
76 yo F PMHx of COPD on 1.5L O2 PRN (usually uses at night), CAD, MI (1991) s/p CABG, hypothyroidism 2/2 radioablation for hyperthyroidism, AAA s/p repair, HTN, HLD, PVD s/p fem-pop bypass, GERD, and anxiety admitted for COPD exacerbation. Pt found to have elevated troponin and t-wave inversions v4-v6. Pt upgraded to CCU on 8/31/19.    #NSTEMI  - c/w  Lovenox, c/w aspirin, plavix, metoprolol.  - c/w IV lasix as pt had bibasilar crackles transition to PO tomorrow  - c/w lipitor  - repeat echo ordered  - f/u cardio recs    #Acute on chronic hypercapnic respiratory failure  - Bipap Q4 on and off PRN  - Continue Duonebs , IV 40 mg  , prednisone 40 mg for 3 days and then 20 mg for 3 days after and then stop  , continue   - d/c abx  - f/u out pt in 6 months for Pulm nodule (6mm at right lower lobe)    #Constipation  - senna and colace  - KUB showed no obstruction  - water enema today  - may need disimpaction    #Hypothyroidism  - c/w synthroid  - TSH 0.74    #Hypertension  - Continue diltiazem

## 2019-09-02 NOTE — GOALS OF CARE CONVERSATION - PERSONAL ADVANCE DIRECTIVE - CONVERSATION DETAILS
Pt wishes to be DNR/DNI but does not want to complete MOLST form until she has more time to discuss with her family.

## 2019-09-02 NOTE — PROGRESS NOTE ADULT - SUBJECTIVE AND OBJECTIVE BOX
CHIEF COMPLAINT:    Patient is a 78y old  Female who presents with a chief complaint of SOB (02 Sep 2019 10:04)      INTERVAL HPI/OVERNIGHT EVENTS:    Patient seen and examined at bedside. No acute overnight events occurred.    ROS: All other systems are negative.    Vital Signs:    T(F): 97.8 (09-02-19 @ 12:00), Max: 98.6 (09-02-19 @ 06:00)  HR: 92 (09-02-19 @ 12:00) (78 - 96)  BP: 120/68 (09-02-19 @ 12:00) (110/81 - 159/75)  RR: 24 (09-02-19 @ 12:00) (23 - 35)  SpO2: 98% (09-02-19 @ 12:00) (94% - 100%)  I&O's Summary    01 Sep 2019 07:01  -  02 Sep 2019 07:00  --------------------------------------------------------  IN: 1460 mL / OUT: 1270 mL / NET: 190 mL    02 Sep 2019 07:01  -  02 Sep 2019 13:15  --------------------------------------------------------  IN: 240 mL / OUT: 880 mL / NET: -640 mL      Daily     Daily   CAPILLARY BLOOD GLUCOSE          PHYSICAL EXAM:  GENERAL:  NAD  SKIN: No rashes or lesions  HEENT: Atraumatic. Normocephalic. Anicteric  NECK:  No JVD.   PULMONARY: Clear to ausculation bilaterally. No wheezing. No rales  CVS: Normal S1, S2. Regular rate and rhythm. No murmurs.  ABDOMEN/GI: Soft, Nontender, Nondistended; Bowel sounds are present  EXTREMITIES:  No edema B/L LE.  NEUROLOGIC:  No motor deficit.  PSYCH: Alert & oriented x 3, normal affect    Consultant(s) Notes Reviewed:  [x ] YES  [ ] NO  Care Discussed with Consultants/Other Providers [ x] YES  [ ] NO    LABS:                        11.9   18.88 )-----------( 243      ( 02 Sep 2019 04:35 )             37.5     09-02    141  |  93<L>  |  24<H>  ----------------------------<  116<H>  3.7   |  35<H>  |  0.7    Ca    9.2      02 Sep 2019 04:35  Phos  3.4     09-02  Mg     2.3     09-02    TPro  6.5  /  Alb  4.5  /  TBili  0.7  /  DBili  x   /  AST  75<H>  /  ALT  159<H>  /  AlkPhos  52  09-02      Serum Pro-Brain Natriuretic Peptide: 4849 pg/mL (09-02-19 @ 04:35)    Trop 0.29, CKMB 18.8, , 09-01-19 @ 05:00  Trop 0.17, CKMB 11.1, , 08-31-19 @ 17:39  Trop 0.23, CKMB 15.9, , 08-31-19 @ 07:09  Trop 0.26, CKMB --, CK --, 08-30-19 @ 16:38        RADIOLOGY & ADDITIONAL TESTS:  No new images    Medications:  Standing  ALBUTerol/ipratropium for Nebulization 3 milliLiter(s) Nebulizer every 6 hours  ALPRAZolam 0.5 milliGRAM(s) Oral daily  aspirin enteric coated 81 milliGRAM(s) Oral daily  atorvastatin 80 milliGRAM(s) Oral at bedtime  buDESOnide 160 MICROgram(s)/formoterol 4.5 MICROgram(s) Inhaler 2 Puff(s) Inhalation two times a day  chlorhexidine 4% Liquid 1 Application(s) Topical <User Schedule>  clopidogrel Tablet 75 milliGRAM(s) Oral daily  diltiazem    milliGRAM(s) Oral daily  docusate sodium 100 milliGRAM(s) Oral three times a day  enoxaparin Injectable 40 milliGRAM(s) SubCutaneous every 12 hours  furosemide   Injectable 40 milliGRAM(s) IV Push two times a day  levothyroxine 75 MICROGram(s) Oral daily  metoprolol tartrate 12.5 milliGRAM(s) Oral two times a day  pantoprazole    Tablet 40 milliGRAM(s) Oral before breakfast  polyethylene glycol 3350 17 Gram(s) Oral two times a day  senna 2 Tablet(s) Oral at bedtime    PRN Meds  acetaminophen   Tablet .. 650 milliGRAM(s) Oral every 6 hours PRN  ondansetron Injectable 4 milliGRAM(s) IV Push every 6 hours PRN

## 2019-09-02 NOTE — PROGRESS NOTE ADULT - SUBJECTIVE AND OBJECTIVE BOX
PATIENT:  CARLOS IVERSON  [id number]    CHIEF COMPLAINT:  Patient is a 78y old  Female who presents with a chief complaint of SOB (02 Sep 2019 13:15)      INTERVAL HISTORYOVERNIGHT EVENTS:  Patient seen and examined at bedside. Over last 24hrs, patient self-disimpacted last night, small amount of blood, placed on stool softeners, repeat CBC showed no acute drop in Hb. She also coughed up a small amount of dark red blood this morning. She states she has not had a bowel movement in 3 days. KUB was performed showing no obstruction, patient given enema.     REVIEW OF SYSTEMS:    Constitutional:     [x ] negative [ ] fevers [ ] chills [ ] weight loss [ ] weight gain  HEENT:                  [x ] negative [ ] dry eyes [ ] eye irritation [ ] postnasal drip [ ] nasal congestion  CV:                         [x ] negative  [ ] chest pain [ ] orthopnea [ ] palpitations [ ] murmur  Resp:                     [ x] negative [ ] cough [ ] shortness of breath [ ] dyspnea [ ] wheezing [ ] sputum [ ] hemoptysis  GI:                          [ ] negative [ ] nausea [ ] vomiting [ ] diarrhea [x ] constipation [ ] abd pain [ ] dysphagia   :                        [x ] negative [ ] dysuria [ ] nocturia [ ] hematuria [ ] increased urinary frequency  Musculoskeletal: [x ] negative [ ] back pain [ ] myalgias [ ] arthralgias [ ] fracture  Skin:                       [x ] negative [ ] rash [ ] itch  Neurological:        [ x] negative [ ] headache [ ] dizziness [ ] syncope [ ] weakness [ ] numbness  Psychiatric:           [x ] negative [ ] anxiety [ ] depression  Endocrine:            [x ] negative [ ] diabetes [ ] thyroid problem  Heme/Lymph:      [x ] negative [ ] anemia [ ] bleeding problem  Allergic/Immune: [x ] negative [ ] itchy eyes [ ] nasal discharge [ ] hives [ ] angioedema    [ ] All other systems negative  [ ] Unable to assess ROS because ________.    MEDICATIONS:  MEDICATIONS  (STANDING):  ALBUTerol/ipratropium for Nebulization 3 milliLiter(s) Nebulizer every 6 hours  ALPRAZolam 0.5 milliGRAM(s) Oral daily  aspirin enteric coated 81 milliGRAM(s) Oral daily  atorvastatin 80 milliGRAM(s) Oral at bedtime  buDESOnide 160 MICROgram(s)/formoterol 4.5 MICROgram(s) Inhaler 2 Puff(s) Inhalation two times a day  chlorhexidine 4% Liquid 1 Application(s) Topical <User Schedule>  clopidogrel Tablet 75 milliGRAM(s) Oral daily  diltiazem    milliGRAM(s) Oral daily  docusate sodium 100 milliGRAM(s) Oral three times a day  enoxaparin Injectable 40 milliGRAM(s) SubCutaneous every 12 hours  furosemide   Injectable 40 milliGRAM(s) IV Push two times a day  levothyroxine 75 MICROGram(s) Oral daily  metoprolol tartrate 12.5 milliGRAM(s) Oral two times a day  pantoprazole    Tablet 40 milliGRAM(s) Oral before breakfast  polyethylene glycol 3350 17 Gram(s) Oral two times a day  senna 2 Tablet(s) Oral at bedtime    MEDICATIONS  (PRN):  acetaminophen   Tablet .. 650 milliGRAM(s) Oral every 6 hours PRN Moderate Pain (4 - 6)  ondansetron Injectable 4 milliGRAM(s) IV Push every 6 hours PRN Nausea and/or Vomiting      ALLERGIES:  Allergies    No Known Allergies    Intolerances        OBJECTIVE:  ICU Vital Signs Last 24 Hrs  T(C): 36.6 (02 Sep 2019 12:00), Max: 37 (02 Sep 2019 06:00)  T(F): 97.8 (02 Sep 2019 12:00), Max: 98.6 (02 Sep 2019 06:00)  HR: 92 (02 Sep 2019 12:00) (78 - 96)  BP: 120/68 (02 Sep 2019 12:00) (110/81 - 159/75)  BP(mean): 92 (02 Sep 2019 12:00) (80 - 95)  ABP: --  ABP(mean): --  RR: 24 (02 Sep 2019 12:00) (23 - 35)  SpO2: 98% (02 Sep 2019 12:00) (94% - 100%)      Adult Advanced Hemodynamics Last 24 Hrs  CVP(mm Hg): --  CVP(cm H2O): --  CO: --  CI: --  PA: --  PA(mean): --  PCWP: --  SVR: --  SVRI: --  PVR: --  PVRI: --  CAPILLARY BLOOD GLUCOSE        CAPILLARY BLOOD GLUCOSE      POCT Blood Glucose.: 202 mg/dL (31 Aug 2019 20:30)    I&O's Summary    01 Sep 2019 07:01  -  02 Sep 2019 07:00  --------------------------------------------------------  IN: 1460 mL / OUT: 1270 mL / NET: 190 mL    02 Sep 2019 07:01  -  02 Sep 2019 13:47  --------------------------------------------------------  IN: 240 mL / OUT: 880 mL / NET: -640 mL      Daily     Daily     Physical exam  General:AOx3  HEENT:   kandy             Lymph Nodes: No cervical LN   Lungs: Bilateral BS  Cardiovascular: Regular   Abdomen: Soft, Positive BS; last BM 3 days ago  Extremities: No clubbing   Skin: warm   Neurological: no focal deficit   Musculoskeletal: move all ext   Incisions: None  Tubes: None    LABS:  ABG - ( 31 Aug 2019 22:41 )  pH, Arterial: 7.40  pH, Blood: x     /  pCO2: 56    /  pO2: 128   / HCO3: 35    / Base Excess: 8.3   /  SaO2: 98                                      11.9   18.88 )-----------( 243      ( 02 Sep 2019 04:35 )             37.5     09-02    141  |  93<L>  |  24<H>  ----------------------------<  116<H>  3.7   |  35<H>  |  0.7    Ca    9.2      02 Sep 2019 04:35  Phos  3.4     09-02  Mg     2.3     09-02    TPro  6.5  /  Alb  4.5  /  TBili  0.7  /  DBili  x   /  AST  75<H>  /  ALT  159<H>  /  AlkPhos  52  09-02    LIVER FUNCTIONS - ( 02 Sep 2019 04:35 )  Alb: 4.5 g/dL / Pro: 6.5 g/dL / ALK PHOS: 52 U/L / ALT: 159 U/L / AST: 75 U/L / GGT: x               CARDIAC MARKERS ( 01 Sep 2019 05:00 )  x     / 0.29 ng/mL / 160 U/L / x     / 18.8 ng/mL  CARDIAC MARKERS ( 31 Aug 2019 17:39 )  x     / 0.17 ng/mL / 145 U/L / x     / 11.1 ng/mL          TELEMETRY: No events    EKG: < from: 12 Lead ECG (09.02.19 @ 07:16) >    Diagnosis Line Normal sinus rhythmwith sinus arrhythmia  Voltage criteria for left ventricular hypertrophy  ST & T wave abnormality, consider inferior ischemia  ST & T wave abnormality, consider anterolateral ischemia  Abnormal ECG    < end of copied text >      IMAGING:  < from: Xray Kidney Ureter Bladder (09.02.19 @ 11:22) >    IMPRESSION:  Nonobstructive bowel gas pattern.    < end of copied text >    < from: Xray Chest 1 View- PORTABLE-Routine (09.02.19 @ 04:53) >  Impression:      Bibasilar opacities/right pleural effusion, decreased.    < end of copied text >

## 2019-09-03 NOTE — PROGRESS NOTE ADULT - ASSESSMENT
History    CAD CABG  COPD  EXAC      ELEvated trop    plan cont medical therapy   discussed with patient who agrees        cont diuresis     pulm follow

## 2019-09-03 NOTE — PROGRESS NOTE ADULT - SUBJECTIVE AND OBJECTIVE BOX
INTERVAL HISTORY: No overnight events.  	  MEDICATIONS:  acetaminophen   Tablet .. 650 milliGRAM(s) Oral every 6 hours PRN  ALBUTerol/ipratropium for Nebulization 3 milliLiter(s) Nebulizer every 6 hours  ALPRAZolam 0.25 milliGRAM(s) Oral every 12 hours  aspirin enteric coated 81 milliGRAM(s) Oral daily  atorvastatin 80 milliGRAM(s) Oral at bedtime  buDESOnide 160 MICROgram(s)/formoterol 4.5 MICROgram(s) Inhaler 2 Puff(s) Inhalation two times a day  chlorhexidine 4% Liquid 1 Application(s) Topical <User Schedule>  clopidogrel Tablet 75 milliGRAM(s) Oral daily  diltiazem    milliGRAM(s) Oral daily  docusate sodium 100 milliGRAM(s) Oral three times a day  furosemide   Injectable 40 milliGRAM(s) IV Push two times a day  levothyroxine 75 MICROGram(s) Oral daily  metoprolol tartrate 12.5 milliGRAM(s) Oral two times a day  ondansetron Injectable 4 milliGRAM(s) IV Push every 6 hours PRN  pantoprazole    Tablet 40 milliGRAM(s) Oral before breakfast  polyethylene glycol 3350 17 Gram(s) Oral two times a day  predniSONE   Tablet 40 milliGRAM(s) Oral daily  senna 2 Tablet(s) Oral at bedtime      REVIEW OF SYSTEMS:  CONSTITUTIONAL: No fever, weight loss, or fatigue  NECK: No pain or stiffness  RESPIRATORY: No cough, wheezing, chills or hemoptysis; No shortness of breath  CARDIOVASCULAR: No chest pain, palpitations, dizziness, or leg swelling  GASTROINTESTINAL: No abdominal or epigastric pain. No nausea, vomiting, or hematemesis; No diarrhea or constipation. No melena or hematochezia.  GENITOURINARY: No dysuria, frequency, hematuria, or incontinence  NEUROLOGICAL: No headaches, memory loss, loss of strength, numbness, or tremors  SKIN: No itching, burning, rashes, or lesions   ENDOCRINE: No heat or cold intolerance; No hair loss  MUSCULOSKELETAL: No joint pain or swelling; No muscle, back, or extremity pain  HEME/LYMPH: No easy bruising, or bleeding gums    PHYSICAL EXAM:  T(C): 36.1 (09-03-19 @ 08:00), Max: 36.8 (09-02-19 @ 20:00)  HR: 82 (09-03-19 @ 08:00) (64 - 108)  BP: 129/55 (09-03-19 @ 08:00) (119/69 - 174/73)  RR: 21 (09-03-19 @ 04:00) (18 - 32)  SpO2: 99% (09-03-19 @ 08:00) (96% - 100%)  Wt(kg): --  I&O's Summary    02 Sep 2019 07:01  -  03 Sep 2019 07:00  --------------------------------------------------------  IN: 360 mL / OUT: 2860 mL / NET: -2500 mL          GENERAL: NAD, well-groomed, well-developed  HEAD:  Atraumatic, Normocephalic  NECK: Supple, No JVD, Normal thyroid  NERVOUS SYSTEM:  Alert & Oriented X3, Good concentration  CHEST/LUNG: Clear to percussion bilaterally; No rales, rhonchi, wheezing, or rubs  HEART: Regular rate and rhythm; No murmurs, rubs, or gallops  ABDOMEN: Soft, Nontender, Nondistended; Bowel sounds present  EXTREMITIES:  2+ Peripheral Pulses, No clubbing, cyanosis, or edema  SKIN: No rashes or lesions    LABS:	 	                              12.5   13.39 )-----------( 230      ( 03 Sep 2019 04:38 )             38.0     09-03    138  |  88<L>  |  25<H>  ----------------------------<  105<H>  3.4<L>   |  38<H>  |  0.7    Ca    9.3      03 Sep 2019 04:38  Phos  3.4     09-02  Mg     2.5     09-03    TPro  6.7  /  Alb  4.5  /  TBili  0.6  /  DBili  x   /  AST  42<H>  /  ALT  115<H>  /  AlkPhos  53  09-03    proBNP:   Lipid Profile:

## 2019-09-03 NOTE — CHART NOTE - NSCHARTNOTEFT_GEN_A_CORE
ICU DOWNGRADE NOTE:    78y Female transferred to floor from CCU    Patient is a 78y old Female who presents with a chief complaint of SOB (03 Sep 2019 10:17)    The patient is currently admitted for the primary diagnosis of COPD (chronic obstructive pulmonary disease)    The patient was admitted to the unit for 2 Days.    The patient was never intubated, and was never on pressors.    Indwelling vascular catheters: none    Urinary Catheter: none    Disposition: Home    Code Status: Full, patient wants to be DNR/DNI but not willing to sign MOLST until she discusses with family      CCU COURSE OF EVENTS:  77 yo F PMHx of COPD on 1.5L O2 PRN (usually uses at night), CAD, MI (1991) s/p CABG, hypothyroidism 2/2 radioablation for hyperthyroidism, AAA s/p repair, HTN, HLD, PVD s/p fem-pop bypass, GERD, and anxiety admitted for COPD exacerbation. Pt found to have elevated troponin and t-wave inversions v4-v6. Pt upgraded to CCU on 8/31/19. Patient was placed on asa/plavix/lovenox.     Current workup in progress: ICU DOWNGRADE NOTE:    78y Female transferred to floor from CCU    Patient is a 78y old Female who presents with a chief complaint of SOB (03 Sep 2019 10:17)    The patient is currently admitted for the primary diagnosis of COPD (chronic obstructive pulmonary disease)    The patient was admitted to the unit for 2 Days.    The patient was never intubated, and was never on pressors.    Indwelling vascular catheters: none    Urinary Catheter: none    Disposition: Home    Code Status: Full, patient wants to be DNR/DNI but not willing to sign MOLST until she discusses with family      CCU COURSE OF EVENTS:  77 yo F PMHx of COPD on 1.5L O2 PRN (usually uses at night), CAD, MI (1991) s/p CABG, hypothyroidism 2/2 radioablation for hyperthyroidism, AAA s/p repair, HTN, HLD, PVD s/p fem-pop bypass, GERD, and anxiety admitted for COPD exacerbation. Pt found to have elevated troponin and t-wave inversions v4-v6. Pt upgraded to CCU on 8/31/19. Patient was placed on asa/plavix/lovenox. On 9/1 patient self-disimpacted herself causing injury to rectal mucosa and subsequent rectal bleed. Rectal bleed was self limited. Patient was given enema and had normal bowel movements. Treated COPD exacerbation, and per cardiologist Dr. Giles patient can be downgraded to telemetry as patient is not currently a cardiac cath candidate.         Current workup in progress:      #NSTEMI  - c/w  Lovenox qd, c/w aspirin, plavix, metoprolol.  - Started on PO lasix 40mg BID today (d/c IV)  - c/w lipitor  - repeat TTE shows Left ventricular ejection fraction, by visual estimation, is 45 to 50%.  Mildly decreased global left ventricular systolic function.  - on telemetry; possible cardiac cath in future but not currently a candidate; c/w medical therapy    #Hyperkalemia  - Resolved  - Received 40mEq KCl PO today; f/u BMP    #Acute on chronic hypercapnic respiratory failure  - Bipap Q4 on and off PRN  - Continue Duonebs , IV 40 mg  , prednisone 40 mg for 3 days (day 1/3 currently) and then 20 mg for 3 days after and then stop  - d/c abx  - f/u out pt in 6 months for Pulm nodule (6mm at right lower lobe)    #Constipation  - senna and colace  - KUB showed no obstruction  - water enema 9/2  - may need disimpaction    #Hypothyroidism  - c/w synthroid  - TSH 0.74    #Hypertension  - Continue diltiazem     #Anxiety  - Continue xanax

## 2019-09-03 NOTE — PROGRESS NOTE ADULT - ASSESSMENT
Impression  Acute on Chronic Hypercapnic Respiratory Failure  CHF   COPD stable   Pulmonary Nodule 6mm  NSTEMI now   Hypothyroidism  HTN    Recommendations  -Bipap Q4 on and off PRN  - Continue Duonebs , IV 40 mg  , prednisone 40 mg for 3 days and then 20 mg for 3 days after and then stop  , continue   lasix 40 mg Q 12 hrs follow BNP, VBMP   consider repeat echo   - cardio f/u for NSTEMI  -f/u out pt for Pulm nodule  -Poor prognosis     - Advance directives  on lovenox follow cardiology   KUB   plan as per cardiology Impression  Acute on Chronic Hypercapnic Respiratory Failure  CHF   COPD stable   Pulmonary Nodule 6mm  NSTEMI now   Hypothyroidism  HTN    Recommendations  -Bipap Q4 on and off PRN  - Continue Duonebs , IV 40 mg  , prednisone 40 mg for 3 days and then 20 mg for 3 days after and then stop  , continue   lasix 40 mg Q 12 hrs follow BNP, VBMP   consider repeat echo   - cardio f/u for NSTEMI  -f/u out pt for Pulm nodule  -Poor prognosis   d/c warren   - Advance directives  on lovenox follow cardiology   KUB   plan as per cardiology

## 2019-09-03 NOTE — CONSULT NOTE ADULT - SUBJECTIVE AND OBJECTIVE BOX
Patient is a 78y old  Female who presents with a chief complaint of SOB (30 Aug 2019 08:59)      HPI:  76 y/o F with PMH of COPD on 2L O2 24/7, CAD, MI (1991) s/p CABG, hypothyroidism 2/2 radioablation for hyperthyroidism, AAA s/p repair, HTN, HLD, PVD s/p fem-pop bypass, GERD, and anxiety presented for shortness of breath. She says that the sob started 3 weeks ago and it has been progressing since. She was able to ambulate and did not increase her O2 but was using her inhalers more frequently up to 6 times a day. She visited her PMD for a routine blood work and everything was within normal limits except a high blood pressure of 200/90 (amlodipine 5mg was started). Today after doing groceries, patient felt a substernal chest pain, 10/10, radiating to right shoulder and left arm with a squeezing pain in her upper abdomen especially when she breathes, associated with nausea. This was associated with severe shortness of breath that limited the ambulation and prompted the visit to the ED. She has been having a dry cough and feels phlegm stuck in her throat and she cannot expectorate it.   In ED, Code STEMI was called which was later cancelled by cardiology. CXR showed hyperinflation of both lungs with a questionable right lower lobe opacity. Her SaO2 was 95% on 2L O2, VBG was unremarkable (29 Aug 2019 20:18)      PAST MEDICAL & SURGICAL HISTORY:  History of myocardial infarction  Anxiety  Gastroesophageal reflux disease without esophagitis  Essential hypertension  Abdominal aortic aneurysm (AAA) without rupture  Chronic obstructive pulmonary disease with acute exacerbation  Peripheral vascular disease  Coronary artery disease involving native coronary artery of native heart without angina pectoris  Postablative hypothyroidism  Graves disease  MI (myocardial infarction)  S/P CABG (coronary artery bypass graft)  History of femoropopliteal bypass  After cataract, bilateral: R - 10/13  L - 8/14  H/O angioplasty: R femoral  H/O: hysterectomy: partial  History of cholecystectomy      PREVIOUS DIAGNOSTIC TESTING:      ECHO  FINDINGS:    STRESS  FINDINGS:    CATHETERIZATION  FINDINGS:    MEDICATIONS  (STANDING):  ALBUTerol/ipratropium for Nebulization 3 milliLiter(s) Nebulizer every 6 hours  ALPRAZolam 0.25 milliGRAM(s) Oral two times a day  azithromycin  IVPB 500 milliGRAM(s) IV Intermittent every 24 hours  buDESOnide 160 MICROgram(s)/formoterol 4.5 MICROgram(s) Inhaler 2 Puff(s) Inhalation two times a day  chlorhexidine 4% Liquid 1 Application(s) Topical <User Schedule>  clopidogrel Tablet 75 milliGRAM(s) Oral daily  diltiazem    milliGRAM(s) Oral daily  docusate sodium 100 milliGRAM(s) Oral three times a day  enoxaparin Injectable 40 milliGRAM(s) SubCutaneous daily  levothyroxine 75 MICROGram(s) Oral daily  methylPREDNISolone sodium succinate Injectable 60 milliGRAM(s) IV Push every 12 hours  pantoprazole    Tablet 40 milliGRAM(s) Oral before breakfast    MEDICATIONS  (PRN):  ALBUTerol    90 MICROgram(s) HFA Inhaler 2 Puff(s) Inhalation every 4 hours PRN Shortness of Breath and/or Wheezing      FAMILY HISTORY:  No pertinent family history in first degree relatives      SOCIAL HISTORY:  CIGARETTES:    ALCOHOL:    REVIEW OF SYSTEMS:  CONSTITUTIONAL: No fever, weight loss, or fatigue  NECK: No pain or stiffness  RESPIRATORY: No cough, wheezing, chills or hemoptysis; No shortness of breath  CARDIOVASCULAR: No chest pain, palpitations, dizziness, or leg swelling  GASTROINTESTINAL: No abdominal or epigastric pain. No nausea, vomiting, or hematemesis; No diarrhea or constipation. No melena or hematochezia.  GENITOURINARY: No dysuria, frequency, hematuria, or incontinence  NEUROLOGICAL: No headaches, memory loss, loss of strength, numbness, or tremors  SKIN: No itching, burning, rashes, or lesions   ENDOCRINE: No heat or cold intolerance; No hair loss  MUSCULOSKELETAL: No joint pain or swelling; No muscle, back, or extremity pain  HEME/LYMPH: No easy bruising, or bleeding gums          Vital Signs Last 24 Hrs  T(C): 36.5 (30 Aug 2019 09:03), Max: 36.5 (30 Aug 2019 09:03)  T(F): 97.7 (30 Aug 2019 09:03), Max: 97.7 (30 Aug 2019 09:03)  HR: 78 (30 Aug 2019 09:03) (77 - 96)  BP: 135/62 (30 Aug 2019 09:03) (135/62 - 170/68)  BP(mean): --  RR: 20 (30 Aug 2019 09:03) (20 - 20)  SpO2: 100% (30 Aug 2019 09:03) (95% - 100%)        PHYSICAL EXAM:  GENERAL: NAD, well-groomed, well-developed  HEAD:  Atraumatic, Normocephalic  NECK: Supple, No JVD, Normal thyroid  NERVOUS SYSTEM:  Alert & Oriented X3, Good concentration  CHEST/LUNG: Clear to percussion bilaterally; No rales, rhonchi, wheezing, or rubs  HEART: Regular rate and rhythm; No murmurs, rubs, or gallops  ABDOMEN: Soft, Nontender, Nondistended; Bowel sounds present  EXTREMITIES:  2+ Peripheral Pulses, No clubbing, cyanosis, or edema  SKIN: No rashes or lesions    INTERPRETATION OF TELEMETRY:    ECG:    I&O's Detail    29 Aug 2019 07:01  -  30 Aug 2019 07:00  --------------------------------------------------------  IN:  Total IN: 0 mL    OUT:    Voided: 225 mL  Total OUT: 225 mL    Total NET: -225 mL          LABS:                        11.6   6.21  )-----------( 250      ( 30 Aug 2019 05:36 )             36.4     08-30    141  |  102  |  15  ----------------------------<  167<H>  4.9   |  24  |  0.6<L>    Ca    9.4      30 Aug 2019 05:36  Mg     2.3     08-30    TPro  7.7  /  Alb  5.1  /  TBili  0.5  /  DBili  x   /  AST  20  /  ALT  13  /  AlkPhos  64  08-29    CARDIAC MARKERS ( 30 Aug 2019 05:36 )  x     / 0.25 ng/mL / x     / x     / x      CARDIAC MARKERS ( 30 Aug 2019 00:21 )  x     / 0.19 ng/mL / x     / x     / x      CARDIAC MARKERS ( 29 Aug 2019 17:35 )  x     / <0.01 ng/mL / x     / x     / x              I&O's Summary    29 Aug 2019 07:01  -  30 Aug 2019 07:00  --------------------------------------------------------  IN: 0 mL / OUT: 225 mL / NET: -225 mL        RADIOLOGY & ADDITIONAL STUDIES:
CARLOS IVERSON  78y, Female  Allergy: No Known Allergies      HPI:  76 y/o F with PMH of COPD on 2L O2 24/7, CAD, MI (1991) s/p CABG, hypothyroidism 2/2 radioablation for hyperthyroidism, AAA s/p repair, HTN, HLD, PVD s/p fem-pop bypass, GERD, and anxiety presented for shortness of breath. She says that the sob started 3 weeks ago and it has been progressing since. She was able to ambulate and did not increase her O2 but was using her inhalers more frequently up to 6 times a day. She visited her PMD for a routine blood work and everything was within normal limits except a high blood pressure of 200/90 (amlodipine 5mg was started). Today after doing groceries, patient felt a substernal chest pain, 10/10, radiating to right shoulder and left arm with a squeezing pain in her upper abdomen especially when she breathes, associated with nausea. This was associated with severe shortness of breath that limited the ambulation and prompted the visit to the ED. She has been having a dry cough and feels phlegm stuck in her throat and she cannot expectorate it.   In ED, Code STEMI was called which was later cancelled by cardiology. CXR showed hyperinflation of both lungs with a questionable right lower lobe opacity. Her SaO2 was 95% on 2L O2, VBG was unremarkable (29 Aug 2019 20:18)    FAMILY HISTORY:  No pertinent family history in first degree relatives    PAST MEDICAL & SURGICAL HISTORY:  History of myocardial infarction  Anxiety  Gastroesophageal reflux disease without esophagitis  Essential hypertension  Abdominal aortic aneurysm (AAA) without rupture  Chronic obstructive pulmonary disease with acute exacerbation  Peripheral vascular disease  Coronary artery disease involving native coronary artery of native heart without angina pectoris  Postablative hypothyroidism  Graves disease  MI (myocardial infarction)  S/P CABG (coronary artery bypass graft)  History of femoropopliteal bypass  After cataract, bilateral: R - 10/13  L - 8/14  H/O angioplasty: R femoral  H/O: hysterectomy: partial  History of cholecystectomy        VITALS:  T(F): 97.4, Max: 98.3 (09-02-19 @ 20:00)  HR: 64  BP: 174/73  RR: 21Vital Signs Last 24 Hrs  T(C): 36.3 (03 Sep 2019 06:00), Max: 36.8 (02 Sep 2019 20:00)  T(F): 97.4 (03 Sep 2019 06:00), Max: 98.3 (02 Sep 2019 20:00)  HR: 64 (03 Sep 2019 06:00) (64 - 108)  BP: 174/73 (03 Sep 2019 06:00) (119/69 - 174/73)  BP(mean): 118 (03 Sep 2019 06:00) (80 - 118)  RR: 21 (03 Sep 2019 04:00) (18 - 35)  SpO2: 100% (03 Sep 2019 06:00) (96% - 100%)    TESTS & MEASUREMENTS:                        12.5   13.39 )-----------( 230      ( 03 Sep 2019 04:38 )             38.0     09-03    138  |  88<L>  |  25<H>  ----------------------------<  105<H>  3.4<L>   |  38<H>  |  0.7    Ca    9.3      03 Sep 2019 04:38  Phos  3.4     09-02  Mg     2.5     09-03    TPro  6.7  /  Alb  4.5  /  TBili  0.6  /  DBili  x   /  AST  42<H>  /  ALT  115<H>  /  AlkPhos  53  09-03    LIVER FUNCTIONS - ( 03 Sep 2019 04:38 )  Alb: 4.5 g/dL / Pro: 6.7 g/dL / ALK PHOS: 53 U/L / ALT: 115 U/L / AST: 42 U/L / GGT: x             Culture - Blood (collected 08-29-19 @ 19:00)  Source: .Blood Blood  Preliminary Report (08-31-19 @ 04:00):    No growth to date.    Culture - Blood (collected 08-29-19 @ 19:00)  Source: .Blood Blood  Preliminary Report (08-31-19 @ 04:00):    No growth to date.            RADIOLOGY & ADDITIONAL TESTS:    ANTIBIOTICS:
HPI:  76 y/o F with PMH of COPD on 2L O2 24/7, CAD, MI (1991) s/p CABG, hypothyroidism 2/2 radioablation for hyperthyroidism, AAA s/p repair, HTN, HLD, PVD s/p fem-pop bypass, GERD, and anxiety presented for shortness of breath. She says that the sob started 3 weeks ago and it has been progressing since. She was able to ambulate and did not increase her O2 but was using her inhalers more frequently up to 6 times a day. She visited her PMD for a routine blood work and everything was within normal limits except a high blood pressure of 200/90 (amlodipine 5mg was started). Today after doing groceries, patient felt a substernal chest pain, 10/10, radiating to right shoulder and left arm with a squeezing pain in her upper abdomen especially when she breathes, associated with nausea. This was associated with severe shortness of breath that limited the ambulation and prompted the visit to the ED. She has been having a dry cough and feels phlegm stuck in her throat and she cannot expectorate it.   In ED, Code STEMI was called which was later cancelled by cardiology. CXR showed hyperinflation of both lungs with a questionable right lower lobe opacity. Her SaO2 was 95% on 2L O2, VBG was unremarkable (29 Aug 2019 20:18)      PAST MEDICAL & SURGICAL HISTORY:  History of myocardial infarction  Anxiety  Gastroesophageal reflux disease without esophagitis  Essential hypertension  Abdominal aortic aneurysm (AAA) without rupture  Chronic obstructive pulmonary disease with acute exacerbation  Peripheral vascular disease  Coronary artery disease involving native coronary artery of native heart without angina pectoris  Postablative hypothyroidism  Graves disease  MI (myocardial infarction)  S/P CABG (coronary artery bypass graft)  History of femoropopliteal bypass  After cataract, bilateral: R - 10/13  L - 8/14  H/O angioplasty: R femoral  H/O: hysterectomy: partial  History of cholecystectomy      Hospital Course:  She is hospitalized with SOB. treated for COPD. she ruled in for NSTEMI. She has apulm nodule.  She is deconditioned and weak.  TODAY'S SUBJECTIVE & REVIEW OF SYMPTOMS:     Constitutional WNL   Cardio WNL   Resp WNL   GI WNL  Heme WNL  Endo WNL  Skin WNL  MSK WNL  Neuro WNL  Cognitive WNL  Psych WNL      MEDICATIONS  (STANDING):  ALBUTerol/ipratropium for Nebulization 3 milliLiter(s) Nebulizer every 6 hours  ALPRAZolam 0.25 milliGRAM(s) Oral two times a day  aspirin enteric coated 81 milliGRAM(s) Oral daily  azithromycin  IVPB 500 milliGRAM(s) IV Intermittent every 24 hours  buDESOnide 160 MICROgram(s)/formoterol 4.5 MICROgram(s) Inhaler 2 Puff(s) Inhalation two times a day  chlorhexidine 4% Liquid 1 Application(s) Topical <User Schedule>  clopidogrel Tablet 75 milliGRAM(s) Oral daily  diltiazem    milliGRAM(s) Oral daily  docusate sodium 100 milliGRAM(s) Oral three times a day  enoxaparin Injectable 40 milliGRAM(s) SubCutaneous daily  levothyroxine 75 MICROGram(s) Oral daily  methylPREDNISolone sodium succinate Injectable 60 milliGRAM(s) IV Push every 12 hours  pantoprazole    Tablet 40 milliGRAM(s) Oral before breakfast    MEDICATIONS  (PRN):  ALBUTerol    90 MICROgram(s) HFA Inhaler 2 Puff(s) Inhalation every 4 hours PRN Shortness of Breath and/or Wheezing  ondansetron Injectable 4 milliGRAM(s) IV Push every 6 hours PRN Nausea and/or Vomiting      FAMILY HISTORY:  No pertinent family history in first degree relatives      Allergies    No Known Allergies    Intolerances        SOCIAL HISTORY:    [  ] Etoh  [  ] Smoking  [  ] Substance abuse     Home Environment:  [  ] Home Alone  [  ] Lives with Family  [  ] Home Health Aid    Dwelling:  [  ] Apartment  [  ] Private House  [  ] Adult Home  [  ] Skilled Nursing Facility      [  ] Short Term  [  ] Long Term  [  ] Stairs       Elevator [  ]    FUNCTIONAL STATUS PTA: (Check all that apply)  Ambulation: [   ]Independent    [  ] Dependent     [  ] Non-Ambulatory  Assistive Device: [  ] SA Cane  [  ]  Q Cane  [  ] Walker  [  ]  Wheelchair  ADL : [  ] Independent  [  ]  Dependent       Vital Signs Last 24 Hrs  T(C): 36.5 (30 Aug 2019 09:03), Max: 36.5 (30 Aug 2019 09:03)  T(F): 97.7 (30 Aug 2019 09:03), Max: 97.7 (30 Aug 2019 09:03)  HR: 78 (30 Aug 2019 09:03) (77 - 96)  BP: 135/62 (30 Aug 2019 09:03) (135/62 - 170/68)  BP(mean): --  RR: 20 (30 Aug 2019 09:03) (20 - 20)  SpO2: 100% (30 Aug 2019 09:03) (95% - 100%)      PHYSICAL EXAM: Alert & Oriented X3  GENERAL: NAD, well-groomed, well-developed  HEAD:  Atraumatic, Normocephalic  EYES: EOMI, PERRLA, conjunctiva and sclera clear  NECK: Supple, No JVD, Normal thyroid  CHEST/LUNG: Clear to percussion bilaterally; No rales, rhonchi, wheezing, or rubs  HEART: Regular rate and rhythm; No murmurs, rubs, or gallops  ABDOMEN: Soft, Nontender, Nondistended; Bowel sounds present  EXTREMITIES:  2+ Peripheral Pulses, No clubbing, cyanosis, or edema    NERVOUS SYSTEM:  Cranial Nerves 2-12 intact [  ] Abnormal  [  ]  ROM: WFL all extremities [  ]  Abnormal [  ]  Motor Strength: WFL all extremities  [  ]  Abnormal [ x ] 5-/5 motor  Sensation: intact to light touch [  ] Abnormal [  ]  Reflexes: Symmetric [  ]  Abnormal [  ]    FUNCTIONAL STATUS:  Bed Mobility: Independent [  ]  Supervision [  ]  Needs Assistance [  ]  N/A [  ]  Transfers: Independent [  ]  Supervision [  ]  Needs Assistance [  ]  N/A [  ]   Ambulation: Independent [  ]  Supervision [  ]  Needs Assistance [  ]  N/A [  ]  ADL: Independent [  ] Requires Assistance [  ] N/A [  ]      LABS:                        11.6   6.21  )-----------( 250      ( 30 Aug 2019 05:36 )             36.4     08-30    141  |  102  |  15  ----------------------------<  167<H>  4.9   |  24  |  0.6<L>    Ca    9.4      30 Aug 2019 05:36  Mg     2.3     08-30    TPro  7.7  /  Alb  5.1  /  TBili  0.5  /  DBili  x   /  AST  20  /  ALT  13  /  AlkPhos  64  08-29          RADIOLOGY & ADDITIONAL STUDIES:    Assesment:
Patient is a 78y old  Female who presents with a chief complaint of SOB (29 Aug 2019 20:18)      HPI:  76 y/o F with PMH of COPD on 2L O2 24/7, CAD, MI (1991) s/p CABG, hypothyroidism 2/2 radioablation for hyperthyroidism, AAA s/p repair, HTN, HLD, PVD s/p fem-pop bypass, GERD, and anxiety presented for shortness of breath. She says that the sob started 3 weeks ago and it has been progressing since. She was able to ambulate and did not increase her O2 but was using her inhalers more frequently up to 6 times a day. She visited her PMD for a routine blood work and everything was within normal limits except a high blood pressure of 200/90 (amlodipine 5mg was started). Today after doing groceries, patient felt a substernal chest pain, 10/10, radiating to right shoulder and left arm with a squeezing pain in her upper abdomen especially when she breathes, associated with nausea. This was associated with severe shortness of breath that limited the ambulation and prompted the visit to the ED. She has been having a dry cough and feels phlegm stuck in her throat and she cannot expectorate it.   In ED, Code STEMI was called which was later cancelled by cardiology. CXR showed hyperinflation of both lungs with a questionable right lower lobe opacity. Her SaO2 was 95% on 2L O2, VBG was unremarkable (29 Aug 2019 20:18)      PAST MEDICAL & SURGICAL HISTORY:  History of myocardial infarction  Anxiety  Gastroesophageal reflux disease without esophagitis  Essential hypertension  Abdominal aortic aneurysm (AAA) without rupture  Chronic obstructive pulmonary disease with acute exacerbation  Peripheral vascular disease  Coronary artery disease involving native coronary artery of native heart without angina pectoris  Postablative hypothyroidism  Graves disease  MI (myocardial infarction)  S/P CABG (coronary artery bypass graft)  History of femoropopliteal bypass  After cataract, bilateral: R - 10/13  L - 8/14  H/O angioplasty: R femoral  H/O: hysterectomy: partial  History of cholecystectomy      SOCIAL HX:   Smoking                         ETOH                            Other    FAMILY HISTORY:  No pertinent family history in first degree relatives  .  No cardiovascular or pulmonary family history     Review of System:  See HPI    Allergies    No Known Allergies    Intolerances          PHYSICAL EXAM  Vital Signs Last 24 Hrs  T(C): 35.1 (30 Aug 2019 00:12), Max: 36.2 (29 Aug 2019 20:30)  T(F): 95.1 (30 Aug 2019 00:12), Max: 97.1 (29 Aug 2019 20:30)  HR: 93 (30 Aug 2019 05:15) (77 - 96)  BP: 150/61 (30 Aug 2019 05:15) (139/65 - 170/68)  BP(mean): --  RR: 20 (30 Aug 2019 00:12) (20 - 20)  SpO2: 97% (30 Aug 2019 05:15) (95% - 99%)    General: In NAD  HEENT: FRANK             Lymphatic system: No cervical LN     Lungs: Gee BS  Cardiovascular: Regular  Gastrointestinal: Soft.  + BS   Musculoskeletal: No Clubbing.  Full range of motion.. Moves all extremities  Skin: Warm.  Intact  Neurological: No motor or sensory deficit       LABS:                          11.6   6.21  )-----------( 250      ( 30 Aug 2019 05:36 )             36.4                                               08-30    141  |  102  |  15  ----------------------------<  167<H>  4.9   |  24  |  0.6<L>    Ca    9.4      30 Aug 2019 05:36  Mg     2.3     08-30    TPro  7.7  /  Alb  5.1  /  TBili  0.5  /  DBili  x   /  AST  20  /  ALT  13  /  AlkPhos  64  08-29                                                 CARDIAC MARKERS ( 30 Aug 2019 05:36 )  x     / 0.25 ng/mL / x     / x     / x      CARDIAC MARKERS ( 30 Aug 2019 00:21 )  x     / 0.19 ng/mL / x     / x     / x      CARDIAC MARKERS ( 29 Aug 2019 17:35 )  x     / <0.01 ng/mL / x     / x     / x                                                LIVER FUNCTIONS - ( 29 Aug 2019 17:35 )  Alb: 5.1 g/dL / Pro: 7.7 g/dL / ALK PHOS: 64 U/L / ALT: 13 U/L / AST: 20 U/L / GGT: x                                                                                                MEDICATIONS  (STANDING):  ALBUTerol/ipratropium for Nebulization 3 milliLiter(s) Nebulizer every 6 hours  ALPRAZolam 0.25 milliGRAM(s) Oral two times a day  azithromycin  IVPB 500 milliGRAM(s) IV Intermittent every 24 hours  buDESOnide 160 MICROgram(s)/formoterol 4.5 MICROgram(s) Inhaler 2 Puff(s) Inhalation two times a day  chlorhexidine 4% Liquid 1 Application(s) Topical <User Schedule>  clopidogrel Tablet 75 milliGRAM(s) Oral daily  diltiazem    milliGRAM(s) Oral daily  docusate sodium 100 milliGRAM(s) Oral three times a day  enoxaparin Injectable 40 milliGRAM(s) SubCutaneous daily  levothyroxine 75 MICROGram(s) Oral daily  methylPREDNISolone sodium succinate Injectable 60 milliGRAM(s) IV Push every 12 hours  pantoprazole    Tablet 40 milliGRAM(s) Oral before breakfast    MEDICATIONS  (PRN):  ALBUTerol    90 MICROgram(s) HFA Inhaler 2 Puff(s) Inhalation every 4 hours PRN Shortness of Breath and/or Wheezing

## 2019-09-03 NOTE — CONSULT NOTE ADULT - ASSESSMENT
78 yo F PMHx of COPD on 1.5L O2 PRN (usually uses at night), CAD, MI (1991) s/p CABG, hypothyroidism 2/2 radioablation for hyperthyroidism, AAA s/p repair, HTN, HLD, PVD s/p fem-pop bypass, GERD, and anxiety admitted for COPD exacerbation. Pt found to have elevated troponin and t-wave inversions v4-v6. Pt upgraded to CCU on 8/31/19 for NSTEMI    IMPRESSION:  No sepsis  No PNA  WBC 13.3 ( on steroids )  BCX 8/29 NG  CXR/ CT not compatible with PNA    RECOMMENDATIONS:  No ABx

## 2019-09-03 NOTE — PROGRESS NOTE ADULT - ASSESSMENT
76 yo F PMHx of COPD on 1.5L O2 PRN (usually uses at night), CAD, MI (1991) s/p CABG, hypothyroidism 2/2 radioablation for hyperthyroidism, AAA s/p repair, HTN, HLD, PVD s/p fem-pop bypass, GERD, and anxiety admitted for COPD exacerbation. Pt found to have elevated troponin and t-wave inversions v4-v6. Pt upgraded to CCU on 8/31/19    NSTEMI  - has extensive cardiac history  -Opted for medical management as per cardiology  -c/w  c/w aspirin, plavix, metoprolol, and statin.  - c/w IV lasix as pt had bibasilar crackles transition to PO tomorrow  - repeat TTE shows Left ventricular ejection fraction, by visual estimation, is 45 to 50%.  Mildly decreased global left ventricular systolic function.      Hyperkalemia  - resolved    Acute on chronic hypercapnic respiratory failure due to acute exacerbation of chronic obstructive pulmonary disease  - no evidence of pneumonia  - c/w bipap prn  - HOB at 45 degrees, aspiration precautions  - c/w steroids  - Continue Duoneb q6h and PRN; continue with Symbicort q12h  - f/u out pt in 6 months for Pulm nodule (6mm at right lower lobe)  -Pulmonary to follow up     Macrocytosis anemia  -b12, folate levels pending    Hypothyroidism  - c/w synthroid  - TSH 0.74    Anxiety  - Continue xanax     Hypertension  - Continue diltiazem     Pt wants to be DNR/DNI but not willing to sign MOLST until she discusses with family    Pt has guarded prognosis and requires continued monitoring in ICU

## 2019-09-03 NOTE — PROGRESS NOTE ADULT - SUBJECTIVE AND OBJECTIVE BOX
Patient is a 78y old  Female who presents with a chief complaint of SOB (03 Sep 2019 06:51)      Over Night Events:  Patient seen and examined no resp distress no chest pain     ROS:  See HPI    PHYSICAL EXAM    ICU Vital Signs Last 24 Hrs  T(C): 36.1 (03 Sep 2019 08:00), Max: 36.8 (02 Sep 2019 20:00)  T(F): 97 (03 Sep 2019 08:00), Max: 98.3 (02 Sep 2019 20:00)  HR: 82 (03 Sep 2019 08:00) (64 - 108)  BP: 129/55 (03 Sep 2019 08:00) (119/69 - 174/73)  BP(mean): 75 (03 Sep 2019 08:00) (75 - 118)  ABP: --  ABP(mean): --  RR: 21 (03 Sep 2019 04:00) (18 - 32)  SpO2: 99% (03 Sep 2019 08:00) (96% - 100%)      General: awake   HEENT:    et tube             Lymph Nodes: NO cervical LN   Lungs: Bilateral BS  Cardiovascular: Regular   Abdomen: Soft, Positive BS  Extremities: No clubbing   Skin: warm   Neurological: no focal deficit   Musculoskeletal: move all ext     I&O's Detail    02 Sep 2019 07:01  -  03 Sep 2019 07:00  --------------------------------------------------------  IN:    Oral Fluid: 360 mL  Total IN: 360 mL    OUT:    Indwelling Catheter - Urethral: 2860 mL  Total OUT: 2860 mL    Total NET: -2500 mL          LABS:                          12.5   13.39 )-----------( 230      ( 03 Sep 2019 04:38 )             38.0         03 Sep 2019 04:38    138    |  88     |  25     ----------------------------<  105    3.4     |  38     |  0.7      Ca    9.3        03 Sep 2019 04:38  Phos  3.4       02 Sep 2019 04:35  Mg     2.5       03 Sep 2019 04:38    TPro  6.7    /  Alb  4.5    /  TBili  0.6    /  DBili  x      /  AST  42     /  ALT  115    /  AlkPhos  53     03 Sep 2019 04:38  Amylase x     lipase x                                                                                                                                                                                                                                         MEDICATIONS  (STANDING):  ALBUTerol/ipratropium for Nebulization 3 milliLiter(s) Nebulizer every 6 hours  ALPRAZolam 0.25 milliGRAM(s) Oral every 12 hours  aspirin enteric coated 81 milliGRAM(s) Oral daily  atorvastatin 80 milliGRAM(s) Oral at bedtime  buDESOnide 160 MICROgram(s)/formoterol 4.5 MICROgram(s) Inhaler 2 Puff(s) Inhalation two times a day  chlorhexidine 4% Liquid 1 Application(s) Topical <User Schedule>  clopidogrel Tablet 75 milliGRAM(s) Oral daily  diltiazem    milliGRAM(s) Oral daily  docusate sodium 100 milliGRAM(s) Oral three times a day  furosemide   Injectable 40 milliGRAM(s) IV Push two times a day  levothyroxine 75 MICROGram(s) Oral daily  metoprolol tartrate 12.5 milliGRAM(s) Oral two times a day  pantoprazole    Tablet 40 milliGRAM(s) Oral before breakfast  polyethylene glycol 3350 17 Gram(s) Oral two times a day  predniSONE   Tablet 40 milliGRAM(s) Oral daily  senna 2 Tablet(s) Oral at bedtime    MEDICATIONS  (PRN):  acetaminophen   Tablet .. 650 milliGRAM(s) Oral every 6 hours PRN Moderate Pain (4 - 6)  ondansetron Injectable 4 milliGRAM(s) IV Push every 6 hours PRN Nausea and/or Vomiting          Xrays:  TLC:  OG:  ET tube:                                                                                    decrease b/l opacity    ECHO:  [facility] ICU:

## 2019-09-03 NOTE — PROGRESS NOTE ADULT - SUBJECTIVE AND OBJECTIVE BOX
CC.  SOB  HPI.  Patient reports sob improving.  afebrile.  Offers no other complaints              Constitutional: No fever, fatigue or weight loss.  Skin: No rash.  Eyes: No recent vision problems or eye pain.  ENT: No congestion, ear pain, or sore throat.  Endocrine: No thyroid problems.  Cardiovascular: No chest pain or palpation.  Respiratory: No cough,   congestion, or wheezing.  Gastrointestinal: No abdominal pain, nausea, vomiting, or diarrhea.  Genitourinary: No dysuria.  Musculoskeletal: No joint swelling.  Neurologic: No headache.      Vital Signs Last 24 Hrs  T(C): 36.1 (09-03-19 @ 08:00), Max: 36.8 (09-02-19 @ 20:00)  T(F): 97 (09-03-19 @ 08:00), Max: 98.3 (09-02-19 @ 20:00)  HR: 85 (09-03-19 @ 10:00) (64 - 108)  BP: 143/68 (09-03-19 @ 10:00) (120/68 - 174/73)  BP(mean): 97 (09-03-19 @ 10:00) (75 - 118)  RR: 22 (09-03-19 @ 10:00) (18 - 24)  SpO2: 100% (09-03-19 @ 10:00) (96% - 100%)        PHYSICAL EXAM-  GENERAL: NAD, chronic ill appearing female  HEAD:  Atraumatic, Normocephalic  EYES: EOMI, PERRLA, conjunctiva and sclera clear  NECK: Supple, No JVD, Normal thyroid  NERVOUS SYSTEM:  Alert & Oriented X3, Moving all extremities  CHEST/LUNG: min crackle with good air entry.  no retractions or accessory muscle usage  HEART: Regular rate and rhythm; No murmurs, rubs, or gallops  ABDOMEN: Soft, Nontender, Nondistended; Bowel sounds present  EXTREMITIES:    No clubbing, cyanosis, or edema  SKIN: No rashes or lesions                                  12.5   13.39 )-----------( 230      ( 03 Sep 2019 04:38 )             38.0     09-03    138  |  88<L>  |  25<H>  ----------------------------<  105<H>  3.4<L>   |  38<H>  |  0.7    Ca    9.3      03 Sep 2019 04:38  Phos  3.4     09-02  Mg     2.5     09-03    TPro  6.7  /  Alb  4.5  /  TBili  0.6  /  DBili  x   /  AST  42<H>  /  ALT  115<H>  /  AlkPhos  53  09-03                    MEDICATIONS  (STANDING):  ALBUTerol/ipratropium for Nebulization 3 milliLiter(s) Nebulizer every 6 hours  ALPRAZolam 0.25 milliGRAM(s) Oral every 12 hours  aspirin enteric coated 81 milliGRAM(s) Oral daily  atorvastatin 80 milliGRAM(s) Oral at bedtime  buDESOnide 160 MICROgram(s)/formoterol 4.5 MICROgram(s) Inhaler 2 Puff(s) Inhalation two times a day  chlorhexidine 4% Liquid 1 Application(s) Topical <User Schedule>  clopidogrel Tablet 75 milliGRAM(s) Oral daily  diltiazem    milliGRAM(s) Oral daily  docusate sodium 100 milliGRAM(s) Oral three times a day  furosemide   Injectable 40 milliGRAM(s) IV Push two times a day  levothyroxine 75 MICROGram(s) Oral daily  metoprolol tartrate 12.5 milliGRAM(s) Oral two times a day  pantoprazole    Tablet 40 milliGRAM(s) Oral before breakfast  polyethylene glycol 3350 17 Gram(s) Oral two times a day  predniSONE   Tablet 40 milliGRAM(s) Oral daily  senna 2 Tablet(s) Oral at bedtime    MEDICATIONS  (PRN):  acetaminophen   Tablet .. 650 milliGRAM(s) Oral every 6 hours PRN Moderate Pain (4 - 6)  ondansetron Injectable 4 milliGRAM(s) IV Push every 6 hours PRN Nausea and/or Vomiting

## 2019-09-04 NOTE — PROGRESS NOTE ADULT - ASSESSMENT
76 y/o F with PMH of COPD on 2L O2 24/7, CAD, MI (1991) s/p CABG, hypothyroidism 2/2 radioablation for hyperthyroidism, AAA s/p repair, HTN, HLD, PVD s/p fem-pop bypass, GERD, and anxiety presented for shortness of breath.  Patient downgraded from CCU onto tele as no intervention was planned. Patient is clinically improved and waiting on the cardiologist plan before discharge.    # NSTEMI unknown type  - c/w  Lovenox qd, c/w aspirin, plavix, metoprolol.  - Started on PO lasix 40mg BID today (d/c IV)  - c/w lipitor  - repeat TTE shows Left ventricular ejection fraction, by visual estimation, is 45 to 50%.  Mildly decreased global left ventricular systolic function.  - on telemetry; called the cardiologist office for plan for catheterization, waiting on the response    #Hyperkalemia  - Resolved  - Received 40mEq KCl PO 09/03; f/u BMP    # Acute on chronic hypercapnic respiratory failure  - Bipap Q4 on and off PRN  - Continue Duonebs,   - prednisone 40 mg for 3 days (day 2/3 currently) and then 20 mg for 3 days after and then stop  - d/c abx as per ID  - f/u out pt in 6 months for Pulm nodule (6mm at right lower lobe)    # Constipation  - senna and colace  - KUB showed no obstruction  - water enema 9/2  - Patient had a bowel movement yesterday    # Hypothyroidism  - c/w synthroid  - TSH 0.74    # Hypertension  - Continue diltiazem     # Anxiety  - Continue xanax.    Pt wants to be DNR/DNI but not willing to sign MOLST until she discusses with family  Dispo: Pending Cardiology opinion 76 y/o F with PMH of COPD on 2L O2 24/7, CAD, MI (1991) s/p CABG, hypothyroidism 2/2 radioablation for hyperthyroidism, AAA s/p repair, HTN, HLD, PVD s/p fem-pop bypass, GERD, and anxiety presented for shortness of breath.  Patient downgraded from CCU onto tele as no intervention was planned. Patient is clinically improved and waiting on the cardiologist plan before discharge.    # NSTEMI unknown type  - Continue  Lovenox once daily,   - Continue aspirin, plavix, metoprolol.  - Continue PO lasix 40mg twice daily  - c/w lipitor  - repeat TTE shows Left ventricular ejection fraction, by visual estimation, is 45 to 50%.  Mildly decreased global left ventricular systolic function.  - on telemetry; called the cardiologist office for plan for catheterization, waiting on the response    # Hyperkalemia  - Resolved  - Received 40mEq KCl PO 09/03; f/u BMP    # Acute on chronic hypercapnic respiratory failure  - Bipap Q4 on and off PRN  - Continue Duonebs,   - prednisone 40 mg for 3 days (day 2/3 currently) and then 20 mg for 3 days after and then stop  - d/c abx as per ID  - f/u out pt in 6 months for Pulm nodule (6mm at right lower lobe)    # Constipation  - senna and colace  - KUB showed no obstruction  - water enema 9/2  - Patient had a bowel movement yesterday    # Hypothyroidism  - c/w synthroid  - TSH 0.74    # Hypertension  - Continue diltiazem     # Anxiety  - Continue xanax.    Pt wants to be DNR/DNI but not willing to sign MOLST until she discusses with family  Diet: DASH/TLC  DVT: Lovenox  Dispo: Pending Cardiology opinion

## 2019-09-04 NOTE — PROGRESS NOTE ADULT - ASSESSMENT
· Assessment		  78 yo F PMHx of COPD on 1.5L O2 PRN (usually uses at night), CAD, MI (1991) s/p CABG, hypothyroidism 2/2 radioablation for hyperthyroidism, AAA s/p repair, HTN, HLD, PVD s/p fem-pop bypass, GERD, and anxiety admitted for COPD exacerbation. Pt found to have elevated troponin and t-wave inversions v4-v6. Pt upgraded to CCU on 8/31/19 for NSTEMI    IMPRESSION:  No sepsis  No PNA  WBC 13.3 ( on steroids )  BCX 8/29 NG  CXR/ CT not compatible with PNA    RECOMMENDATIONS:  No ABx  recall prn please

## 2019-09-04 NOTE — PROGRESS NOTE ADULT - ASSESSMENT
78 y/o F with PMH of COPD, CAD, MI (1991) s/p CABG, hypothyroidism, AAA s/p repair, HTN, HLD, PVD s/p fem-pop bypass, GERD, and anxiety presented for shortness of breath. Patient was found with acute respiratory failure elevated troponin, she was admitted to CCU, started on medical management Troponin was stable,       A/P:   Acute on chronic respiratory failure:   Acute COPD exacerbation:   still with SOB, poor air entry on exam, advanced COPD.   Continue Prednisone 40mg daily for another day then 20mg for 3 days.   Continue DuoNeb   Pulmonary follow up.     NSTEMI: likely type 1  CAD s/p CABG  patient presented with chest pain, mild troponin elevated, stable for now  continue with medical management, cardiology follow up for any further workup if needed.   continue with ASA, Plavix, Lipitor and Metoprolol.     Hypothyroidism:   Continue synthroid.       #Progress Note Handoff:  Pending (specify):  improving COPD.  Family discussion: with patient,   Disposition: Home vs SNF.

## 2019-09-04 NOTE — PROGRESS NOTE ADULT - SUBJECTIVE AND OBJECTIVE BOX
CARLOS IVERSON  78y, Female      OVERNIGHT EVENTS:    no fevers, no cough  no abdominal pain/ diarrhea    VITALS:  T(F): 96.9, Max: 98 (09-03-19 @ 16:00)  HR: 78  BP: 123/58  RR: 17Vital Signs Last 24 Hrs  T(C): 36.1 (04 Sep 2019 05:19), Max: 36.7 (03 Sep 2019 16:00)  T(F): 96.9 (04 Sep 2019 05:19), Max: 98 (03 Sep 2019 16:00)  HR: 78 (04 Sep 2019 05:19) (78 - 106)  BP: 123/58 (04 Sep 2019 05:19) (122/79 - 178/67)  BP(mean): 109 (03 Sep 2019 16:00) (75 - 109)  RR: 17 (04 Sep 2019 05:19) (16 - 22)  SpO2: 99% (03 Sep 2019 20:54) (99% - 100%)    TESTS & MEASUREMENTS:                        12.5   13.39 )-----------( 230      ( 03 Sep 2019 04:38 )             38.0     09-03    138  |  90<L>  |  29<H>  ----------------------------<  117<H>  4.0   |  36<H>  |  0.7    Ca    9.9      03 Sep 2019 23:59  Mg     2.5     09-03    TPro  6.7  /  Alb  4.5  /  TBili  0.6  /  DBili  x   /  AST  42<H>  /  ALT  115<H>  /  AlkPhos  53  09-03    LIVER FUNCTIONS - ( 03 Sep 2019 04:38 )  Alb: 4.5 g/dL / Pro: 6.7 g/dL / ALK PHOS: 53 U/L / ALT: 115 U/L / AST: 42 U/L / GGT: x             Culture - Blood (collected 08-29-19 @ 19:00)  Source: .Blood Blood  Final Report (09-04-19 @ 04:01):    No growth at 5 days.    Culture - Blood (collected 08-29-19 @ 19:00)  Source: .Blood Blood  Final Report (09-04-19 @ 04:00):    No growth at 5 days.            RADIOLOGY & ADDITIONAL TESTS:    ANTIBIOTICS:

## 2019-09-04 NOTE — PROGRESS NOTE ADULT - SUBJECTIVE AND OBJECTIVE BOX
SUBJECTIVE:    Patient is a 78y old Female who presents with a chief complaint of SOB (04 Sep 2019 06:48)    Currently admitted to medicine with the primary diagnosis of COPD (chronic obstructive pulmonary disease)     Today is hospital day 6d. This morning she is resting comfortably in bed and reports no new issues or overnight events.     INTERVAL EVENTS: Patient states that she is way better than when she came in and has only residual abdominal pain. Patient is still on nasal canula and is saturating above 95%.    PAST MEDICAL & SURGICAL HISTORY  History of myocardial infarction  Anxiety  Gastroesophageal reflux disease without esophagitis  Essential hypertension  Abdominal aortic aneurysm (AAA) without rupture  Chronic obstructive pulmonary disease with acute exacerbation  Peripheral vascular disease  Coronary artery disease involving native coronary artery of native heart without angina pectoris  Postablative hypothyroidism  Graves disease  MI (myocardial infarction)  S/P CABG (coronary artery bypass graft)  History of femoropopliteal bypass  After cataract, bilateral: R - 10/13  L - 8/14  H/O angioplasty: R femoral  H/O: hysterectomy: partial  History of cholecystectomy      ALLERGIES:  No Known Allergies    MEDICATIONS:  STANDING MEDICATIONS  ALBUTerol/ipratropium for Nebulization 3 milliLiter(s) Nebulizer every 6 hours  ALPRAZolam 0.25 milliGRAM(s) Oral every 12 hours  aspirin enteric coated 81 milliGRAM(s) Oral daily  atorvastatin 80 milliGRAM(s) Oral at bedtime  buDESOnide 160 MICROgram(s)/formoterol 4.5 MICROgram(s) Inhaler 2 Puff(s) Inhalation two times a day  chlorhexidine 4% Liquid 1 Application(s) Topical <User Schedule>  clopidogrel Tablet 75 milliGRAM(s) Oral daily  diltiazem    milliGRAM(s) Oral daily  docusate sodium 100 milliGRAM(s) Oral three times a day  enoxaparin Injectable 40 milliGRAM(s) SubCutaneous daily  furosemide    Tablet 40 milliGRAM(s) Oral two times a day  levothyroxine 75 MICROGram(s) Oral daily  metoprolol tartrate 12.5 milliGRAM(s) Oral two times a day  pantoprazole    Tablet 40 milliGRAM(s) Oral before breakfast  polyethylene glycol 3350 17 Gram(s) Oral two times a day  predniSONE   Tablet 40 milliGRAM(s) Oral daily  senna 2 Tablet(s) Oral at bedtime    PRN MEDICATIONS  acetaminophen   Tablet .. 650 milliGRAM(s) Oral every 6 hours PRN  ondansetron Injectable 4 milliGRAM(s) IV Push every 6 hours PRN    VITALS:   T(F): 97.7  HR: 81  BP: 135/62  RR: 18  SpO2: 99%    LABS:                        12.4   14.89 )-----------( 225      ( 04 Sep 2019 06:06 )             39.1     09-04    142  |  90<L>  |  25<H>  ----------------------------<  103<H>  4.4   |  39<H>  |  0.7    Ca    9.8      04 Sep 2019 06:06  Phos  4.0     09-04  Mg     2.7     09-04    TPro  6.8  /  Alb  4.8  /  TBili  0.8  /  DBili  x   /  AST  31  /  ALT  85<H>  /  AlkPhos  55  09-04                  RADIOLOGY:    PHYSICAL EXAM:  GEN: No acute distress  PULM/CHEST: Clear to auscultation bilaterally, no rales, rhonchi or wheezes   CVS: Regular rate and rhythm, S1-S2, no murmurs  ABD: Soft, non-tender, non-distended, +BS  EXT: No edema  NEURO: AAOx3    Ljoa Catheter: SUBJECTIVE:    Patient is a 78y old Female who presents with a chief complaint of SOB (04 Sep 2019 06:48)    Currently admitted to medicine with the primary diagnosis of COPD (chronic obstructive pulmonary disease)     Today is hospital day 6d. This morning she is resting comfortably in bed and reports no new issues or overnight events.     INTERVAL EVENTS: Patient states that she is way better than when she came in and has only residual abdominal pain. Patient is still on nasal canula and is saturating above 95%.    PAST MEDICAL & SURGICAL HISTORY  History of myocardial infarction  Anxiety  Gastroesophageal reflux disease without esophagitis  Essential hypertension  Abdominal aortic aneurysm (AAA) without rupture  Chronic obstructive pulmonary disease with acute exacerbation  Peripheral vascular disease  Coronary artery disease involving native coronary artery of native heart without angina pectoris  Postablative hypothyroidism  Graves disease  MI (myocardial infarction)  S/P CABG (coronary artery bypass graft)  History of femoropopliteal bypass  After cataract, bilateral: R - 10/13  L - 8/14  H/O angioplasty: R femoral  H/O: hysterectomy: partial  History of cholecystectomy      ALLERGIES:  No Known Allergies    MEDICATIONS:  STANDING MEDICATIONS  ALBUTerol/ipratropium for Nebulization 3 milliLiter(s) Nebulizer every 6 hours  ALPRAZolam 0.25 milliGRAM(s) Oral every 12 hours  aspirin enteric coated 81 milliGRAM(s) Oral daily  atorvastatin 80 milliGRAM(s) Oral at bedtime  buDESOnide 160 MICROgram(s)/formoterol 4.5 MICROgram(s) Inhaler 2 Puff(s) Inhalation two times a day  chlorhexidine 4% Liquid 1 Application(s) Topical <User Schedule>  clopidogrel Tablet 75 milliGRAM(s) Oral daily  diltiazem    milliGRAM(s) Oral daily  docusate sodium 100 milliGRAM(s) Oral three times a day  enoxaparin Injectable 40 milliGRAM(s) SubCutaneous daily  furosemide    Tablet 40 milliGRAM(s) Oral two times a day  levothyroxine 75 MICROGram(s) Oral daily  metoprolol tartrate 12.5 milliGRAM(s) Oral two times a day  pantoprazole    Tablet 40 milliGRAM(s) Oral before breakfast  polyethylene glycol 3350 17 Gram(s) Oral two times a day  predniSONE   Tablet 40 milliGRAM(s) Oral daily  senna 2 Tablet(s) Oral at bedtime    PRN MEDICATIONS  acetaminophen   Tablet .. 650 milliGRAM(s) Oral every 6 hours PRN  ondansetron Injectable 4 milliGRAM(s) IV Push every 6 hours PRN    VITALS:   T(F): 97.7  HR: 81  BP: 135/62  RR: 18  SpO2: 99%    LABS:                        12.4   14.89 )-----------( 225      ( 04 Sep 2019 06:06 )             39.1     09-04    142  |  90<L>  |  25<H>  ----------------------------<  103<H>  4.4   |  39<H>  |  0.7    Ca    9.8      04 Sep 2019 06:06  Phos  4.0     09-04  Mg     2.7     09-04    TPro  6.8  /  Alb  4.8  /  TBili  0.8  /  DBili  x   /  AST  31  /  ALT  85<H>  /  AlkPhos  55  09-04                  RADIOLOGY:    PHYSICAL EXAM:  GEN: No acute distress, patient on nasal canula, comfortably eating her breakfast  PULM/CHEST: Mild ronchi heard  CVS: Regular rate and rhythm, S1-S2, no murmurs  ABD: Soft, non-tender, non-distended, +BS  EXT: No edema  NEURO: AAOx3    Loja Catheter: SUBJECTIVE:  HPI:  76 y/o F with PMH of COPD on 2L O2 24/7, CAD, MI (1991) s/p CABG, hypothyroidism 2/2 radioablation for hyperthyroidism, AAA s/p repair, HTN, HLD, PVD s/p fem-pop bypass, GERD, and anxiety presented for shortness of breath. She says that the sob started 3 weeks ago and it has been progressing since. She was able to ambulate and did not increase her O2 but was using her inhalers more frequently up to 6 times a day. She visited her PMD for a routine blood work and everything was within normal limits except a high blood pressure of 200/90 (amlodipine 5mg was started). Today after doing groceries, patient felt a substernal chest pain, 10/10, radiating to right shoulder and left arm with a squeezing pain in her upper abdomen especially when she breathes, associated with nausea. This was associated with severe shortness of breath that limited the ambulation and prompted the visit to the ED. She has been having a dry cough and feels phlegm stuck in her throat and she cannot expectorate it.     In ED, Code STEMI was called which was later cancelled by cardiology. CXR showed hyperinflation of both lungs with a questionable right lower lobe opacity. Her SaO2 was 95% on 2L O2, VBG was unremarkable (29 Aug 2019 20:18)    Patient is a 78y old Female who presents with a chief complaint of SOB (04 Sep 2019 06:48)    Currently admitted to medicine with the primary diagnosis of COPD (chronic obstructive pulmonary disease)     Today is hospital day 6d. This morning she is resting comfortably in bed and reports no new issues or overnight events.     INTERVAL EVENTS: Patient states that she is way better than when she came in and has only residual abdominal pain. Patient is still on nasal canula and is saturating above 95%.    PAST MEDICAL & SURGICAL HISTORY  History of myocardial infarction  Anxiety  Gastroesophageal reflux disease without esophagitis  Essential hypertension  Abdominal aortic aneurysm (AAA) without rupture  Chronic obstructive pulmonary disease with acute exacerbation  Peripheral vascular disease  Coronary artery disease involving native coronary artery of native heart without angina pectoris  Postablative hypothyroidism  Graves disease  MI (myocardial infarction)  S/P CABG (coronary artery bypass graft)  History of femoropopliteal bypass  After cataract, bilateral: R - 10/13  L - 8/14  H/O angioplasty: R femoral  H/O: hysterectomy: partial  History of cholecystectomy      ALLERGIES:  No Known Allergies    MEDICATIONS:  STANDING MEDICATIONS  ALBUTerol/ipratropium for Nebulization 3 milliLiter(s) Nebulizer every 6 hours  ALPRAZolam 0.25 milliGRAM(s) Oral every 12 hours  aspirin enteric coated 81 milliGRAM(s) Oral daily  atorvastatin 80 milliGRAM(s) Oral at bedtime  buDESOnide 160 MICROgram(s)/formoterol 4.5 MICROgram(s) Inhaler 2 Puff(s) Inhalation two times a day  chlorhexidine 4% Liquid 1 Application(s) Topical <User Schedule>  clopidogrel Tablet 75 milliGRAM(s) Oral daily  diltiazem    milliGRAM(s) Oral daily  docusate sodium 100 milliGRAM(s) Oral three times a day  enoxaparin Injectable 40 milliGRAM(s) SubCutaneous daily  furosemide    Tablet 40 milliGRAM(s) Oral two times a day  levothyroxine 75 MICROGram(s) Oral daily  metoprolol tartrate 12.5 milliGRAM(s) Oral two times a day  pantoprazole    Tablet 40 milliGRAM(s) Oral before breakfast  polyethylene glycol 3350 17 Gram(s) Oral two times a day  predniSONE   Tablet 40 milliGRAM(s) Oral daily  senna 2 Tablet(s) Oral at bedtime    PRN MEDICATIONS  acetaminophen   Tablet .. 650 milliGRAM(s) Oral every 6 hours PRN  ondansetron Injectable 4 milliGRAM(s) IV Push every 6 hours PRN    VITALS:   T(F): 97.7  HR: 81  BP: 135/62  RR: 18  SpO2: 99%    LABS:                        12.4   14.89 )-----------( 225      ( 04 Sep 2019 06:06 )             39.1     09-04    142  |  90<L>  |  25<H>  ----------------------------<  103<H>  4.4   |  39<H>  |  0.7    Ca    9.8      04 Sep 2019 06:06  Phos  4.0     09-04  Mg     2.7     09-04    TPro  6.8  /  Alb  4.8  /  TBili  0.8  /  DBili  x   /  AST  31  /  ALT  85<H>  /  AlkPhos  55  09-04                  RADIOLOGY:    PHYSICAL EXAM:  GEN: No acute distress, patient on nasal canula, comfortably eating her breakfast  PULM/CHEST: Mild ronchi heard  CVS: Regular rate and rhythm, S1-S2, no murmurs  ABD: Soft, non-tender, non-distended, +BS  EXT: No edema  NEURO: AAOx3    Loja Catheter:

## 2019-09-04 NOTE — PROGRESS NOTE ADULT - SUBJECTIVE AND OBJECTIVE BOX
KISHORCARLOS  78y  Female      Patient is a 78y old  Female who presents with a chief complaint of SOB (04 Sep 2019 14:02)      INTERVAL HPI/OVERNIGHT EVENTS:    Vital Signs Last 24 Hrs  T(C): 36.5 (04 Sep 2019 13:03), Max: 36.6 (03 Sep 2019 20:42)  T(F): 97.7 (04 Sep 2019 13:03), Max: 97.9 (03 Sep 2019 20:42)  HR: 81 (04 Sep 2019 13:03) (78 - 87)  BP: 135/62 (04 Sep 2019 13:03) (122/79 - 135/62)  BP(mean): --  RR: 18 (04 Sep 2019 13:03) (16 - 18)  SpO2: 99% (03 Sep 2019 20:54) (99% - 99%)      09-03-19 @ 07:01  -  09-04-19 @ 07:00  --------------------------------------------------------  IN: 1100 mL / OUT: 300 mL / NET: 800 mL    09-04-19 @ 07:01  -  09-04-19 @ 16:29  --------------------------------------------------------  IN: 1000 mL / OUT: 0 mL / NET: 1000 mL            Consultant(s) Notes Reviewed:  [x ] YES  [ ] NO          MEDICATIONS  (STANDING):  ALBUTerol/ipratropium for Nebulization 3 milliLiter(s) Nebulizer every 6 hours  ALPRAZolam 0.25 milliGRAM(s) Oral every 12 hours  aspirin enteric coated 81 milliGRAM(s) Oral daily  atorvastatin 80 milliGRAM(s) Oral at bedtime  buDESOnide 160 MICROgram(s)/formoterol 4.5 MICROgram(s) Inhaler 2 Puff(s) Inhalation two times a day  chlorhexidine 4% Liquid 1 Application(s) Topical <User Schedule>  clopidogrel Tablet 75 milliGRAM(s) Oral daily  diltiazem    milliGRAM(s) Oral daily  docusate sodium 100 milliGRAM(s) Oral three times a day  enoxaparin Injectable 40 milliGRAM(s) SubCutaneous daily  furosemide    Tablet 40 milliGRAM(s) Oral two times a day  levothyroxine 75 MICROGram(s) Oral daily  metoprolol tartrate 12.5 milliGRAM(s) Oral two times a day  pantoprazole    Tablet 40 milliGRAM(s) Oral before breakfast  polyethylene glycol 3350 17 Gram(s) Oral two times a day  predniSONE   Tablet 40 milliGRAM(s) Oral daily  senna 2 Tablet(s) Oral at bedtime    MEDICATIONS  (PRN):  acetaminophen   Tablet .. 650 milliGRAM(s) Oral every 6 hours PRN Moderate Pain (4 - 6)  ondansetron Injectable 4 milliGRAM(s) IV Push every 6 hours PRN Nausea and/or Vomiting      LABS                          12.4   14.89 )-----------( 225      ( 04 Sep 2019 06:06 )             39.1     09-04    142  |  90<L>  |  25<H>  ----------------------------<  103<H>  4.4   |  39<H>  |  0.7    Ca    9.8      04 Sep 2019 06:06  Phos  4.0     09-04  Mg     2.7     09-04    TPro  6.8  /  Alb  4.8  /  TBili  0.8  /  DBili  x   /  AST  31  /  ALT  85<H>  /  AlkPhos  55  09-04          Lactate Trend        CAPILLARY BLOOD GLUCOSE          Culture - Blood (collected 08-29-19 @ 19:00)  Source: .Blood Blood  Final Report (09-04-19 @ 04:01):    No growth at 5 days.    Culture - Blood (collected 08-29-19 @ 19:00)  Source: .Blood Blood  Final Report (09-04-19 @ 04:00):    No growth at 5 days.        RADIOLOGY & ADDITIONAL TESTS:    Imaging Personally Reviewed:  [ ] YES  [ ] NO    HEALTH ISSUES - PROBLEM Dx:  Suspected pulmonary embolism        PHYSICAL EXAM:  GENERAL: NAD, well-developed  HEAD:  Atraumatic, Normocephalic  EYES: EOMI, PERRLA, conjunctiva and sclera clear  NECK: Supple, No JVD  CHEST/LUNG: poor air entry bilateral.   HEART: Regular rate and rhythm; S1 S2  ABDOMEN: Soft, Nontender, Nondistended; Bowel sounds present  EXTREMITIES:  2+ Peripheral Pulses, No clubbing, cyanosis, or edema  PSYCH: AAOx3  NEUROLOGY: non-focal  SKIN: No rashes or lesions

## 2019-09-05 NOTE — PROGRESS NOTE ADULT - ASSESSMENT
76 y/o F with PMH of COPD on 2L O2 24/7, CAD, MI (1991) s/p CABG, hypothyroidism 2/2 radioablation for hyperthyroidism, AAA s/p repair, HTN, HLD, PVD s/p fem-pop bypass, GERD, and anxiety presented for shortness of breath.  Patient downgraded from CCU onto tele as no intervention was planned. Patient is clinically improved and waiting on the improvement in her breathing status    # NSTEMI unknown type  - Continue  Lovenox once daily,   - Continue aspirin, plavix, metoprolol.  - Continue PO lasix 40mg twice daily  - c/w lipitor  - repeat TTE shows Left ventricular ejection fraction, by visual estimation, is 45 to 50%.  Mildly decreased global left ventricular systolic function.  - on telemetry; called the cardiologist office for plan for catheterization, medical management only  - Episode of SVT on tele, patient on beta blockers    # Hyperkalemia  - Resolved  - Received 40mEq KCl PO 09/03; f/u BMP    # Acute on chronic hypercapnic respiratory failure  - Bipap Q4 on and off PRN  - Continue Duonebs,   - prednisone 40 mg for 3 days (day 3/3 currently) and then 20 mg for 3 days after and then stop  - d/c abx as per ID  - Patient still wheezing mildly 09/05  - f/u out pt in 6 months for Pulm nodule (6mm at right lower lobe)    # Constipation  - senna and colace  - KUB showed no obstruction  - water enema 9/2  - Resolved    # Hypothyroidism  - c/w synthroid  - TSH 0.74    # Hypertension  - Continue diltiazem     # Anxiety  - Continue xanax.    Pt wants to be DNR/DNI but not willing to sign MOLST until she discusses with family  Diet: DASH/TLC  DVT: Lovenox  Dispo: Pending Breathing improvement

## 2019-09-05 NOTE — DIETITIAN INITIAL EVALUATION ADULT. - DIET TYPE
Per pt. relatively good appetite PTP, not a big breakfast eater. Pt. likes to have lunch out- usually fast food, then homemade dinner with family. Vit D supplement. NKFA. Stable weight trends. Pt. reports always being thin. Chronic constipation- on bowel regimen at home. IBS- stable.

## 2019-09-05 NOTE — DIETITIAN INITIAL EVALUATION ADULT. - PERTINENT MEDS FT
Lasix, Prednisone, Miralax, Senna, Atorvastatin, Metoprolol, Zofran, Colace, Levothyroxine, Protonix

## 2019-09-05 NOTE — DIETITIAN INITIAL EVALUATION ADULT. - OTHER INFO
P/w: SOB.  NSTEMI unknown type- on telemetry, cardio following. Acute on chronic hypercapnic respiratory failure- Bipap Q4 on and off PRN. Constipation- KUB showed no obstruction, bowel regimen.

## 2019-09-05 NOTE — PROGRESS NOTE ADULT - SUBJECTIVE AND OBJECTIVE BOX
KISHOR, CARLOS  78y  Female      Patient is a 78y old  Female who presents with a chief complaint of SOB (05 Sep 2019 13:23)      INTERVAL HPI/OVERNIGHT EVENTS:  She   Vital Signs Last 24 Hrs  T(C): 36.6 (05 Sep 2019 13:48), Max: 36.6 (05 Sep 2019 13:48)  T(F): 97.8 (05 Sep 2019 13:48), Max: 97.8 (05 Sep 2019 13:48)  HR: 81 (05 Sep 2019 13:48) (57 - 82)  BP: 124/58 (05 Sep 2019 13:48) (116/84 - 148/64)  BP(mean): --  RR: 18 (05 Sep 2019 13:48) (17 - 18)  SpO2: 96% (05 Sep 2019 12:24) (96% - 96%)      09-04-19 @ 07:01  -  09-05-19 @ 07:00  --------------------------------------------------------  IN: 1000 mL / OUT: 0 mL / NET: 1000 mL    09-05-19 @ 07:01  -  09-05-19 @ 16:08  --------------------------------------------------------  IN: 1000 mL / OUT: 0 mL / NET: 1000 mL            Consultant(s) Notes Reviewed:  [x ] YES  [ ] NO          MEDICATIONS  (STANDING):  ALBUTerol/ipratropium for Nebulization 3 milliLiter(s) Nebulizer every 6 hours  aspirin enteric coated 81 milliGRAM(s) Oral daily  atorvastatin 80 milliGRAM(s) Oral at bedtime  buDESOnide 160 MICROgram(s)/formoterol 4.5 MICROgram(s) Inhaler 2 Puff(s) Inhalation two times a day  chlorhexidine 4% Liquid 1 Application(s) Topical <User Schedule>  clopidogrel Tablet 75 milliGRAM(s) Oral daily  diltiazem    milliGRAM(s) Oral daily  docusate sodium 100 milliGRAM(s) Oral three times a day  enoxaparin Injectable 40 milliGRAM(s) SubCutaneous daily  furosemide    Tablet 40 milliGRAM(s) Oral two times a day  levothyroxine 75 MICROGram(s) Oral daily  melatonin 3 milliGRAM(s) Oral at bedtime  metoprolol tartrate 12.5 milliGRAM(s) Oral two times a day  pantoprazole    Tablet 40 milliGRAM(s) Oral before breakfast  polyethylene glycol 3350 17 Gram(s) Oral two times a day  predniSONE   Tablet 40 milliGRAM(s) Oral daily  senna 2 Tablet(s) Oral at bedtime    MEDICATIONS  (PRN):  acetaminophen   Tablet .. 650 milliGRAM(s) Oral every 6 hours PRN Moderate Pain (4 - 6)  ALPRAZolam 0.25 milliGRAM(s) Oral every 12 hours PRN anxiety  ondansetron Injectable 4 milliGRAM(s) IV Push every 6 hours PRN Nausea and/or Vomiting      LABS                          13.6   15.89 )-----------( 244      ( 05 Sep 2019 06:07 )             42.6     09-05    138  |  90<L>  |  27<H>  ----------------------------<  102<H>  4.3   |  38<H>  |  0.8    Ca    10.0      05 Sep 2019 06:07  Phos  4.0     09-04  Mg     2.5     09-05    TPro  7.5  /  Alb  5.2  /  TBili  0.8  /  DBili  x   /  AST  43<H>  /  ALT  78<H>  /  AlkPhos  58  09-05          Lactate Trend        CAPILLARY BLOOD GLUCOSE          Culture - Blood (collected 08-29-19 @ 19:00)  Source: .Blood Blood  Final Report (09-04-19 @ 04:01):    No growth at 5 days.    Culture - Blood (collected 08-29-19 @ 19:00)  Source: .Blood Blood  Final Report (09-04-19 @ 04:00):    No growth at 5 days.        RADIOLOGY & ADDITIONAL TESTS:    Imaging Personally Reviewed:  [ ] YES  [ ] NO    HEALTH ISSUES - PROBLEM Dx:  Suspected pulmonary embolism        PHYSICAL EXAM:  GENERAL: NAD, well-developed  HEAD:  Atraumatic, Normocephalic  EYES: EOMI, PERRLA, conjunctiva and sclera clear  NECK: Supple, No JVD  CHEST/LUNG: poor air entry bilateral.   HEART: Regular rate and rhythm; S1 S2  ABDOMEN: Soft, Nontender, Nondistended; Bowel sounds present  EXTREMITIES:  2+ Peripheral Pulses, No clubbing, cyanosis, or edema  PSYCH: AAOx3  NEUROLOGY: non-focal  SKIN: No rashes or lesions

## 2019-09-05 NOTE — DIETITIAN INITIAL EVALUATION ADULT. - FACTORS AFF FOOD INTAKE
reports nausea and abd discomfort at times, however not affecting her appetite at this time, denies chew/swallow difficulty, last BM 9/2- enema.

## 2019-09-05 NOTE — DIETITIAN INITIAL EVALUATION ADULT. - PHYSICAL APPEARANCE
BMI 19.2, alert&oriented, skin: intact (BS 18), no obvious signs of malnutrition noted on limited physical exam today

## 2019-09-05 NOTE — PROGRESS NOTE ADULT - ASSESSMENT
76 y/o F with PMH of COPD, CAD, MI (1991) s/p CABG, hypothyroidism, AAA s/p repair, HTN, HLD, PVD s/p fem-pop bypass, GERD, and anxiety presented for shortness of breath. Patient was found with acute respiratory failure elevated troponin, she was admitted to CCU, started on medical management Troponin was stable,       A/P:   Acute on chronic respiratory failure:   Acute COPD exacerbation:   still with SOB, poor air entry on exam, advanced COPD.   Continue Prednisone 40mg today day then 20mg for 3 days.   Continue DuoNeb   Pulmonary follow up.     NSTEMI: likely type 1  CAD s/p CABG  patient presented with chest pain, mild troponin elevated, stable for now  continue with medical management, cardiology follow up for any further workup if needed.   continue with ASA, Plavix, Lipitor and Metoprolol.     Hypothyroidism:   Continue synthroid.       #Progress Note Handoff:  Pending (specify):  improving COPD.  Family discussion: with patient,   Disposition: Home vs SNF.

## 2019-09-05 NOTE — PROGRESS NOTE ADULT - SUBJECTIVE AND OBJECTIVE BOX
SUBJECTIVE:    Patient is a 78y old Female who presents with a chief complaint of SOB (04 Sep 2019 16:29)    Currently admitted to medicine with the primary diagnosis of COPD (chronic obstructive pulmonary disease)     Today is hospital day 7d. This morning she is resting comfortably in bed and reports no new issues or overnight events.     INTERVAL EVENTS: Patient still has mild respiratory discomfort but no other clinical complaints.    PAST MEDICAL & SURGICAL HISTORY  History of myocardial infarction  Anxiety  Gastroesophageal reflux disease without esophagitis  Essential hypertension  Abdominal aortic aneurysm (AAA) without rupture  Chronic obstructive pulmonary disease with acute exacerbation  Peripheral vascular disease  Coronary artery disease involving native coronary artery of native heart without angina pectoris  Postablative hypothyroidism  Graves disease  MI (myocardial infarction)  S/P CABG (coronary artery bypass graft)  History of femoropopliteal bypass  After cataract, bilateral: R - 10/13  L - 8/14  H/O angioplasty: R femoral  H/O: hysterectomy: partial  History of cholecystectomy      ALLERGIES:  No Known Allergies    MEDICATIONS:  STANDING MEDICATIONS  ALBUTerol/ipratropium for Nebulization 3 milliLiter(s) Nebulizer every 6 hours  aspirin enteric coated 81 milliGRAM(s) Oral daily  atorvastatin 80 milliGRAM(s) Oral at bedtime  buDESOnide 160 MICROgram(s)/formoterol 4.5 MICROgram(s) Inhaler 2 Puff(s) Inhalation two times a day  chlorhexidine 4% Liquid 1 Application(s) Topical <User Schedule>  clopidogrel Tablet 75 milliGRAM(s) Oral daily  diltiazem    milliGRAM(s) Oral daily  docusate sodium 100 milliGRAM(s) Oral three times a day  enoxaparin Injectable 40 milliGRAM(s) SubCutaneous daily  furosemide    Tablet 40 milliGRAM(s) Oral two times a day  levothyroxine 75 MICROGram(s) Oral daily  melatonin 3 milliGRAM(s) Oral at bedtime  metoprolol tartrate 12.5 milliGRAM(s) Oral two times a day  pantoprazole    Tablet 40 milliGRAM(s) Oral before breakfast  polyethylene glycol 3350 17 Gram(s) Oral two times a day  predniSONE   Tablet 40 milliGRAM(s) Oral daily  senna 2 Tablet(s) Oral at bedtime    PRN MEDICATIONS  acetaminophen   Tablet .. 650 milliGRAM(s) Oral every 6 hours PRN  ALPRAZolam 0.25 milliGRAM(s) Oral every 12 hours PRN  ondansetron Injectable 4 milliGRAM(s) IV Push every 6 hours PRN    VITALS:   T(F): 96.5  HR: 82  BP: 116/84  RR: 17  SpO2: 96%    LABS:                        13.6   15.89 )-----------( 244      ( 05 Sep 2019 06:07 )             42.6     09-05    138  |  90<L>  |  27<H>  ----------------------------<  102<H>  4.3   |  38<H>  |  0.8    Ca    10.0      05 Sep 2019 06:07  Phos  4.0     09-04  Mg     2.5     09-05    TPro  7.5  /  Alb  5.2  /  TBili  0.8  /  DBili  x   /  AST  43<H>  /  ALT  78<H>  /  AlkPhos  58  09-05                  RADIOLOGY:    PHYSICAL EXAM:  GEN: No acute distress  PULM/CHEST: Mild wheezing bilaterally  CVS: Regular rate and rhythm, S1-S2, no murmurs  ABD: Soft, non-tender, non-distended, +BS  EXT: No edema  NEURO: AAOx3    Loja Catheter:

## 2019-09-05 NOTE — DIETITIAN INITIAL EVALUATION ADULT. - ENERGY NEEDS
calorie 1018-1102kcal (MSJ x 1.2-1.3 AF)   protein 45-54g (1-1.2g/kg CBW)  fluid 1ml/kcal or per LIP

## 2019-09-05 NOTE — PHYSICAL THERAPY INITIAL EVALUATION ADULT - GENERAL OBSERVATIONS, REHAB EVAL
PT educated pt on role of b/s PT and content of IE eval when appropriate. Pt verbalized understanding.
pt encountered in bedside chair in NAD, agreeable to PT IE, + Telemetry, + 3L O/2 via NC. Pt saturation >94% throughout ambulation

## 2019-09-06 NOTE — DISCHARGE NOTE PROVIDER - HOSPITAL COURSE
HPI:    76 y/o F with PMH of COPD on 2L O2 24/7, CAD, MI (1991) s/p CABG, hypothyroidism 2/2 radioablation for hyperthyroidism, AAA s/p repair, HTN, HLD, PVD s/p fem-pop bypass, GERD, and anxiety presented for shortness of breath. She says that the sob started 3 weeks ago and it has been progressing since. She was able to ambulate and did not increase her O2 but was using her inhalers more frequently up to 6 times a day. She visited her PMD for a routine blood work and everything was within normal limits except a high blood pressure of 200/90 (amlodipine 5mg was started). Today after doing groceries, patient felt a substernal chest pain, 10/10, radiating to right shoulder and left arm with a squeezing pain in her upper abdomen especially when she breathes, associated with nausea. This was associated with severe shortness of breath that limited the ambulation and prompted the visit to the ED. She has been having a dry cough and feels phlegm stuck in her throat and she cannot expectorate it.     In ED, Code STEMI was called which was later cancelled by cardiology. CXR showed hyperinflation of both lungs with a questionable right lower lobe opacity. Her SaO2 was 95% on 2L O2, VBG was unremarkable (29 Aug 2019 20:18)        Patient was initially monitored in CCU for a possible intervention. Patient was diagnosed with NSTEMI possibly type 1 and was downgraded from CCU onto tele as no intervention was planned. TTE was done and showed Left ventricular ejection fraction, by visual estimation, is 45 to 50%.  Mildly decreased global left ventricular systolic function. Patient was monitored on telemetry and continued on Lovenox once daily,  aspirin, plavix, metoprolol and lasix. Patient also had the complaints of Acute on chronic hypercapnic respiratory failure was put on Bipap Q4 on and off PRN and treated with Duonebs and Prednisone. A nodule was found on chest imaging and will followup with Pulm in 6 months. Patient is clinically improved and waiting on the improvement and her pain is better and breathing is much improved and will be discharged today.

## 2019-09-06 NOTE — DISCHARGE NOTE PROVIDER - PROVIDER TOKENS
PROVIDER:[TOKEN:[[id number]],FOLLOWUP:[2 weeks]],PROVIDER:[TOKEN:[[id number]],FOLLOWUP:[2 weeks]],PROVIDER:[TOKEN:[[id number]],FOLLOWUP:[2 weeks]]

## 2019-09-06 NOTE — DISCHARGE NOTE PROVIDER - CARE PROVIDERS DIRECT ADDRESSES
,DirectAddress_Unknown,DirectAddress_Unknown,[email or web address]

## 2019-09-06 NOTE — DISCHARGE NOTE NURSING/CASE MANAGEMENT/SOCIAL WORK - NSDCPEWEB_GEN_ALL_CORE
[place] website --- [email or web address] Tobacco [email or web address] website --- [email or web address]

## 2019-09-06 NOTE — CHART NOTE - NSCHARTNOTEFT_GEN_A_CORE
<<<RESIDENT DISCHARGE NOTE>>>     CARLOS IVERSON  MRN-[id number]    VITAL SIGNS:  T(F): 96.9 (09-06-19 @ 05:16), Max: 96.9 (09-06-19 @ 05:16)  HR: 74 (09-06-19 @ 13:54)  BP: 140/60 (09-06-19 @ 13:54)  SpO2: 100% (09-05-19 @ 20:01)      PHYSICAL EXAMINATION:  GEN: No acute distress  PULM/CHEST: Mild wheezing bilaterally  CVS: Regular rate and rhythm, S1-S2, no murmurs  ABD: Soft, non-tender, non-distended, +BS  EXT: No edema  NEURO: AAOx3    TEST RESULTS:                        11.3   18.45 )-----------( 247      ( 06 Sep 2019 05:46 )             35.6       09-06    138  |  92<L>  |  24<H>  ----------------------------<  103<H>  3.7   |  38<H>  |  0.7    Ca    8.6      06 Sep 2019 05:46  Mg     2.3     09-06    TPro  5.9<L>  /  Alb  3.9  /  TBili  0.6  /  DBili  x   /  AST  40  /  ALT  60<H>  /  AlkPhos  48  09-06      FINAL DISCHARGE INTERVIEW:  Resident(s) Present: (Name: Yosvany Begum RN Present: (Name: Erin)    DISCHARGE MEDICATION RECONCILIATION  reviewed with Attending (Name: Dr. Vela)      DISPOSITION:   [ x ] Home,    [  ] Home with Visiting Nursing Services,   [    ]  SNF/ NH,    [   ] Acute Rehab (4A),   [   ] Other (Specify:_________)

## 2019-09-06 NOTE — DISCHARGE NOTE NURSING/CASE MANAGEMENT/SOCIAL WORK - PATIENT PORTAL LINK FT
You can access the FollowMyHealth Patient Portal offered by [facility] by registering at the following website: [email or web address]. By joining [facility]’s FollowMyHealth portal, you will also be able to view your health information using other applications (apps) compatible with our system.

## 2019-09-06 NOTE — DISCHARGE NOTE NURSING/CASE MANAGEMENT/SOCIAL WORK - NSDCPEEMAIL_GEN_ALL_CORE
[facility] for Tobacco Control email [email or web address]

## 2019-09-06 NOTE — PROGRESS NOTE ADULT - ASSESSMENT
78 y/o F with PMH of COPD, CAD, MI (1991) s/p CABG, hypothyroidism, AAA s/p repair, HTN, HLD, PVD s/p fem-pop bypass, GERD, and anxiety presented for shortness of breath. Patient was found with acute respiratory failure elevated troponin, she was admitted to CCU, started on medical management Troponin was stable,       A/P:   Acute on chronic respiratory failure:   Acute COPD exacerbation:   still with SOB, poor air entry on exam, advanced COPD.   Continue Prednisone  20mg for 3 days.   Continue DuoNeb   Pulmonary follow up outpatient  discharge home today.     NSTEMI: likely type 1  CAD s/p CABG  patient presented with chest pain, mild troponin elevated, stable for now  continue with medical management, cardiology follow up for any further workup if needed.   continue with ASA, Plavix, Lipitor and Metoprolol.     Hypothyroidism:   Continue synthroid.       #Progress Note Handoff:  Pending (specify):  none  Family discussion: with patient,   Disposition: Home today. Patient walks around, she doesn't want to see rehab or SNF

## 2019-09-06 NOTE — DISCHARGE NOTE PROVIDER - NSDCCPCAREPLAN_GEN_ALL_CORE_FT
PRINCIPAL DISCHARGE DIAGNOSIS  Diagnosis: Non-ST elevation MI (NSTEMI)  Assessment and Plan of Treatment: You came in with complaints of shortness of breath for 3 weeks and worsening. In ED, Code STEMI was called which was later cancelled by cardiology. CXR showed hyperinflation of both lungs with a questionable right lower lobe opacity. You were initially monitored in CCU for a possible intervention. You were diagnosed with NSTEMI possibly type 1 and was downgraded from CCU onto tele as no intervention was planned. TTE was done and showed Left ventricular ejection fraction, by visual estimation, is 45 to 50%.  Mildly decreased global left ventricular systolic function. You were monitored on telemetry and continued on Lovenox once daily, aspirin, plavix, metoprolol and lasix. Patient is clinically improved and waiting on the improvement and her pain is better and breathing is much improved and will be discharged today. Stanton followup with your cardiologist in 2 weeks time and take your medications regularly.      SECONDARY DISCHARGE DIAGNOSES  Diagnosis: Lung nodule  Assessment and Plan of Treatment: A nodule was found Right lower lobe  on chest imaging and will followup with Pulm in 3 months.    Diagnosis: COPD, moderate  Assessment and Plan of Treatment: Patient also had the complaints of Acute on chronic hypercapnic respiratory failure was put on Bipap Q4 on and off PRN and treated with Duonebs and Prednisone. You will be discharged on Prednisone for next 2 days 20mg once daily.

## 2019-09-06 NOTE — PROGRESS NOTE ADULT - SUBJECTIVE AND OBJECTIVE BOX
KISHOR, CARLOS  78y  Female      Patient is a 78y old  Female who presents with a chief complaint of SOB (06 Sep 2019 13:36)      INTERVAL HPI/OVERNIGHT EVENTS:  She feels better, SOB improved, no fever.   Vital Signs Last 24 Hrs  T(C): 36.1 (06 Sep 2019 05:16), Max: 36.1 (06 Sep 2019 05:16)  T(F): 96.9 (06 Sep 2019 05:16), Max: 96.9 (06 Sep 2019 05:16)  HR: 74 (06 Sep 2019 13:54) (63 - 85)  BP: 140/60 (06 Sep 2019 13:54) (117/68 - 167/71)  BP(mean): --  RR: 18 (06 Sep 2019 13:54) (17 - 18)  SpO2: 100% (05 Sep 2019 20:01) (100% - 100%)      09-05-19 @ 07:01  -  09-06-19 @ 07:00  --------------------------------------------------------  IN: 1000 mL / OUT: 0 mL / NET: 1000 mL            Consultant(s) Notes Reviewed:  [x ] YES  [ ] NO          MEDICATIONS  (STANDING):  ALBUTerol/ipratropium for Nebulization 3 milliLiter(s) Nebulizer every 6 hours  aspirin enteric coated 81 milliGRAM(s) Oral daily  atorvastatin 80 milliGRAM(s) Oral at bedtime  buDESOnide 160 MICROgram(s)/formoterol 4.5 MICROgram(s) Inhaler 2 Puff(s) Inhalation two times a day  chlorhexidine 4% Liquid 1 Application(s) Topical <User Schedule>  clopidogrel Tablet 75 milliGRAM(s) Oral daily  diltiazem    milliGRAM(s) Oral daily  docusate sodium 100 milliGRAM(s) Oral three times a day  enoxaparin Injectable 40 milliGRAM(s) SubCutaneous daily  furosemide    Tablet 40 milliGRAM(s) Oral two times a day  guaiFENesin  milliGRAM(s) Oral every 12 hours  levothyroxine 75 MICROGram(s) Oral daily  melatonin 3 milliGRAM(s) Oral at bedtime  metoprolol tartrate 12.5 milliGRAM(s) Oral two times a day  pantoprazole    Tablet 40 milliGRAM(s) Oral before breakfast  polyethylene glycol 3350 17 Gram(s) Oral two times a day  predniSONE   Tablet 40 milliGRAM(s) Oral daily  senna 2 Tablet(s) Oral at bedtime    MEDICATIONS  (PRN):  acetaminophen   Tablet .. 650 milliGRAM(s) Oral every 6 hours PRN Moderate Pain (4 - 6)  ALPRAZolam 0.25 milliGRAM(s) Oral every 12 hours PRN anxiety  ondansetron Injectable 4 milliGRAM(s) IV Push every 6 hours PRN Nausea and/or Vomiting      LABS                          11.3   18.45 )-----------( 247      ( 06 Sep 2019 05:46 )             35.6     09-06    138  |  92<L>  |  24<H>  ----------------------------<  103<H>  3.7   |  38<H>  |  0.7    Ca    8.6      06 Sep 2019 05:46  Mg     2.3     09-06    TPro  5.9<L>  /  Alb  3.9  /  TBili  0.6  /  DBili  x   /  AST  40  /  ALT  60<H>  /  AlkPhos  48  09-06          Lactate Trend        CAPILLARY BLOOD GLUCOSE            RADIOLOGY & ADDITIONAL TESTS:    Imaging Personally Reviewed:  [ ] YES  [ ] NO    HEALTH ISSUES - PROBLEM Dx:        PHYSICAL EXAM:  GENERAL: NAD, well-developed  HEAD:  Atraumatic, Normocephalic  EYES: EOMI, PERRLA, conjunctiva and sclera clear  NECK: Supple, No JVD  CHEST/LUNG: poor air entry bilateral.   HEART: Regular rate and rhythm; S1 S2  ABDOMEN: Soft, Nontender, Nondistended; Bowel sounds present  EXTREMITIES:  2+ Peripheral Pulses, No clubbing, cyanosis, or edema  PSYCH: AAOx3  NEUROLOGY: non-focal  SKIN: No rashes or lesions

## 2019-09-06 NOTE — DISCHARGE NOTE PROVIDER - CARE PROVIDER_API CALL
Timo Giles)  Cardiovascular Disease  [address] [place] [address]  Phone: [phone]  Fax: [phone]  Follow Up Time: 2 weeks    Zachariah Mauro)  Critical Care Medicine; Internal Medicine; Pulmonary Disease; Sleep Medicine  [address] [place] [address]  Phone: [phone]  Fax: [phone]  Follow Up Time: 2 weeks    Naveen Ibarra)  Geriatric Medicine; Internal Medicine  [address] [place] [address]  Phone: [phone]  Fax: [phone]  Follow Up Time: 2 weeks

## 2019-09-19 NOTE — ED PROVIDER NOTE - OBJECTIVE STATEMENT
79 yo female hx of HTN/COPD on Home O2 2L NC/ CAD s/p CABG/ CHF/ AAA/ PAD / Anxiety BIBA 2/2 SOB and having trouble breathing. 79 yo female hx of HTN/COPD on Home O2 2L NC/ CAD s/p CABG/ CHF/ AAA/ PAD / Anxiety BIBA 2/2 SOB and having trouble breathing. as per daughter, patient was admitted to hospital 2 weeks ago and was diagnosed with "water in her lungs". she was discharged last week and followed up with PMD yesterday. patient has always had mild SOB. Few hours patient started feeling more trouble breathing and they called EMS because she seems getting worst.

## 2019-09-19 NOTE — ED PROVIDER NOTE - EKG ADDITIONAL INFORMATION FREE TEXT
GENE in aVR and aVL similar compared to prior, improving on repeat EKG - no STEMI called, d/w ICU who agree on no STEMI activation

## 2019-09-19 NOTE — ED PROVIDER NOTE - CARE PLAN
Principal Discharge DX:	Acute respiratory failure with hypercapnia  Secondary Diagnosis:	COPD (chronic obstructive pulmonary disease)  Secondary Diagnosis:	CHF (congestive heart failure) Principal Discharge DX:	Acute respiratory failure with hypoxia and hypercapnia  Secondary Diagnosis:	COPD (chronic obstructive pulmonary disease)

## 2019-09-19 NOTE — ED PROVIDER NOTE - CRITICAL CARE ATTESTATION
I have personally provided the amount of critical care time documented below excluding time spent on separate procedures I have personally provided the amount of critical care time documented below concurrently with the resident/fellow.  This time excludes time spent on separate procedures and time spent teaching. I have reviewed the resident’s/fellow’s documentation and I agree with the assessment and plan of care

## 2019-09-19 NOTE — H&P ADULT - ASSESSMENT
1. acute  hypercapnic resp. distress / COPD exacerbation  2. hx- NSTEMI, CABG, hypothyroid,  PVD s/p multiple stent placements.    adm to ICU  mech vent support  IV antibx (vanco, maxipime)  broncho dilators  iv steroids

## 2019-09-19 NOTE — ED PROVIDER NOTE - CRITICAL CARE PROVIDED
additional history taking/interpretation of diagnostic studies/direct patient care (not related to procedure)/consultation with other physicians/consult w/ pt's family directly relating to pts condition/documentation/conducted a detailed discussion of DNR status

## 2019-09-19 NOTE — ED PROVIDER NOTE - ATTENDING CONTRIBUTION TO CARE
78yF HTN COPD on 2L home O2 CAD s/p CABG c/b CHF p/w SOB and resp distress.  Hx provided by pt's daughter Ayanna as pt is obtunded and minimally responsive.  Daughter felt pt was a little slow this morning as if tired out from the previous day, but did not c/o of SOB until ~5pm when she called her daughter saying she felt like she couldn't breathe.  Daughter called EMS as pt was worsening into the evening.  EMS found pt w/ inc WOB, COPD vs CHF and placed pt on CPAP.  En route with EMS, pt decompensated further, slumping over unresponsive and EMS started bagging to maintain SpO2 high 80s.    VS HR 80-100s BP 160s/110s O2 sat 88% via BVM  CONSTITUTIONAL: well developed; thin elderly white woman in extremis  HEAD: normocephalic; atraumatic  EYES: no conjunctival injection, no scleral icterus  ENT: no nasal discharge; airway clear.  NECK: supple; non tender. + full passive ROM in all directions  CARD: S1, S2 normal; no murmurs, gallops, or rubs. Regular rate and rhythm  RESP: b/l breath sounds w/ sig inc WOB, no coughing, hypoxic  EXT: dec muscle bulk diffusely, no clubbing, cyanosis or edema  SKIN: warm and dry, no lesions noted  NEURO: unresponsive, no spontaneous movements noted, no response to verbal or painful stimuli noted  PSYCH: unable to assess 2/2 critical illness    bagging continued in the ED, SpO2 up to 100% --> transitioned to BiPAP with 100% FiO2, initially maintaining SpO2 but with inc WOB  VBG pH 7.1 with pCO2 106  labs  EKG  CXR    d/w code status with pt's family - daughter Ayanna is HCP, 2 other daughters at bedside - pt was full code at last hospitalization ~2wk ago, but told family she did not want to live on machines and was tired of her illnesses; she also told them she'd be ok with temporary intubation eg for cardiac procedure  family unsure if she would want trial of intubation in this situation - agree to discuss amongst themselves and see how pt does on BiPAP and escalate if needed

## 2019-09-19 NOTE — H&P ADULT - NSHPPHYSICALEXAM_GEN_ALL_CORE
GENERAL:  77y/o  White Female poor nourished . pt orally intubated sedated on mech. vent.  NAD, resting comfortably.  HEAD:  Atraumatic, Normocephalic  EYES: EOMI, PERRLA, conjunctiva and sclera clear  NECK: Supple, No JVD, no cervical lymphadenopathy, non-tender  CHEST/LUNG: Clear to auscultation bilaterally; No wheeze, rhonchi, or rales  HEART: Regular rate and rhythm; S1&S2  ABDOMEN: Soft, Nontender, Nondistended x 4 quadrants; Bowel sounds present  EXTREMITIES:   Peripheral Pulses Present, No clubbing, no cyanosis, or no edema, no calf tenderness  PSYCH: AAOx3, unable to access  NEUROLOGY: unable to access  SKIN: WNL

## 2019-09-19 NOTE — ED PROVIDER NOTE - PROGRESS NOTE DETAILS
reviewed last admission chart, patient was full coded. family was debating if they wanted intubation family agree for trail for intubation. DR pressley approved for ICU

## 2019-09-19 NOTE — ED PROVIDER NOTE - PHYSICAL EXAMINATION
CONSTITUTIONAL: ill appearing. obtunded.   EYES: pupils 2mm b/l.   ENT: C-PAP in place  CARDIOVASCULAR: tachycardia. RRR no murmur   RESPIRATORY: poor breath sound and air movement. b/l rales noted.   GI/: round soft obese abdomen. no rebound  MS: No evidence of trauma or deformity. no pitting edema   SKIN: cold.   NEURO/PSYCH: Obtunded. minimum responds to painful stimuli

## 2019-09-19 NOTE — H&P ADULT - HISTORY OF PRESENT ILLNESS
pt is a 77 yo female brought in w/ worsening respiratory distress pt is a 77 yo female brought in w/ worsening respiratory distress starting 6pm. Pt w/extensive PMH as noted below. Pt was recently d/c from [facility]  about 1 week ago treated for pneumonia. In ER pt failed noninvasive mech vent. and was urgently orally intubated. Pt accepted to ICU by intensivist, Jennifer

## 2019-09-19 NOTE — H&P ADULT - ATTENDING COMMENTS
Pt with sob and elevated CO2 c/w COPD exacerbation, also of note is an elevated BNP.  Pt is not in florid chf, will start solumedrol, vanco and cefepime. continue vent support, lasix as needed. resume hypothyroidism meds.

## 2019-09-19 NOTE — H&P ADULT - NSHPLABSRESULTS_GEN_ALL_CORE
11.8   19.02 )-----------( 283      ( 19 Sep 2019 21:12 )             38.4       09-19    130<L>  |  90<L>  |  15  ----------------------------<  304<H>  4.3   |  28  |  0.8    Ca    9.1      19 Sep 2019 21:12    TPro  6.9  /  Alb  4.2  /  TBili  0.5  /  DBili  x   /  AST  28  /  ALT  20  /  AlkPhos  70  09-19          ABG - ( 19 Sep 2019 21:16 )  pH, Arterial: 7.10  pH, Blood: x     /  pCO2: 92    /  pO2: 83    / HCO3: 28    / Base Excess: -3.2  /  SaO2: 89                      PT/INR - ( 19 Sep 2019 21:12 )   PT: 11.20 sec;   INR: 0.97 ratio         PTT - ( 19 Sep 2019 21:12 )  PTT:37.9 sec    Lactate Trend      CARDIAC MARKERS ( 19 Sep 2019 21:12 )  x     / 0.03 ng/mL / x     / x     / x            CAPILLARY BLOOD GLUCOSE      POCT Blood Glucose.: 298 mg/dL (19 Sep 2019 20:22)        Culture Results:   No growth at 5 days. (08-29 @ 19:00)  Culture Results:   No growth at 5 days. (08-29 @ 19:00)

## 2019-09-19 NOTE — ED PROVIDER NOTE - CLINICAL SUMMARY MEDICAL DECISION MAKING FREE TEXT BOX
78yF HTN COPD CHF p/w resp distress, presents to the ED unresponsive, with hypercapneic and hypoxic resp failure (baseline pCO2 50-60, so encephalopathy likely 2/2 hypercapnia).  Pt poorly maintained on BiPAP; EKG w/o acute arrhythmia, CXR w/ chronic scarring and mild edema, but clinical picture more likely COPD than CHF (though hypercapnia might also be from imminent resp failure of any etiology).  Pt treated with solumedrol, duoneb x 3, also lasix.  Repeat BP downtrending, so now antihypertensives given - also pt unlikely to be in APE w/ normal BP.  Pt did not improve on BiPAP (inc WOB, downtrending SpO2) and family agreeable to trial of intubation, though they understand she may not survive induction and may not improve to tolerate extubation.  Pt intubated w/ improving pCO2, beginning to respond to family at bedside. D/w ICU - will adm. Family aware of grave prognosis.

## 2019-09-20 NOTE — CONSULT NOTE ADULT - ASSESSMENT
acute chronic hypercapneic resp failure  acute multilobar pneumonia due to GNR      Intubation taper FIO2 as tolerated  heavy sedation  IV fluids  empiric abx to treat GNR  full cultures includint sputum  bronchodilators  IV steroids  dvt gastritis prophylaxis  nutrition

## 2019-09-20 NOTE — CONSULT NOTE ADULT - SUBJECTIVE AND OBJECTIVE BOX
CARLOS IVERSON        Patient is a 78y old  Female who presents with a chief complaint of respiratory distress (20 Sep 2019 13:04)      HPI:  pt is a 79 yo female brought in w/ worsening respiratory distress starting 6pm. Pt w/extensive PMH as noted below. Pt was recently d/c from [facility]  about 1 week ago treated for pneumonia. In ER pt failed noninvasive mech vent. and was urgently orally intubated. Pt accepted to ICU by intensivistJennifer (19 Sep 2019 22:44)      Allergies    No Known Allergies    Intolerances        Daily Height in cm: 167.64 (19 Sep 2019 23:55)    Daily Weight in k.6 (19 Sep 2019 23:55)    I&O's Summary    19 Sep 2019 07:  -  20 Sep 2019 07:00  --------------------------------------------------------  IN: 1040 mL / OUT: 395 mL / NET: 645 mL    20 Sep 2019 07:01  -  20 Sep 2019 13:32  --------------------------------------------------------  IN: 612 mL / OUT: 235 mL / NET: 377 mL        FAMILY HISTORY:  No pertinent family history in first degree relatives      SOCIAL:    Marital Status:  ( x  )    (   ) Single    (   )    (  )   Occupation:   Lives with: (  ) alone  (  ) children   (x  ) spouse   (  ) parents  (  ) other  Recent Travel:     Substance Use (street drugs): (x  ) never used  (  ) other:  Tobacco Usage:  (   ) never smoked   (  x ) former smoker   (   ) current smoker  (     ) pack year  Alcohol Usage:        HEALTH ISSUES - PROBLEM Dx:          Vital Signs Last 24 Hrs  T(C): 38.3 (20 Sep 2019 11:07), Max: 38.3 (20 Sep 2019 11:07)  T(F): 101 (20 Sep 2019 11:07), Max: 101 (20 Sep 2019 11:07)  HR: 83 (20 Sep 2019 13:07) (64 - 91)  BP: 116/62 (20 Sep 2019 13:07) (66/44 - 178/81)  BP(mean): 83 (20 Sep 2019 13:07) (51 - 99)  RR: 22 (20 Sep 2019 13:07) (19 - 26)  SpO2: 100% (20 Sep 2019 13:07) (89% - 100%)      PT/INR - ( 19 Sep 2019 21:12 )   PT: 11.20 sec;   INR: 0.97 ratio         PTT - ( 19 Sep 2019 21:12 )  PTT:37.9 sec                          11.4   22.86 )-----------( 188      ( 20 Sep 2019 05:55 )             36.1       09-20    135  |  96<L>  |  23<H>  ----------------------------<  202<H>  5.3<H>   |  23  |  0.9    Ca    8.2<L>      20 Sep 2019 05:55    TPro  5.3<L>  /  Alb  3.4<L>  /  TBili  0.9  /  DBili  x   /  AST  185<H>  /  ALT  140<H>  /  AlkPhos  96  09-20      CARDIAC MARKERS ( 19 Sep 2019 21:12 )  x     / 0.03 ng/mL / x     / x     / x            LIVER FUNCTIONS - ( 20 Sep 2019 05:55 )  Alb: 3.4 g/dL / Pro: 5.3 g/dL / ALK PHOS: 96 U/L / ALT: 140 U/L / AST: 185 U/L / GGT: x                 CAPILLARY BLOOD GLUCOSE      POCT Blood Glucose.: 298 mg/dL (19 Sep 2019 20:22)          ABG - ( 20 Sep 2019 03:46 )  pH, Arterial: 7.41  pH, Blood: x     /  pCO2: 40    /  pO2: 178   / HCO3: 25    / Base Excess: 0.6   /  SaO2: 98                  Mode: Auto Mode: PRVC/ Volume Support  RR (machine): 22  TV (machine): 400  FiO2: 60  PEEP: 8  MAP: 15  PIP: 27      Radiology: Radiology personally reviewed.    MEDICATIONS  (STANDING):  ALBUTerol/ipratropium for Nebulization 3 milliLiter(s) Nebulizer every 6 hours  aspirin  chewable 81 milliGRAM(s) Enteral Tube daily  cefepime   IVPB 1000 milliGRAM(s) IV Intermittent every 12 hours  chlorhexidine 0.12% Liquid 15 milliLiter(s) Oral Mucosa every 12 hours  chlorhexidine 4% Liquid 1 Application(s) Topical <User Schedule>  clopidogrel Tablet 75 milliGRAM(s) Oral daily  diltiazem    Tablet 60 milliGRAM(s) Enteral Tube every 8 hours  enoxaparin Injectable 30 milliGRAM(s) SubCutaneous daily  fentaNYL   Infusion. 0.5 MICROgram(s)/kG/Hr (2.25 mL/Hr) IV Continuous <Continuous>  levothyroxine Injectable 37.5 MICROGram(s) IV Push at bedtime  methylPREDNISolone sodium succinate Injectable 40 milliGRAM(s) IV Push every 6 hours  midazolam Infusion 0.02 mG/kG/Hr (0.992 mL/Hr) IV Continuous <Continuous>  pantoprazole  Injectable 40 milliGRAM(s) IV Push daily  sodium chloride 0.9%. 1000 milliLiter(s) (50 mL/Hr) IV Continuous <Continuous>  vancomycin  IVPB 1000 milliGRAM(s) IV Intermittent every 12 hours    MEDICATIONS  (PRN):  acetaminophen  Suppository .. 650 milliGRAM(s) Rectal every 6 hours PRN Temp greater or equal to 38C (100.4F)      Prescriptions:  ALPRAZolam 0.25 mg oral tablet: 1 tab(s) orally 2 times a day MDD:max daily dose 0.5mg (19 Sep 2019 22:22)  famotidine 20 mg oral tablet: 1 tab(s) orally 2 times a day (19 Sep 2019 22:22)        REVIEW OF SYSTEMS:   Unable to obtain due to patient's condition.      PHYSICAL EXAM:   · CONSTITUTIONAL: ill appearing,   · ENMT: Airway patent, Nasal mucosa clear. Mouth with normal mucosa. Throat has no vesicles, no oropharyngeal exudates and uvula is midline.  · EYES: Clear bilaterally, pupils equal, round and reactive to light.  · CARDIAC: Normal rate, regular rhythm.  Heart sounds S1, S2.  No murmurs, rubs or gallops.  · RESPIRATORY: b/l wheezing and rales L>R  · GASTROINTESTINAL: Abdomen soft, non-tender, no guarding.  · MUSCULOSKELETAL: Spine appears normal, range of motion is not limited, no muscle or joint tenderness  · NEUROLOGICAL: Alert and oriented, no focal deficits, no motor or sensory deficits.  · SKIN: Skin normal color for race, warm, dry and intact. No evidence of rash.  · HEME LYMPH: No adenopathy or splenomegaly. No cervical or inguinal lymphadenopathy.

## 2019-09-21 NOTE — DIETITIAN INITIAL EVALUATION ADULT. - ENTERAL
Recommend adding prosource TF 45 mL q 12 hrs (2 packets/day) to current TF regimen to provide total of 1340 kcal, 74 g protein, 638 mL free water. Additional flushes per CCU team. If patient to be extubated, recommend SLP eval and diet order per SLP recommendations.

## 2019-09-21 NOTE — DIETITIAN INITIAL EVALUATION ADULT. - OTHER INFO
Patient admitted for worsening respiratory distress, and intubated. Started on OG feeds, tube feed tolerated well per RN note 9/21. Family at bedside able to provide nutrition history. Reports good appetite PTA, on regular diet. NKFA/ preferences. Takes MVI. Report per recent MD visit patient with possible aspiration, need for SLP eval s/p extubation. Tolerating TF well per RN note 9/21. Daughter reports normally stable weight between 100-105 (currently 102#).

## 2019-09-21 NOTE — CHART NOTE - NSCHARTNOTEFT_GEN_A_CORE
Patient terminally weaned per family request. liberated from ventilator @ 1800. currently comfortable on morphine and 100%Non-rebreather mask. rn samantha present. MD hoffmann & quan aware and in agreement     -Fe Whitney, RRT

## 2019-09-21 NOTE — CHART NOTE - NSCHARTNOTEFT_GEN_A_CORE
Patient's family and healthcare proxy requesting terminal weaning. Spoke at length with daughter Ayanna who expressed her mother's wishes not to be resuscitated or even intubated. Family met at length and decided to extubate the patient.  Will cancel cardiology consult. Will discontinue heparin drip, antibiotics, solumedrol. Will discontinue sedation and start the patient on a morphine drip. Palliative care consult placed. Intensivist on call informed.

## 2019-09-21 NOTE — DIETITIAN INITIAL EVALUATION ADULT. - ADD RECOMMEND
RD to monitor diet order, body composition (wt trends), nutrition focused physical findings (TF tolerance).

## 2019-09-21 NOTE — DIETITIAN INITIAL EVALUATION ADULT. - PERTINENT MEDS FT
enoxparin, aspirin, plavix, abx, IVF, acetaminophen, synthroid, versed, protonix, duoneb, diltiazem, fentanyl, methylprednisolone

## 2019-09-21 NOTE — DIETITIAN INITIAL EVALUATION ADULT. - PHYSICAL APPEARANCE
Patient is intubated, observed with mild fat wasting to orbital region/muscle wasting to temporal region. Height is 60 inches per family. BMI 19.9. Skin intact.

## 2019-09-21 NOTE — PROGRESS NOTE ADULT - ASSESSMENT
acute chronic hypercapneic resp failure  acute multilobar pneumonia due to GNR    Plan   sedation vacation and assess   lower peep to 5   possible weaning trial   follow Is and OS BMP   empiric abx to treat GNR  full cultures includint sputum  bronchodilators  IV steroids  dvt gastritis prophylaxis  nutrition
1. Acute hypercapnic Respiratory failure secondary to suspected gram negative pneumonia / COPD exacerbation/leukocytosis      - remains intubated, vent settings as per Pulmonary    - broad spectrum IV antibiotics (IV cefepime, received vanco dose)   - nebs, pulmonary toileting    - IV steroids + PPI   - follow up cultures   - added morphine and fentanyl prn for sedation     2. hx- CAD/CABG/PVD s/p multiple stent placements/elevated CE    - on ASA, Plavix (? statins), CCB   - Cardiology consult as pt with underlying CAD history   - start therapeutic lovenox in lieu of elevated Cardiac enzymes (high MONTY score) + EKG + echo + add statins (aware of LFTs)    3. Hypothyroid   - on synthroid     4. Hyponatremia   -urine lytes, serum, urine osmolarity and recheck BMP    5) Transaminitis  -likely reactive; if worsens, check RUQ sonogram and GI eval  -LFTs in am (downtrended today)    dvt and gi ppx       # Progress Note Handoff  PENDING as follows  consults: Pulmonary followup and cardiology consult   Test: daily labs   Family discussion: no family at bedside   Disposition:  ACUTE     Attending Physician Dr. Mary Bergeron # [id number]
1. acute  hypercapnic respiratory failure secondary to suspected gram negative pneumonia / COPD exacerbation     - mech vent support   - IV antibx (vanco, maxipime)   - broncho dilators   - iv steroids   - place feeding tube and start feeds   - dvt and gi prophylaxis   - I spoke with family at bedside, all questions answered         2. hx- CAD, CABG, hypothyroid,  PVD s/p multiple stent placements.     - resume home meds

## 2019-09-21 NOTE — DISCHARGE NOTE FOR THE EXPIRED PATIENT - HOSPITAL COURSE
patient came in with respiratory distress, intubated, on broad spectrum antibiotics for suspected multilobar pneumonia, in critical care unit; seen by Intensivist; hospital course complicated by tachycardia, I requested Cardiology consult and discussed with the CCU resident; later in the day patient's condition deteriorated and family requested terminal weaning and patient passed away on 09/21/19 at 19:16    *** pronounced by Dr. Garcias in CCU, but expiration note not charted; I am writing the note for the completion of the chart.

## 2019-09-21 NOTE — PROGRESS NOTE ADULT - SUBJECTIVE AND OBJECTIVE BOX
Patient is a 78y old  Female who presents with a chief complaint of respiratory distress (20 Sep 2019 16:37)      Over Night Events:  Patient seen and examined.   still vented     ROS:  See HPI    PHYSICAL EXAM    ICU Vital Signs Last 24 Hrs  T(C): 37.1 (21 Sep 2019 03:01), Max: 38.3 (20 Sep 2019 11:07)  T(F): 98.7 (21 Sep 2019 03:01), Max: 101 (20 Sep 2019 11:07)  HR: 114 (21 Sep 2019 07:00) (72 - 114)  BP: 139/82 (21 Sep 2019 07:00) (91/53 - 166/97)  BP(mean): 103 (21 Sep 2019 07:00) (68 - 124)  ABP: --  ABP(mean): --  RR: 20 (21 Sep 2019 07:00) (20 - 26)  SpO2: 98% (21 Sep 2019 07:00) (98% - 100%)      General: on sedation   HEENT:        et tube         Lymph Nodes: NO cervical LN   Lungs: Bilateral  crackles  Cardiovascular: Regular   Abdomen: Soft, Positive BS  Extremities: No clubbing   Skin: warm   Neurological: on sedation   Musculoskeletal: move all ext     I&O's Detail    20 Sep 2019 07:01  -  21 Sep 2019 07:00  --------------------------------------------------------  IN:    IV PiggyBack: 350 mL    midazolam Infusion: 48 mL    Osmolite: 360 mL    sodium chloride 0.9%.: 1200 mL  Total IN: 1958 mL    OUT:    Indwelling Catheter - Urethral: 760 mL  Total OUT: 760 mL    Total NET: 1198 mL          LABS:                          10.4   20.06 )-----------( 222      ( 21 Sep 2019 05:52 )             31.9         21 Sep 2019 05:52    132    |  96     |  30     ----------------------------<  163    4.7     |  23     |  0.6      Ca    8.2        21 Sep 2019 05:52    TPro  5.4    /  Alb  3.6    /  TBili  0.5    /  DBili  x      /  AST  88     /  ALT  94     /  AlkPhos  88     21 Sep 2019 05:52  Amylase x     lipase x                                                 PT/INR - ( 19 Sep 2019 21:12 )   PT: 11.20 sec;   INR: 0.97 ratio         PTT - ( 19 Sep 2019 21:12 )  PTT:37.9 sec                                       Urinalysis Basic - ( 20 Sep 2019 17:36 )    Color: Yellow / Appearance: Clear / SG: >=1.030 / pH: x  Gluc: x / Ketone: Negative  / Bili: Negative / Urobili: 0.2 mg/dL   Blood: x / Protein: Negative mg/dL / Nitrite: Negative   Leuk Esterase: Negative / RBC: x / WBC x   Sq Epi: x / Non Sq Epi: x / Bacteria: x          CARDIAC MARKERS ( 19 Sep 2019 21:12 )  x     / 0.03 ng/mL / x     / x     / x                                                                                                         Mode: AC/ CMV (Assist Control/ Continuous Mandatory Ventilation)  RR (machine): 22  TV (machine): 400  FiO2: 40  PEEP: 8  MAP: 14  PIP: 26                                      ABG - ( 21 Sep 2019 05:26 )  pH, Arterial: 7.46  pH, Blood: x     /  pCO2: 36    /  pO2: 106   / HCO3: 26    / Base Excess: 2.0   /  SaO2: 98                  MEDICATIONS  (STANDING):  ALBUTerol/ipratropium for Nebulization 3 milliLiter(s) Nebulizer every 6 hours  aspirin  chewable 81 milliGRAM(s) Enteral Tube daily  cefepime   IVPB 1000 milliGRAM(s) IV Intermittent every 12 hours  chlorhexidine 0.12% Liquid 15 milliLiter(s) Oral Mucosa every 12 hours  chlorhexidine 4% Liquid 1 Application(s) Topical <User Schedule>  clopidogrel Tablet 75 milliGRAM(s) Oral daily  diltiazem    Tablet 60 milliGRAM(s) Enteral Tube every 8 hours  enoxaparin Injectable 30 milliGRAM(s) SubCutaneous daily  fentaNYL   Infusion. 0.5 MICROgram(s)/kG/Hr (2.25 mL/Hr) IV Continuous <Continuous>  levothyroxine Injectable 37.5 MICROGram(s) IV Push at bedtime  methylPREDNISolone sodium succinate Injectable 40 milliGRAM(s) IV Push every 6 hours  midazolam Infusion 0.02 mG/kG/Hr (0.992 mL/Hr) IV Continuous <Continuous>  pantoprazole  Injectable 40 milliGRAM(s) IV Push daily  sodium chloride 0.9%. 1000 milliLiter(s) (50 mL/Hr) IV Continuous <Continuous>    MEDICATIONS  (PRN):  acetaminophen  Suppository .. 650 milliGRAM(s) Rectal every 6 hours PRN Temp greater or equal to 38C (100.4F)          Xrays:  TLC:  OG:  ET tube:                                                                                    stable    ECHO:  [facility]:
[facility] [place] [facility]  Last 24hr Events & Subjective:  Admitted overnight and intubated for acute hypercapneic respiratory failure, difficuty placing OGT overnight, placed this afternoon with hospitalist.  Per family pt at her baseline doing everything on her own as before other than driving the car. Started having nonspecific symptoms yesterday and then suddenly couldn't breath and found to be hypercapnic by EMS. No complaints of cough, fevers or chills.    Today pt intubated, sedated, family at bedside.    Past Medical Hx:  History of myocardial infarction  Anxiety  Gastroesophageal reflux disease without esophagitis  Essential hypertension  Abdominal aortic aneurysm (AAA) without rupture  Chronic obstructive pulmonary disease with acute exacerbation  Peripheral vascular disease  Coronary artery disease involving native coronary artery of native heart without angina pectoris  Postablative hypothyroidism  Diverticulitis  Graves disease  Hyperthyroidism  Shingles  MI (myocardial infarction)    Past Surgical Hx:  S/P CABG (coronary artery bypass graft)  History of femoropopliteal bypass  After cataract, bilateral  H/O angioplasty  H/O: hysterectomy  History of cholecystectomy    Allergies:  No Known Allergies    Current Meds:  Standing Meds  ALBUTerol/ipratropium for Nebulization 3 milliLiter(s) Nebulizer every 6 hours  aspirin  chewable 81 milliGRAM(s) Enteral Tube daily  cefepime   IVPB 1000 milliGRAM(s) IV Intermittent every 12 hours  chlorhexidine 0.12% Liquid 15 milliLiter(s) Oral Mucosa every 12 hours  chlorhexidine 4% Liquid 1 Application(s) Topical <User Schedule>  clopidogrel Tablet 75 milliGRAM(s) Oral daily  diltiazem    Tablet 60 milliGRAM(s) Enteral Tube every 8 hours  enoxaparin Injectable 30 milliGRAM(s) SubCutaneous daily  fentaNYL   Infusion. 0.5 MICROgram(s)/kG/Hr IV Continuous <Continuous>  levothyroxine Injectable 37.5 MICROGram(s) IV Push at bedtime  methylPREDNISolone sodium succinate Injectable 40 milliGRAM(s) IV Push every 6 hours  midazolam Infusion 0.02 mG/kG/Hr IV Continuous <Continuous>  pantoprazole  Injectable 40 milliGRAM(s) IV Push daily  sodium chloride 0.9%. 1000 milliLiter(s) IV Continuous <Continuous>  vancomycin  IVPB 1000 milliGRAM(s) IV Intermittent every 12 hours    PRN Meds  acetaminophen  Suppository .. 650 milliGRAM(s) Rectal every 6 hours PRN    Vital Signs  T(F): 100.3 (09-20-19 @ 15:01), Max: 101 (09-20-19 @ 11:07)  HR: 80 (09-20-19 @ 15:09) (64 - 91)  BP: 154/77 (09-20-19 @ 15:09) (66/44 - 178/81)  BP(mean): 108 (09-20-19 @ 15:09) (51 - 108)  ABP: --  ABP(mean): --  RR: 22 (09-20-19 @ 15:09) (19 - 26)  SpO2: 100% (09-20-19 @ 15:09) (89% - 100%)  Fluid Balance    09-19-19 @ 07:01  -  09-20-19 @ 07:00  --------------------------------------------------------  IN:    fentaNYL Infusion.: 32 mL    IV PiggyBack: 250 mL    midazolam Infusion: 8 mL    Sodium Chloride 0.9% IV Bolus: 500 mL    sodium chloride 0.9%.: 250 mL  Total IN: 1040 mL    OUT:    Indwelling Catheter - Urethral: 395 mL  Total OUT: 395 mL    Total NET: 645 mL      09-20-19 @ 07:01  -  09-20-19 @ 16:38  --------------------------------------------------------  IN:    IV PiggyBack: 300 mL    midazolam Infusion: 20 mL    sodium chloride 0.9%.: 500 mL  Total IN: 820 mL    OUT:    Indwelling Catheter - Urethral: 305 mL  Total OUT: 305 mL    Total NET: 515 mL          Physical Exam:  GENERAL: NAD, intubated  HEENT: NCAT  CHEST/LUNG: CTAB  HEART: Regular rate and rhythm; s1 s2 appreciated, No murmurs, rubs, or gallops  ABDOMEN: Soft, Nontender, Nondistended; Bowel sounds present  EXTREMITIES: No LE edema b/l  NERVOUS SYSTEM:  sedated    LINES/CATHETERS: Loja, OGT    Labs:                         11.4<L>  22.86<H>   )-----------(   188      ( 20 Sep 2019 05:55 )              36.1<L>    Neutro% 76.2<H>, Lympho% 17.9<L>, Mono% 5.0  , Bands 0.7<H>  20 Sep 2019 05:55    135    |  96     |  23     ----------------------------<  202    5.3     |  23     |  0.9      Ca    8.2        20 Sep 2019 05:55    TPro  5.3    /  Alb  3.4    /  TBili  0.9    /  DBili  x      /  AST  185    /  ALT  140    /  AlkPhos  96     20 Sep 2019 05:55          pTT     37.9  ----------< 0.97 INR/ 09-19-19 @ 21:12     11.20      PT      Troponin 0.03, CKMB --, CK --/ 09-19-19 @ 21:12    Hemoglobin A1C, Whole Blood: 5.4 % (08-30-19 @ 05:36)      Imaging & EKG:  < from: Xray Chest 1 View-PORTABLE IMMEDIATE (09.20.19 @ 15:15) >  Impression:    Right opacity/pleural effusion, stable. Left opacity/pleural effusion,   decreased. Support devices in place.  < end of copied text >      Assessment & Plan:  IMPRESSION:  Acute hypercapnic respiratory failure due to likely gram negative multi lobar pneumonia  Hypothyroid      PLAN  NEURO:   -keep sedated for now with versed  -SAT in AM    PULMONARY:   -c/w vent, decreased RR and FiO2    CARDIOVASCULAR:   -hold hypertensive meds for now (was hypotensive overnight)  -c/w ASA, plavix  -keep I=O, IVFs 50cc/hr    GASTROENTEROLOGY:   -start OGT tube feeds  -GI ppx    INFECTIOUS DISEASES:   -MRSA negative, will d/c Vancomycion  -c/w cefepime  -c/w steroids for now  -monitor for fevers, tylenol PRN  -monitor WBCs  -check Bcx, Ucx, sputum cx    HEMATOLOGY/ONCOLOGY:   -DVT ppx lovenox    GENITOURINARY/RENAL:   -monitor and correct electrolytes  -strict I/O    ENDOCRINE:   -monitor glucose  -c/w levothyroxine    MUSCULOSKELETAL:   -bedrest    DISPO:   -MICU monitoring    CODE STATUS:  -FULL CODE
Patient is a 78y old  Female who presents with a chief complaint of respiratory distress (19 Sep 2019 22:44)      T(F): 101 (09-20-19 @ 11:07), Max: 101 (09-20-19 @ 11:07)  HR: 83 (09-20-19 @ 11:07)  BP: 109/63 (09-20-19 @ 11:07)  RR: 26 (09-20-19 @ 11:07)  SpO2: 100% (09-20-19 @ 11:07) (89% - 100%)    PHYSICAL EXAM:  GENERAL: NAD, sedated on ventilator   HEAD:  Atraumatic, Normocephalic  NERVOUS SYSTEM:  no focal deficits  CHEST/LUNG:  bilateral rhonchi  HEART: Regular rate and rhythm; No murmurs, rubs, or gallops  ABDOMEN: Soft, Nontender, Nondistended; Bowel sounds present  EXTREMITIES:  2+ Peripheral Pulses, No clubbing, cyanosis, or edema      LABS  09-20    135  |  96<L>  |  23<H>  ----------------------------<  202<H>  5.3<H>   |  23  |  0.9    Ca    8.2<L>      20 Sep 2019 05:55    TPro  5.3<L>  /  Alb  3.4<L>  /  TBili  0.9  /  DBili  x   /  AST  185<H>  /  ALT  140<H>  /  AlkPhos  96  09-20                          11.4   22.86 )-----------( 188      ( 20 Sep 2019 05:55 )             36.1     PT/INR - ( 19 Sep 2019 21:12 )   PT: 11.20 sec;   INR: 0.97 ratio         PTT - ( 19 Sep 2019 21:12 )  PTT:37.9 sec  Mode: Auto Mode: PRVC/ Volume Support  RR (machine): 22  TV (machine): 400  FiO2: 60  PEEP: 8  MAP: 15  PIP: 27    CARDIAC ENZYMES    Troponin T, Serum: 0.03 ng/mL (09-19-19 @ 21:12)    Culture Results:   No growth at 5 days. (08-29-19)  Culture Results:   No growth at 5 days. (08-29-19)    RADIOLOGY  < from: Xray Chest 1 View-PORTABLE IMMEDIATE (09.20.19 @ 10:34) >  Impression:      Right opacity/pleural effusion, decreased. Left opacity/pleural effusion,   stable. Support device in place.    < end of copied text >    MEDICATIONS  (STANDING):  ALBUTerol/ipratropium for Nebulization 3 milliLiter(s) Nebulizer every 6 hours  aspirin  chewable 81 milliGRAM(s) Enteral Tube daily  cefepime   IVPB 1000 milliGRAM(s) IV Intermittent every 12 hours  chlorhexidine 0.12% Liquid 15 milliLiter(s) Oral Mucosa every 12 hours  chlorhexidine 4% Liquid 1 Application(s) Topical <User Schedule>  clopidogrel Tablet 75 milliGRAM(s) Oral daily  diltiazem    Tablet 60 milliGRAM(s) Enteral Tube every 8 hours  enoxaparin Injectable 30 milliGRAM(s) SubCutaneous daily  fentaNYL   Infusion. 0.5 MICROgram(s)/kG/Hr (2.25 mL/Hr) IV Continuous <Continuous>  levothyroxine Injectable 37.5 MICROGram(s) IV Push at bedtime  methylPREDNISolone sodium succinate Injectable 40 milliGRAM(s) IV Push every 6 hours  midazolam Infusion 0.02 mG/kG/Hr (0.992 mL/Hr) IV Continuous <Continuous>  pantoprazole  Injectable 40 milliGRAM(s) IV Push daily  sodium chloride 0.9%. 1000 milliLiter(s) (50 mL/Hr) IV Continuous <Continuous>  vancomycin  IVPB 1000 milliGRAM(s) IV Intermittent every 12 hours    MEDICATIONS  (PRN):  acetaminophen  Suppository .. 650 milliGRAM(s) Rectal every 6 hours PRN Temp greater or equal to 38C (100.4F)
Patient is a 78y old  Female who presents with a chief complaint of respiratory distress (19 Sep 2019 22:44)    pt seen and examined at bedside in CCU  -overnight events noted; tachycardiac, required BB, added sedation, cardio aware, discussed with CCU resident   -pt remains intubated and vent settings as per Pulmonary team   -prognosis guarded     Vital Signs Last 24 Hrs  T(C): 37.8 (21 Sep 2019 11:01), Max: 37.9 (20 Sep 2019 15:01)  T(F): 100 (21 Sep 2019 11:01), Max: 100.3 (20 Sep 2019 15:01)  HR: 98 (21 Sep 2019 11:00) (72 - 120)  BP: 129/71 (21 Sep 2019 11:00) (116/62 - 166/97)  BP(mean): 94 (21 Sep 2019 11:00) (79 - 124)  RR: 22 (21 Sep 2019 11:01) (20 - 23)  SpO2: 98% (21 Sep 2019 11:00) (92% - 100%)    PHYSICAL EXAM:  GENERAL: NAD, sedated on ventilator   HEAD:  Atraumatic, Normocephalic  NERVOUS SYSTEM:  no focal deficits  CHEST/LUNG:  bilateral rhonchi  HEART: Regular rate and rhythm; No murmurs, rubs, or gallops  ABDOMEN: Soft, Nontender, Nondistended; Bowel sounds present  EXTREMITIES:  2+ Peripheral Pulses, No clubbing, cyanosis, or edema      LABS                        10.5   30.24 )-----------( 252      ( 21 Sep 2019 09:15 )             32.7     09-21    132<L>  |  96<L>  |  30<H>  ----------------------------<  163<H>  4.7   |  23  |  0.6<L>    Ca    8.2<L>      21 Sep 2019 05:52    TPro  5.4<L>  /  Alb  3.6  /  TBili  0.5  /  DBili  x   /  AST  88<H>  /  ALT  94<H>  /  AlkPhos  88  09-21       PTT - ( 19 Sep 2019 21:12 )  PTT:37.9 sec  Mode: Auto Mode: PRVC/ Volume Support  RR (machine): 22  TV (machine): 400  FiO2: 60  PEEP: 8  MAP: 15  PIP: 27    CARDIAC ENZYMES    Troponin T, Serum: 0.03 ng/mL (09-19-19 @ 21:12)    Culture Results:   No growth at 5 days. (08-29-19)  Culture Results:   No growth at 5 days. (08-29-19)    RADIOLOGY  < from: Xray Chest 1 View-PORTABLE IMMEDIATE (09.20.19 @ 10:34) >  Impression:      Right opacity/pleural effusion, decreased. Left opacity/pleural effusion,   stable. Support device in place.    < end of copied text >    MEDICATIONS  (STANDING):  aspirin  chewable 81 milliGRAM(s) Enteral Tube daily  cefepime   IVPB 1000 milliGRAM(s) IV Intermittent every 12 hours  chlorhexidine 0.12% Liquid 15 milliLiter(s) Oral Mucosa every 12 hours  chlorhexidine 4% Liquid 1 Application(s) Topical <User Schedule>  clopidogrel Tablet 75 milliGRAM(s) Oral daily  diltiazem    Tablet 60 milliGRAM(s) Enteral Tube every 8 hours  enoxaparin Injectable 30 milliGRAM(s) SubCutaneous daily  fentaNYL   Infusion. 0.5 MICROgram(s)/kG/Hr (2.25 mL/Hr) IV Continuous <Continuous>  levothyroxine Injectable 37.5 MICROGram(s) IV Push at bedtime  methylPREDNISolone sodium succinate Injectable 40 milliGRAM(s) IV Push every 6 hours  midazolam Infusion 0.02 mG/kG/Hr (0.992 mL/Hr) IV Continuous <Continuous>  pantoprazole  Injectable 40 milliGRAM(s) IV Push daily  sodium chloride 0.9%. 1000 milliLiter(s) (50 mL/Hr) IV Continuous <Continuous>    MEDICATIONS  (PRN):  acetaminophen  Suppository .. 650 milliGRAM(s) Rectal every 6 hours PRN Temp greater or equal to 38C (100.4F)

## 2019-09-21 NOTE — DIETITIAN INITIAL EVALUATION ADULT. - NUTRITIONGOAL OUTCOME1
Patient to receive and tolerate >85% and <110% of estimated needs via TF regimen upon f/u in 3 days.

## 2019-10-02 DIAGNOSIS — K21.9 GASTRO-ESOPHAGEAL REFLUX DISEASE WITHOUT ESOPHAGITIS: ICD-10-CM

## 2019-10-02 DIAGNOSIS — I71.4 ABDOMINAL AORTIC ANEURYSM, WITHOUT RUPTURE: ICD-10-CM

## 2019-10-02 DIAGNOSIS — E89.0 POSTPROCEDURAL HYPOTHYROIDISM: ICD-10-CM

## 2019-10-02 DIAGNOSIS — J96.22 ACUTE AND CHRONIC RESPIRATORY FAILURE WITH HYPERCAPNIA: ICD-10-CM

## 2019-10-02 DIAGNOSIS — Z99.81 DEPENDENCE ON SUPPLEMENTAL OXYGEN: ICD-10-CM

## 2019-10-02 DIAGNOSIS — Z79.82 LONG TERM (CURRENT) USE OF ASPIRIN: ICD-10-CM

## 2019-10-02 DIAGNOSIS — J44.1 CHRONIC OBSTRUCTIVE PULMONARY DISEASE WITH (ACUTE) EXACERBATION: ICD-10-CM

## 2019-10-02 DIAGNOSIS — I50.9 HEART FAILURE, UNSPECIFIED: ICD-10-CM

## 2019-10-02 DIAGNOSIS — D72.829 ELEVATED WHITE BLOOD CELL COUNT, UNSPECIFIED: ICD-10-CM

## 2019-10-02 DIAGNOSIS — E87.1 HYPO-OSMOLALITY AND HYPONATREMIA: ICD-10-CM

## 2019-10-02 DIAGNOSIS — J15.6 PNEUMONIA DUE TO OTHER GRAM-NEGATIVE BACTERIA: ICD-10-CM

## 2019-10-02 DIAGNOSIS — F41.9 ANXIETY DISORDER, UNSPECIFIED: ICD-10-CM

## 2019-10-02 DIAGNOSIS — Z95.1 PRESENCE OF AORTOCORONARY BYPASS GRAFT: ICD-10-CM

## 2019-10-02 DIAGNOSIS — J44.0 CHRONIC OBSTRUCTIVE PULMONARY DISEASE WITH (ACUTE) LOWER RESPIRATORY INFECTION: ICD-10-CM

## 2019-10-02 DIAGNOSIS — Z79.52 LONG TERM (CURRENT) USE OF SYSTEMIC STEROIDS: ICD-10-CM

## 2019-10-02 DIAGNOSIS — J96.21 ACUTE AND CHRONIC RESPIRATORY FAILURE WITH HYPOXIA: ICD-10-CM

## 2019-10-02 DIAGNOSIS — I25.10 ATHEROSCLEROTIC HEART DISEASE OF NATIVE CORONARY ARTERY WITHOUT ANGINA PECTORIS: ICD-10-CM

## 2019-10-02 DIAGNOSIS — I11.0 HYPERTENSIVE HEART DISEASE WITH HEART FAILURE: ICD-10-CM

## 2019-10-02 DIAGNOSIS — I73.9 PERIPHERAL VASCULAR DISEASE, UNSPECIFIED: ICD-10-CM

## 2019-10-02 DIAGNOSIS — Z87.891 PERSONAL HISTORY OF NICOTINE DEPENDENCE: ICD-10-CM

## 2019-10-02 DIAGNOSIS — I25.2 OLD MYOCARDIAL INFARCTION: ICD-10-CM

## 2019-10-02 DIAGNOSIS — R74.0 NONSPECIFIC ELEVATION OF LEVELS OF TRANSAMINASE AND LACTIC ACID DEHYDROGENASE [LDH]: ICD-10-CM

## 2019-10-02 DIAGNOSIS — Z51.5 ENCOUNTER FOR PALLIATIVE CARE: ICD-10-CM

## 2019-10-02 DIAGNOSIS — G93.40 ENCEPHALOPATHY, UNSPECIFIED: ICD-10-CM

## 2019-10-02 DIAGNOSIS — Z79.02 LONG TERM (CURRENT) USE OF ANTITHROMBOTICS/ANTIPLATELETS: ICD-10-CM

## 2019-12-13 NOTE — DISCHARGE NOTE ADULT - ABILITY TO HEAR (WITH HEARING AID OR HEARING APPLIANCE IF NORMALLY USED):
Adequate: hears normal conversation without difficulty
PROVIDER:[TOKEN:[[id number]],FOLLOWUP:[1-3 Days]]

## 2020-01-07 NOTE — ED ADULT NURSE NOTE - SUICIDE SCREENING QUESTION 2
CONSTITUTIONAL: Well-developed; well-nourished; in no acute distress.   SKIN: warm, dry.   HEAD: Normocephalic; atraumatic.  EYES: PERRL, EOMI, no conjunctival erythema  ENT: No nasal discharge; airway clear.  NECK: Supple; non tender.  CARD: S1, S2 normal; no murmurs, gallops, or rubs. Regular rate and rhythm.   RESP: No wheezes, rales or rhonchi.  ABD: soft ntnd.   EXT: Normal ROM.  No clubbing, cyanosis or edema. DP pulses 2+ bilaterally.   BACK: Minimal tenderness to palpation of the paraspinal muscles bilaterally.   NEURO: Alert, oriented, grossly unremarkable.   PSYCH: Cooperative, appropriate.
Patient unable to complete

## 2020-02-10 NOTE — BEHAVIORAL HEALTH ASSESSMENT NOTE - NS ED [facility] MED ROS MUSCULOSKELETAL
Detail Level: Zone
Treatment Areas: face
Blade: 10 blade scalpel
Post-Care Instructions: I reviewed with the patient in detail post-care instructions.
Post-Procedure Instructions: Following the dermaplane treatment, The pumpkin enzyme mask was applied and removed with a hot towel after 5 minutes. The phyto mask was then applied and removed with warm sponges after 5 minutes. Serums and moisturizer was applied before applying spf.  Cicalfate was mixed in with PM therapy and applied post treatment.
Price (Use Numbers Only, No Special Characters Or $): 50.00
Pre-Procedure Text: The patient was placed in a recumbant position on the procedure table. A surface cleanse was performed to remove makeup, followed by a treatment cleanse and tone.
No complaints

## 2022-06-03 NOTE — CDI QUERY NOTE - NSCDIOTHERTXTBX_GEN_ALL_CORE_HH
Called patient to ask him about his Omnipod 5 kit prior authorization. Patient's insurance sent over a form for omnipod Dash which patient is already on. Will attempt to contact patient at a later date.
8/29/19 > 78y F w/ PMH of COPD on 1.5L O2 PRN, CAD s/p stent and CABG, hypothyroidism, AAA s/p repair, HTN, HLD, PVD s/p fem-pop bypass, GERD, and anxiety presented  with intermittent sharp chest pain with radiation to the back for the past 3 days. SOB    Troponin elevated  Code STEMI was activated in ED, cancelled by cardiology.       9/3 PN Reflect NSTEMI , has extensive cardiac history, c/w  c/w aspirin, plavix, metoprolol, and statin. repeat TTE shows Left ventricular ejection fraction, by visual estimation, is 45 to 50%.  Mildly decreased global left ventricular systolic function.           For accurate coding assignment, Please Clarify DX of NSTEMI    ?	Type I  ?	Type 2  ?	Demand Ischemia  ?	Other, Please Specify  ?	Unknown/Unable to determine

## 2023-03-21 NOTE — PATIENT PROFILE ADULT - PATIENT REPRESENTATIVE: ( YOU CAN CHOOSE ANY PERSON THAT CAN ASSIST YOU WITH YOUR HEALTH CARE PREFERENCES, DOES NOT HAVE TO BE A SPOUSE, IMMEDIATE FAMILY OR SIGNIFICANT OTHER/PARTNER)
Pharmacy faxed in a request for prior authorization on:    Medication: verapamil SR 360mg   Dosage: 1 capsule nightly  Quantity requested:  90  Pharmacy for prescription has been selected.    Initiation of prior authorization needed.   ID# [id number]  
Verapamil Approved  Auth Number: [id number]  03/21/23 through until further notice  Filling [facility]: [facility]  Notes:           
Verapamil Pending    Insurance response  Prescription Drug Insurance: [facility]  Notes: Prior authorization submitted - will update provider when decision has been made by insurance.       Key: [id number]    
declines

## 2024-03-29 NOTE — PATIENT PROFILE ADULT - FAMILY NEEDS
"   no

## 2024-10-10 NOTE — CONSULT NOTE ADULT - CONSULT REQUESTED DATE/TIME
Department of Medicine  Division of Pulmonary, Critical Care, and Sleep Medicine  Consultation  [address] [facility]  Consults     I was asked by Jackie Pedraza MD to evaluate Karly Horton for lung mass(es). I have independently interviewed and examined the patient and reviewed available records.    Physician HPI (10/10/2024):  Karly Horton is a 77 y.o. year old female patient with PMHx of COPD, >60 pack year smoking history, PUD, anemia, and chronic headaches 2/2 TMJ.  She initially presented to [facility] on 10/5 with AMS after her daughter found her unresponsive on the floor. At baseline patient is alert and oriented x 4. Per her daughter pt was coughing up blood and mucus and was disoriented. Pt denies any episodes of hemoptysis. Does endorse a cough productive of clear sputum throughout the day for years. An initial workup was done in the [facility] emergency room and the patient's toxin was positive for marijuana. There was concern for possible stroke and CT Head and CT angio Head were obtained. Both were negative for any acute intracranial process however Ct angio revealed a Spiculated nodule in the posterior right upper lobe is concerning for malignancy prompting CTAP which revealed multiple masses identified within the right and left lobes of the liver more numerous and larger on the right with particular note made of a large 3.1 cm lower pole mass right hilum in a 2.2 cm spiculated mass apicoposterior segment right upper lobe. Pulmonology was consulted for RUL mass that requires bronch with biopsy    All other review of systems are negative and/or non-contributory.     Immunization History:  Immunization History   Administered Date(s) Administered    SARS-CoV-2, Unspecified 10/14/2021       Family History:  Family History   Problem Relation Name Age of Onset    Cancer Mother      Other (Father had hypertension, stroke, coronary artery disease and diabetes) Father         Social  History:  Social History     Socioeconomic History    Marital status:    Tobacco Use    Smoking status: Every Day     Current packs/day: 1.00     Average packs/day: 1 pack/day for 64.8 years (64.8 ttl pk-yrs)     Types: Cigarettes     Start date: 1/1/1960     Social Determinants of Health     Financial Resource Strain: Low Risk  (10/10/2024)    Overall Financial Resource Strain (CARDIA)     Difficulty of Paying Living Expenses: Not hard at all   Food Insecurity: No Food Insecurity (10/8/2024)    Hunger Vital Sign     Worried About Running Out of Food in the Last Year: Never true     Ran Out of Food in the Last Year: Never true   Transportation Needs: No Transportation Needs (10/10/2024)    PRAPARE - Transportation     Lack of Transportation (Medical): No     Lack of Transportation (Non-Medical): No   Physical Activity: Inactive (10/8/2024)    Exercise Vital Sign     Days of Exercise per Week: 0 days     Minutes of Exercise per Session: 0 min   Stress: No Stress Concern Present (10/8/2024)    Citizen of the [place] of Occupational Health - Occupational Stress Questionnaire     Feeling of Stress : Not at all   Social Connections: Socially Isolated (10/8/2024)    Social Connection and Isolation Panel [NHANES]     Frequency of Communication with Friends and Family: More than three times a week     Frequency of Social Gatherings with Friends and Family: More than three times a week     Attends Islam Services: Never     Active Member of Clubs or Organizations: No     Attends Club or Organization Meetings: Never     Marital Status:    Intimate Partner Violence: Not At Risk (10/8/2024)    Humiliation, Afraid, Rape, and Kick questionnaire     Fear of Current or Ex-Partner: No     Emotionally Abused: No     Physically Abused: No     Sexually Abused: No   Housing Stability: Low Risk  (10/10/2024)    Housing Stability Vital Sign     Unable to Pay for Housing in the Last Year: No     Number of Times Moved in the  20-Sep-2019 13:32 Last Year: 1     Homeless in the Last Year: No       Current Medications:  Current Outpatient Medications   Medication Instructions    acetaminophen (Tylenol) 325 mg tablet 1-2 tablets, oral, Every 6 hours PRN    amLODIPine (NORVASC) 10 mg, oral, Nightly, Hold for systolic blood pressure < 110 mmhg.    ascorbic acid (VITAMIN C) 500 mg, oral, Daily    baclofen (LIORESAL) 10 mg, oral, 3 times daily    butalbital-acetaminophen-caff -40 mg tablet 1 tablet, oral, Every 6 hours PRN    ferrous sulfate, 325 mg ferrous sulfate, tablet 1 tablet, oral, Daily with breakfast    lactulose 30 g, oral, Daily    lidocaine HCL 4 % liquid roll-on 1 Application, topical (top), Daily PRN    losartan (COZAAR) 50 mg, oral, Daily, Hold for systolic blood pressure < 110 mmhg.    lubricating eye drops ophthalmic solution 1 drop, Both Eyes, As needed    meprobamate (EQUANIL) 400 mg, oral, 4 times daily    nicotine (Nicoderm CQ) 21 mg/24 hr patch 1 patch, transdermal, Daily    pantoprazole (PROTONIX) 40 mg, oral, 2 times daily, Do not crush, chew, or split. Continue twice daily x 8 weeks then continue once daily.    sucralfate (CARAFATE) 1 g, oral, Every 6 hours scheduled        Drug Allergies/Intolerances:  No Known Allergies     Physical Examination:  /67   Pulse 82   Temp 36.3 °C (97.3 °F)   Resp 17   Ht 1.524 m (5')   Wt (!) 31.4 kg (69 lb 4.8 oz)   SpO2 96%   BMI 13.53 kg/m²      General: ambulated independently; no acute distress; well-nourished; work of breathing was not increased; normal vocal character  HEENT: normocephalic; anicteric sclerae; conjunctivae not injected; nasal mucosa was unremarkable; oropharynx was clear without evidence of thrush; dentition was good.  Neck: supple; no lymphadenopathy or thyromegaly.  Chest: clear to auscultation bilaterally; no chest wall deformity.  Cardiac: regular rhythm; no gallop or murmur.  Abdomen: soft; non-tender; non-distended; no hepatosplenomegaly.  Extremities: no leg  edema; no digital clubbing; 2+ pulses  Psychiatric: did not appear depressed or anxious.    Pulmonary Function Test Results     Failed to redirect to the Timeline version of the [employer] SmartLink.      Chest Radiograph     XR chest 1 view 09/02/2024    Narrative  Interpreted By:  Aldair Buck,  STUDY:  XR CHEST 1 VIEW;  9/2/2024 9:44 pm    INDICATION:  Signs/Symptoms:shortness of breath.    COMPARISON:  09/02/2024    ACCESSION NUMBER(S):  [id number]    ORDERING CLINICIAN:  KATHIE AGUIAR    FINDINGS:      The cardiomediastinal silhouette and pulmonary vasculature are within  normal limits. Diffuse bronchial thickening is noted. There is mild  right basilar atelectasis. There is bibasilar scarring.    Impression  Generalized bronchial thickening without evidence of pneumonia or  pulmonary edema.      MACRO:  None.    Signed by: Aldair Buck 9/2/2024 10:12 PM  Dictation workstation:   [id number]      Echocardiogram     No results found for this or any previous visit from the past 365 days.       Chest CT Scan     No results found for this or any previous visit from the past 365 days.       Assessment and Plan / Recommendations:  Problem List Items Addressed This Visit       * (Principal) Lung mass - Primary    Relevant Orders    Bronchoscopy Diagnostic      77 year old F with PMH of COPD, >60 pack year smoking history, presenting with newly found hilar and RUL lung masses concerning for malignancy.     # Right upper lung Mass  In a symptomatic patient, coughing up blood and mucus, imaging was done that showed a 2.2 cm spiculated mass apicoposterior segment right upper lobe. Given the history of extensive smoking, this is highly suspicious for lung carcinoma. Plan will be for patient to get diagnostic bronchoscopy.  - Add- on bronchoscopy today 10/10/2024  - Patient is already NPO, heparin is held, not on any anti-platelet medications    Patient staffed with attending Dr. Parra. Thank you for the  interesting consult. Please contact via Epic chat/Haiku with any questions/concerns.     Juaquin Frazier MD  Internal Medicine, PGY- 2  10/10/24 at 9:50 AM

## 2025-01-20 NOTE — BEHAVIORAL HEALTH ASSESSMENT NOTE - NSBHCONSULTMEDNEW_PSY_A_CORE
Called patient and left a message for her to call the office back to schedule a follow appointment with Dr. Lieberman at [facility].   yes